# Patient Record
Sex: MALE | Race: WHITE | NOT HISPANIC OR LATINO
[De-identification: names, ages, dates, MRNs, and addresses within clinical notes are randomized per-mention and may not be internally consistent; named-entity substitution may affect disease eponyms.]

---

## 2017-01-03 ENCOUNTER — RECORD ABSTRACTING (OUTPATIENT)
Age: 52
End: 2017-01-03

## 2017-01-11 ENCOUNTER — RESULT REVIEW (OUTPATIENT)
Age: 52
End: 2017-01-11

## 2017-01-11 ENCOUNTER — OTHER (OUTPATIENT)
Age: 52
End: 2017-01-11

## 2017-01-11 ENCOUNTER — APPOINTMENT (OUTPATIENT)
Dept: INTERNAL MEDICINE | Facility: CLINIC | Age: 52
End: 2017-01-11

## 2017-01-11 VITALS
SYSTOLIC BLOOD PRESSURE: 116 MMHG | WEIGHT: 171 LBS | HEART RATE: 93 BPM | RESPIRATION RATE: 17 BRPM | DIASTOLIC BLOOD PRESSURE: 79 MMHG | HEIGHT: 69 IN | BODY MASS INDEX: 25.33 KG/M2 | TEMPERATURE: 96.91 F

## 2017-01-12 LAB
ALBUMIN SERPL-MCNC: 4.8 G/DL
ALBUMIN/GLOB SERPL: 2.29
ALP SERPL-CCNC: 56 IU/L
ALT SERPL-CCNC: 33 IU/L
ANION GAP SERPL CALC-SCNC: 8 MEQ/L
AST SERPL-CCNC: 33 IU/L
BASOPHILS # BLD: 0.01 TH/MM3
BASOPHILS NFR BLD: 0.2 %
BILIRUB SERPL-MCNC: 1 MG/DL
BUN SERPL-MCNC: 11 MG/DL
BUN/CREAT SERPL: 10.8 %
CALCIUM SERPL-MCNC: 9.6 MG/DL
CHLORIDE SERPL-SCNC: 103 MEQ/L
CHOLEST SERPL-MCNC: 262 MG/DL
CO2 SERPL-SCNC: 30 MEQ/L
CREAT SERPL-MCNC: 1.02 MG/DL
EOSINOPHIL # BLD: 0.04 TH/MM3
EOSINOPHIL NFR BLD: 0.9 %
ERYTHROCYTE [DISTWIDTH] IN BLOOD BY AUTOMATED COUNT: 13 %
ESTIMATED AVERGAGE GLUCOSE (NORTH): 100 MG/DL
GFR SERPL CREATININE-BSD FRML MDRD: 77
GLUCOSE SERPL-MCNC: 94 MG/DL
GRANULOCYTES # BLD: 2.08 TH/MM3
GRANULOCYTES NFR BLD: 45.9 %
HBA1C MFR BLD: 5.1 %
HCT VFR BLD AUTO: 45.7 %
HDLC SERPL-MCNC: 103 MG/DL
HDLC SERPL: 2.54
HGB BLD-MCNC: 15.8 G/DL
IMM GRANULOCYTES # BLD: 0.01 TH/MM3
IMM GRANULOCYTES NFR BLD: 0.2 %
LDLC SERPL DIRECT ASSAY-MCNC: 138 MG/DL
LYMPHOCYTES # BLD: 1.86 TH/MM3
LYMPHOCYTES NFR BLD: 41.1 %
MCH RBC QN AUTO: 32.9 PG
MCHC RBC AUTO-ENTMCNC: 34.6 G/DL
MCV RBC AUTO: 95.2 FL
MONOCYTES # BLD: 0.53 TH/MM3
MONOCYTES NFR BLD: 11.7 %
PLATELET # BLD: 164 TH/MM3
PMV BLD AUTO: 11 FL
POTASSIUM SERPL-SCNC: 4.2 MMOL/L
PROT SERPL-MCNC: 6.9 G/DL
RBC # BLD AUTO: 4.8 MIL/MM3
SODIUM SERPL-SCNC: 141 MEQ/L
TRIGL SERPL-MCNC: 141 MG/DL
TSH SERPL DL<=0.005 MIU/L-ACNC: 4.52 UIU/ML
URATE SERPL-MCNC: 4.9 MG/DL
VLDLC SERPL-MCNC: 28 MG/DL
WBC # BLD: 4.53 TH/MM3

## 2017-01-13 LAB — HIV1+2 AB SPEC QL IA.RAPID: NONREACTIVE

## 2017-01-17 LAB
GAMMA INTERFERON BACKGROUND BLD IA-ACNC: 0.02 IU/ML
M TB IFN-G BLD-IMP: NEGATIVE
M TB IFN-G CD4+ T-CELLS BLD-ACNC: 0 IU/ML
MITOGEN IGNF BLD-ACNC: 4.02 IU/ML

## 2017-01-23 ENCOUNTER — APPOINTMENT (OUTPATIENT)
Dept: HEMATOLOGY ONCOLOGY | Facility: CLINIC | Age: 52
End: 2017-01-23

## 2017-01-23 VITALS
BODY MASS INDEX: 25.48 KG/M2 | HEIGHT: 69 IN | DIASTOLIC BLOOD PRESSURE: 85 MMHG | WEIGHT: 172 LBS | TEMPERATURE: 205.52 F | HEART RATE: 94 BPM | SYSTOLIC BLOOD PRESSURE: 125 MMHG

## 2017-01-24 LAB
FERRITIN SERPL-MCNC: 116 NG/ML
IRON SERPL-MCNC: 205 UG/DL
TIBC SERPL-MCNC: 369 UG/DL

## 2017-01-27 ENCOUNTER — APPOINTMENT (OUTPATIENT)
Dept: INFUSION THERAPY | Facility: CLINIC | Age: 52
End: 2017-01-27

## 2017-01-30 LAB
BASOPHILS # BLD: 0.03 TH/MM3
BASOPHILS NFR BLD: 0.5 %
EOSINOPHIL # BLD: 0.06 TH/MM3
EOSINOPHIL NFR BLD: 1 %
ERYTHROCYTE [DISTWIDTH] IN BLOOD BY AUTOMATED COUNT: 12.5 %
GRANULOCYTES # BLD: 2.52 TH/MM3
GRANULOCYTES NFR BLD: 42.7 %
HCT VFR BLD AUTO: 46.6 %
HGB BLD-MCNC: 17 G/DL
IMM GRANULOCYTES # BLD: 0.08 TH/MM3
IMM GRANULOCYTES NFR BLD: 1.4 %
LYMPHOCYTES # BLD: 2.52 TH/MM3
LYMPHOCYTES NFR BLD: 42.7 %
MCH RBC QN AUTO: 32.9 PG
MCHC RBC AUTO-ENTMCNC: 36.5 G/DL
MCV RBC AUTO: 90.1 FL
MONOCYTES # BLD: 0.69 TH/MM3
MONOCYTES NFR BLD: 11.7 %
PLATELET # BLD: 116 TH/MM3
PMV BLD AUTO: 10.7 FL
RBC # BLD AUTO: 5.17 MIL/MM3
WBC # BLD: 5.9 TH/MM3

## 2017-02-24 ENCOUNTER — OUTPATIENT (OUTPATIENT)
Dept: OUTPATIENT SERVICES | Facility: HOSPITAL | Age: 52
LOS: 1 days | Discharge: HOME | End: 2017-02-24

## 2017-02-24 ENCOUNTER — APPOINTMENT (OUTPATIENT)
Dept: INFUSION THERAPY | Facility: CLINIC | Age: 52
End: 2017-02-24

## 2017-02-27 LAB
BASOPHILS # BLD: 0.03 TH/MM3
BASOPHILS NFR BLD: 0.5 %
EOSINOPHIL # BLD: 0.1 TH/MM3
EOSINOPHIL NFR BLD: 1.7 %
ERYTHROCYTE [DISTWIDTH] IN BLOOD BY AUTOMATED COUNT: 12.4 %
FERRITIN SERPL-MCNC: 74 NG/ML
GRANULOCYTES # BLD: 2.65 TH/MM3
GRANULOCYTES NFR BLD: 46 %
HCT VFR BLD AUTO: 43.4 %
HGB BLD-MCNC: 15.3 G/DL
IMM GRANULOCYTES # BLD: 0.07 TH/MM3
IMM GRANULOCYTES NFR BLD: 1.2 %
LYMPHOCYTES # BLD: 2.25 TH/MM3
LYMPHOCYTES NFR BLD: 39.1 %
MCH RBC QN AUTO: 33.2 PG
MCHC RBC AUTO-ENTMCNC: 35.3 G/DL
MCV RBC AUTO: 94.1 FL
MONOCYTES # BLD: 0.66 TH/MM3
MONOCYTES NFR BLD: 11.5 %
PLATELET # BLD: 160 TH/MM3
PMV BLD AUTO: 9.9 FL
RBC # BLD AUTO: 4.61 MIL/MM3
WBC # BLD: 5.76 TH/MM3

## 2017-04-19 ENCOUNTER — APPOINTMENT (OUTPATIENT)
Dept: HEMATOLOGY ONCOLOGY | Facility: CLINIC | Age: 52
End: 2017-04-19

## 2017-04-19 VITALS
HEIGHT: 69 IN | WEIGHT: 170 LBS | HEART RATE: 90 BPM | SYSTOLIC BLOOD PRESSURE: 124 MMHG | DIASTOLIC BLOOD PRESSURE: 85 MMHG | BODY MASS INDEX: 25.18 KG/M2 | TEMPERATURE: 205.52 F

## 2017-04-20 LAB
ALBUMIN SERPL-MCNC: 4.4 G/DL
ALBUMIN/GLOB SERPL: 1.91
ALP SERPL-CCNC: 56 IU/L
ALT SERPL-CCNC: 26 IU/L
ANION GAP SERPL CALC-SCNC: 10 MEQ/L
AST SERPL-CCNC: 29 IU/L
BASOPHILS # BLD: 0.03 TH/MM3
BASOPHILS NFR BLD: 0.6 %
BILIRUB SERPL-MCNC: 1 MG/DL
BUN SERPL-MCNC: 7 MG/DL
BUN/CREAT SERPL: 8 %
CALCIUM SERPL-MCNC: 10 MG/DL
CHLORIDE SERPL-SCNC: 103 MEQ/L
CO2 SERPL-SCNC: 30 MEQ/L
CREAT SERPL-MCNC: 0.87 MG/DL
EOSINOPHIL # BLD: 0.07 TH/MM3
EOSINOPHIL NFR BLD: 1.4 %
ERYTHROCYTE [DISTWIDTH] IN BLOOD BY AUTOMATED COUNT: 12.5 %
GFR SERPL CREATININE-BSD FRML MDRD: 93
GLUCOSE SERPL-MCNC: 66 MG/DL
GRANULOCYTES # BLD: 2.47 TH/MM3
GRANULOCYTES NFR BLD: 47.8 %
HCT VFR BLD AUTO: 43.8 %
HGB BLD-MCNC: 15 G/DL
IMM GRANULOCYTES # BLD: 0.01 TH/MM3
IMM GRANULOCYTES NFR BLD: 0.2 %
LYMPHOCYTES # BLD: 1.92 TH/MM3
LYMPHOCYTES NFR BLD: 37.2 %
MCH RBC QN AUTO: 32.5 PG
MCHC RBC AUTO-ENTMCNC: 34.2 G/DL
MCV RBC AUTO: 94.8 FL
MONOCYTES # BLD: 0.66 TH/MM3
MONOCYTES NFR BLD: 12.8 %
PERCENT SATURATION (NORTH): 28.7 %
PLATELET # BLD: 156 TH/MM3
PMV BLD AUTO: 10.2 FL
POTASSIUM SERPL-SCNC: 4.8 MMOL/L
PROT SERPL-MCNC: 6.7 G/DL
RBC # BLD AUTO: 4.62 MIL/MM3
SODIUM SERPL-SCNC: 143 MEQ/L
TIBC SERPL-MCNC: 425 UG/DL
WBC # BLD: 5.16 TH/MM3

## 2017-04-21 LAB
AFP-TM SERPL-MCNC: 6.2 NG/ML
FERRITIN SERPL-MCNC: 61 NG/ML

## 2017-05-03 ENCOUNTER — APPOINTMENT (OUTPATIENT)
Dept: INTERNAL MEDICINE | Facility: CLINIC | Age: 52
End: 2017-05-03

## 2017-05-03 VITALS
HEART RATE: 91 BPM | BODY MASS INDEX: 25.33 KG/M2 | DIASTOLIC BLOOD PRESSURE: 81 MMHG | WEIGHT: 171 LBS | SYSTOLIC BLOOD PRESSURE: 120 MMHG | HEIGHT: 69 IN

## 2017-05-04 ENCOUNTER — OUTPATIENT (OUTPATIENT)
Dept: OUTPATIENT SERVICES | Facility: HOSPITAL | Age: 52
LOS: 1 days | Discharge: HOME | End: 2017-05-04

## 2017-06-19 ENCOUNTER — OUTPATIENT (OUTPATIENT)
Dept: OUTPATIENT SERVICES | Facility: HOSPITAL | Age: 52
LOS: 1 days | Discharge: HOME | End: 2017-06-19

## 2017-06-28 DIAGNOSIS — Z79.899 OTHER LONG TERM (CURRENT) DRUG THERAPY: ICD-10-CM

## 2017-06-28 DIAGNOSIS — R10.9 UNSPECIFIED ABDOMINAL PAIN: ICD-10-CM

## 2017-07-17 ENCOUNTER — OUTPATIENT (OUTPATIENT)
Dept: OUTPATIENT SERVICES | Facility: HOSPITAL | Age: 52
LOS: 1 days | Discharge: HOME | End: 2017-07-17

## 2017-07-17 DIAGNOSIS — Z00.00 ENCOUNTER FOR GENERAL ADULT MEDICAL EXAMINATION WITHOUT ABNORMAL FINDINGS: ICD-10-CM

## 2017-07-18 ENCOUNTER — RESULT REVIEW (OUTPATIENT)
Age: 52
End: 2017-07-18

## 2017-07-18 ENCOUNTER — APPOINTMENT (OUTPATIENT)
Dept: HEMATOLOGY ONCOLOGY | Facility: CLINIC | Age: 52
End: 2017-07-18

## 2017-07-18 VITALS
HEIGHT: 69 IN | SYSTOLIC BLOOD PRESSURE: 121 MMHG | BODY MASS INDEX: 24.73 KG/M2 | DIASTOLIC BLOOD PRESSURE: 75 MMHG | TEMPERATURE: 209.48 F | WEIGHT: 167 LBS | HEART RATE: 79 BPM

## 2017-07-19 LAB
BASOPHILS # BLD: 0.02 TH/MM3
BASOPHILS NFR BLD: 0.4 %
EOSINOPHIL # BLD: 0.05 TH/MM3
EOSINOPHIL NFR BLD: 1 %
ERYTHROCYTE [DISTWIDTH] IN BLOOD BY AUTOMATED COUNT: 12.8 %
GRANULOCYTES # BLD: 2.18 TH/MM3
GRANULOCYTES NFR BLD: 44.9 %
HCT VFR BLD AUTO: 43.2 %
HGB BLD-MCNC: 14.8 G/DL
IMM GRANULOCYTES # BLD: 0.02 TH/MM3
IMM GRANULOCYTES NFR BLD: 0.4 %
IRON SERPL-MCNC: 245 UG/DL
LYMPHOCYTES # BLD: 2.02 TH/MM3
LYMPHOCYTES NFR BLD: 41.6 %
MCH RBC QN AUTO: 32.4 PG
MCHC RBC AUTO-ENTMCNC: 34.3 G/DL
MCV RBC AUTO: 94.5 FL
MONOCYTES # BLD: 0.57 TH/MM3
MONOCYTES NFR BLD: 11.7 %
PERCENT SATURATION (NORTH): 70.6 %
PLATELET # BLD: 148 TH/MM3
PMV BLD AUTO: 9.6 FL
RBC # BLD AUTO: 4.57 MIL/MM3
TIBC SERPL-MCNC: 347 UG/DL
WBC # BLD: 4.86 TH/MM3

## 2017-07-25 LAB — FERRITIN SERPL-MCNC: 105 NG/ML

## 2017-08-01 ENCOUNTER — EMERGENCY (EMERGENCY)
Facility: HOSPITAL | Age: 52
LOS: 0 days | Discharge: HOME | End: 2017-08-01
Admitting: INTERNAL MEDICINE
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
## 2017-08-01 DIAGNOSIS — Z79.899 OTHER LONG TERM (CURRENT) DRUG THERAPY: ICD-10-CM
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
## 2017-08-01 DIAGNOSIS — Z48.02 ENCOUNTER FOR REMOVAL OF SUTURES: ICD-10-CM

## 2017-08-01 DIAGNOSIS — S01.00XD UNSPECIFIED OPEN WOUND OF SCALP, SUBSEQUENT ENCOUNTER: ICD-10-CM

## 2017-08-02 ENCOUNTER — OUTPATIENT (OUTPATIENT)
Dept: OUTPATIENT SERVICES | Facility: HOSPITAL | Age: 52
LOS: 1 days | Discharge: HOME | End: 2017-08-02

## 2017-08-02 ENCOUNTER — APPOINTMENT (OUTPATIENT)
Dept: INTERNAL MEDICINE | Facility: CLINIC | Age: 52
End: 2017-08-02

## 2017-08-02 VITALS
DIASTOLIC BLOOD PRESSURE: 78 MMHG | WEIGHT: 168 LBS | HEART RATE: 84 BPM | SYSTOLIC BLOOD PRESSURE: 121 MMHG | BODY MASS INDEX: 24.88 KG/M2 | HEIGHT: 69 IN

## 2017-08-02 DIAGNOSIS — E78.00 PURE HYPERCHOLESTEROLEMIA, UNSPECIFIED: ICD-10-CM

## 2017-08-02 DIAGNOSIS — F20.9 SCHIZOPHRENIA, UNSPECIFIED: ICD-10-CM

## 2017-08-02 DIAGNOSIS — G40.909 EPILEPSY, UNSPECIFIED, NOT INTRACTABLE, WITHOUT STATUS EPILEPTICUS: ICD-10-CM

## 2017-08-02 DIAGNOSIS — I10 ESSENTIAL (PRIMARY) HYPERTENSION: ICD-10-CM

## 2017-08-10 ENCOUNTER — OUTPATIENT (OUTPATIENT)
Dept: OUTPATIENT SERVICES | Facility: HOSPITAL | Age: 52
LOS: 1 days | Discharge: HOME | End: 2017-08-10

## 2017-08-10 DIAGNOSIS — K92.0 HEMATEMESIS: ICD-10-CM

## 2017-08-10 DIAGNOSIS — K92.9 DISEASE OF DIGESTIVE SYSTEM, UNSPECIFIED: ICD-10-CM

## 2017-08-11 ENCOUNTER — OUTPATIENT (OUTPATIENT)
Dept: OUTPATIENT SERVICES | Facility: HOSPITAL | Age: 52
LOS: 1 days | Discharge: HOME | End: 2017-08-11

## 2017-08-11 ENCOUNTER — APPOINTMENT (OUTPATIENT)
Dept: INFUSION THERAPY | Facility: CLINIC | Age: 52
End: 2017-08-11

## 2017-08-11 VITALS
SYSTOLIC BLOOD PRESSURE: 120 MMHG | TEMPERATURE: 96.3 F | RESPIRATION RATE: 14 BRPM | HEART RATE: 80 BPM | DIASTOLIC BLOOD PRESSURE: 86 MMHG

## 2017-08-11 DIAGNOSIS — E83.119 HEMOCHROMATOSIS, UNSPECIFIED: ICD-10-CM

## 2017-08-13 LAB
BASOPHILS # BLD: 0.03 TH/MM3
BASOPHILS NFR BLD: 0.6 %
EOSINOPHIL # BLD: 0.08 TH/MM3
EOSINOPHIL NFR BLD: 1.5 %
ERYTHROCYTE [DISTWIDTH] IN BLOOD BY AUTOMATED COUNT: 12.6 %
GRANULOCYTES # BLD: 2.12 TH/MM3
GRANULOCYTES NFR BLD: 40 %
HCT VFR BLD AUTO: 45.7 %
HGB BLD-MCNC: 16.2 G/DL
IMM GRANULOCYTES # BLD: 0.14 TH/MM3
IMM GRANULOCYTES NFR BLD: 2.6 %
LYMPHOCYTES # BLD: 2.28 TH/MM3
LYMPHOCYTES NFR BLD: 43 %
MCH RBC QN AUTO: 32.9 PG
MCHC RBC AUTO-ENTMCNC: 35.4 G/DL
MCV RBC AUTO: 92.9 FL
MONOCYTES # BLD: 0.65 TH/MM3
MONOCYTES NFR BLD: 12.3 %
PLATELET # BLD: 152 TH/MM3
PMV BLD AUTO: 10.4 FL
RBC # BLD AUTO: 4.92 MIL/MM3
WBC # BLD: 5.3 TH/MM3

## 2017-08-14 ENCOUNTER — OUTPATIENT (OUTPATIENT)
Dept: OUTPATIENT SERVICES | Facility: HOSPITAL | Age: 52
LOS: 1 days | Discharge: HOME | End: 2017-08-14

## 2017-08-14 DIAGNOSIS — F20.9 SCHIZOPHRENIA, UNSPECIFIED: ICD-10-CM

## 2017-08-18 ENCOUNTER — OUTPATIENT (OUTPATIENT)
Dept: OUTPATIENT SERVICES | Facility: HOSPITAL | Age: 52
LOS: 1 days | Discharge: HOME | End: 2017-08-18

## 2017-08-18 DIAGNOSIS — Z79.899 OTHER LONG TERM (CURRENT) DRUG THERAPY: ICD-10-CM

## 2017-08-18 DIAGNOSIS — F20.9 SCHIZOPHRENIA, UNSPECIFIED: ICD-10-CM

## 2017-08-18 DIAGNOSIS — E83.119 HEMOCHROMATOSIS, UNSPECIFIED: ICD-10-CM

## 2017-08-25 ENCOUNTER — OUTPATIENT (OUTPATIENT)
Dept: OUTPATIENT SERVICES | Facility: HOSPITAL | Age: 52
LOS: 1 days | Discharge: HOME | End: 2017-08-25

## 2017-08-30 DIAGNOSIS — R15.0 INCOMPLETE DEFECATION: ICD-10-CM

## 2017-08-30 DIAGNOSIS — I10 ESSENTIAL (PRIMARY) HYPERTENSION: ICD-10-CM

## 2017-08-30 DIAGNOSIS — Z53.9 PROCEDURE AND TREATMENT NOT CARRIED OUT, UNSPECIFIED REASON: ICD-10-CM

## 2017-08-30 DIAGNOSIS — Z12.11 ENCOUNTER FOR SCREENING FOR MALIGNANT NEOPLASM OF COLON: ICD-10-CM

## 2017-09-08 ENCOUNTER — APPOINTMENT (OUTPATIENT)
Dept: HEMATOLOGY ONCOLOGY | Facility: CLINIC | Age: 52
End: 2017-09-08

## 2017-09-11 ENCOUNTER — OUTPATIENT (OUTPATIENT)
Dept: OUTPATIENT SERVICES | Facility: HOSPITAL | Age: 52
LOS: 1 days | Discharge: HOME | End: 2017-09-11

## 2017-09-11 DIAGNOSIS — F20.9 SCHIZOPHRENIA, UNSPECIFIED: ICD-10-CM

## 2017-09-11 DIAGNOSIS — Z79.899 OTHER LONG TERM (CURRENT) DRUG THERAPY: ICD-10-CM

## 2017-09-14 LAB — FERRITIN SERPL-MCNC: 110 NG/ML

## 2017-09-26 ENCOUNTER — APPOINTMENT (OUTPATIENT)
Dept: INFUSION THERAPY | Facility: CLINIC | Age: 52
End: 2017-09-26

## 2017-09-26 ENCOUNTER — RX RENEWAL (OUTPATIENT)
Age: 52
End: 2017-09-26

## 2017-09-28 LAB
BASOPHILS # BLD: 0.02 TH/MM3
BASOPHILS NFR BLD: 0.4 %
EOSINOPHIL # BLD: 0.06 TH/MM3
EOSINOPHIL NFR BLD: 1.2 %
ERYTHROCYTE [DISTWIDTH] IN BLOOD BY AUTOMATED COUNT: 12.4 %
GRANULOCYTES # BLD: 2.51 TH/MM3
GRANULOCYTES NFR BLD: 50 %
HCT VFR BLD AUTO: 44.8 %
HGB BLD-MCNC: 15.6 G/DL
IMM GRANULOCYTES # BLD: 0.03 TH/MM3
IMM GRANULOCYTES NFR BLD: 0.6 %
LYMPHOCYTES # BLD: 1.96 TH/MM3
LYMPHOCYTES NFR BLD: 39 %
MCH RBC QN AUTO: 32.8 PG
MCHC RBC AUTO-ENTMCNC: 34.8 G/DL
MCV RBC AUTO: 94.3 FL
MONOCYTES # BLD: 0.44 TH/MM3
MONOCYTES NFR BLD: 8.8 %
PLATELET # BLD: 159 TH/MM3
PMV BLD AUTO: 10.3 FL
RBC # BLD AUTO: 4.75 MIL/MM3
WBC # BLD: 5.02 TH/MM3

## 2017-10-03 ENCOUNTER — APPOINTMENT (OUTPATIENT)
Dept: INFUSION THERAPY | Facility: CLINIC | Age: 52
End: 2017-10-03

## 2017-10-05 LAB
BASOPHILS # BLD: 0.04 TH/MM3
BASOPHILS NFR BLD: 0.7 %
EOSINOPHIL # BLD: 0.04 TH/MM3
EOSINOPHIL NFR BLD: 0.7 %
ERYTHROCYTE [DISTWIDTH] IN BLOOD BY AUTOMATED COUNT: 12.9 %
GRANULOCYTES # BLD: 2.64 TH/MM3
GRANULOCYTES NFR BLD: 47.9 %
HCT VFR BLD AUTO: 44.2 %
HGB BLD-MCNC: 15.1 G/DL
IMM GRANULOCYTES # BLD: 0.11 TH/MM3
IMM GRANULOCYTES NFR BLD: 2 %
LYMPHOCYTES # BLD: 2.17 TH/MM3
LYMPHOCYTES NFR BLD: 39.4 %
MCH RBC QN AUTO: 32.3 PG
MCHC RBC AUTO-ENTMCNC: 34.2 G/DL
MCV RBC AUTO: 94.4 FL
MONOCYTES # BLD: 0.51 TH/MM3
MONOCYTES NFR BLD: 9.3 %
PLATELET # BLD: 153 TH/MM3
PMV BLD AUTO: 10.6 FL
RBC # BLD AUTO: 4.68 MIL/MM3
WBC # BLD: 5.51 TH/MM3

## 2017-10-09 ENCOUNTER — OUTPATIENT (OUTPATIENT)
Dept: OUTPATIENT SERVICES | Facility: HOSPITAL | Age: 52
LOS: 1 days | Discharge: HOME | End: 2017-10-09

## 2017-10-09 DIAGNOSIS — Z79.899 OTHER LONG TERM (CURRENT) DRUG THERAPY: ICD-10-CM

## 2017-10-09 DIAGNOSIS — F20.9 SCHIZOPHRENIA, UNSPECIFIED: ICD-10-CM

## 2017-10-19 ENCOUNTER — APPOINTMENT (OUTPATIENT)
Dept: HEMATOLOGY ONCOLOGY | Facility: CLINIC | Age: 52
End: 2017-10-19

## 2017-10-27 ENCOUNTER — OUTPATIENT (OUTPATIENT)
Dept: OUTPATIENT SERVICES | Facility: HOSPITAL | Age: 52
LOS: 1 days | Discharge: HOME | End: 2017-10-27

## 2017-10-27 DIAGNOSIS — K92.0 HEMATEMESIS: ICD-10-CM

## 2017-10-27 DIAGNOSIS — K92.9 DISEASE OF DIGESTIVE SYSTEM, UNSPECIFIED: ICD-10-CM

## 2017-10-31 ENCOUNTER — OUTPATIENT (OUTPATIENT)
Dept: OUTPATIENT SERVICES | Facility: HOSPITAL | Age: 52
LOS: 1 days | Discharge: HOME | End: 2017-10-31

## 2017-10-31 ENCOUNTER — APPOINTMENT (OUTPATIENT)
Dept: INFUSION THERAPY | Facility: CLINIC | Age: 52
End: 2017-10-31

## 2017-10-31 ENCOUNTER — APPOINTMENT (OUTPATIENT)
Dept: HEMATOLOGY ONCOLOGY | Facility: CLINIC | Age: 52
End: 2017-10-31

## 2017-10-31 VITALS
WEIGHT: 169 LBS | DIASTOLIC BLOOD PRESSURE: 88 MMHG | HEIGHT: 69 IN | TEMPERATURE: 97.2 F | HEART RATE: 93 BPM | RESPIRATION RATE: 14 BRPM | BODY MASS INDEX: 25.03 KG/M2 | SYSTOLIC BLOOD PRESSURE: 123 MMHG

## 2017-10-31 DIAGNOSIS — E83.119 HEMOCHROMATOSIS, UNSPECIFIED: ICD-10-CM

## 2017-11-01 ENCOUNTER — OUTPATIENT (OUTPATIENT)
Dept: OUTPATIENT SERVICES | Facility: HOSPITAL | Age: 52
LOS: 1 days | Discharge: HOME | End: 2017-11-01

## 2017-11-01 DIAGNOSIS — E83.119 HEMOCHROMATOSIS, UNSPECIFIED: ICD-10-CM

## 2017-11-01 DIAGNOSIS — R52 PAIN, UNSPECIFIED: ICD-10-CM

## 2017-11-02 LAB
BASOPHILS # BLD: 0.04 TH/MM3
BASOPHILS NFR BLD: 0.7 %
EOSINOPHIL # BLD: 0.06 TH/MM3
EOSINOPHIL NFR BLD: 1.1 %
ERYTHROCYTE [DISTWIDTH] IN BLOOD BY AUTOMATED COUNT: 12.5 %
FERRITIN SERPL-MCNC: 59 NG/ML
GRANULOCYTES # BLD: 2.42 TH/MM3
GRANULOCYTES NFR BLD: 44.6 %
HCT VFR BLD AUTO: 43.2 %
HGB BLD-MCNC: 14.8 G/DL
IMM GRANULOCYTES # BLD: 0.04 TH/MM3
IMM GRANULOCYTES NFR BLD: 0.7 %
LYMPHOCYTES # BLD: 2.22 TH/MM3
LYMPHOCYTES NFR BLD: 40.9 %
MCH RBC QN AUTO: 32.4 PG
MCHC RBC AUTO-ENTMCNC: 34.3 G/DL
MCV RBC AUTO: 94.5 FL
MONOCYTES # BLD: 0.65 TH/MM3
MONOCYTES NFR BLD: 12 %
PLATELET # BLD: 182 TH/MM3
PMV BLD AUTO: 10.3 FL
RBC # BLD AUTO: 4.57 MIL/MM3
WBC # BLD: 5.43 TH/MM3

## 2017-11-06 ENCOUNTER — OUTPATIENT (OUTPATIENT)
Dept: OUTPATIENT SERVICES | Facility: HOSPITAL | Age: 52
LOS: 1 days | Discharge: HOME | End: 2017-11-06

## 2017-11-08 ENCOUNTER — APPOINTMENT (OUTPATIENT)
Dept: INTERNAL MEDICINE | Facility: CLINIC | Age: 52
End: 2017-11-08

## 2017-11-08 ENCOUNTER — OUTPATIENT (OUTPATIENT)
Dept: OUTPATIENT SERVICES | Facility: HOSPITAL | Age: 52
LOS: 1 days | Discharge: HOME | End: 2017-11-08

## 2017-11-08 VITALS
WEIGHT: 169 LBS | SYSTOLIC BLOOD PRESSURE: 127 MMHG | HEIGHT: 69 IN | DIASTOLIC BLOOD PRESSURE: 83 MMHG | HEART RATE: 92 BPM | BODY MASS INDEX: 25.03 KG/M2

## 2017-11-08 DIAGNOSIS — E83.119 HEMOCHROMATOSIS, UNSPECIFIED: ICD-10-CM

## 2017-11-08 DIAGNOSIS — I10 ESSENTIAL (PRIMARY) HYPERTENSION: ICD-10-CM

## 2017-11-08 DIAGNOSIS — F20.9 SCHIZOPHRENIA, UNSPECIFIED: ICD-10-CM

## 2017-11-14 ENCOUNTER — OUTPATIENT (OUTPATIENT)
Dept: OUTPATIENT SERVICES | Facility: HOSPITAL | Age: 52
LOS: 1 days | Discharge: HOME | End: 2017-11-14

## 2017-11-20 ENCOUNTER — OUTPATIENT (OUTPATIENT)
Dept: OUTPATIENT SERVICES | Facility: HOSPITAL | Age: 52
LOS: 1 days | Discharge: HOME | End: 2017-11-20

## 2017-11-27 DIAGNOSIS — D12.2 BENIGN NEOPLASM OF ASCENDING COLON: ICD-10-CM

## 2017-11-27 DIAGNOSIS — K62.1 RECTAL POLYP: ICD-10-CM

## 2017-11-27 DIAGNOSIS — K64.1 SECOND DEGREE HEMORRHOIDS: ICD-10-CM

## 2017-11-27 DIAGNOSIS — R15.0 INCOMPLETE DEFECATION: ICD-10-CM

## 2017-11-27 DIAGNOSIS — I10 ESSENTIAL (PRIMARY) HYPERTENSION: ICD-10-CM

## 2017-11-27 DIAGNOSIS — D12.6 BENIGN NEOPLASM OF COLON, UNSPECIFIED: ICD-10-CM

## 2017-12-04 ENCOUNTER — OUTPATIENT (OUTPATIENT)
Dept: OUTPATIENT SERVICES | Facility: HOSPITAL | Age: 52
LOS: 1 days | Discharge: HOME | End: 2017-12-04

## 2017-12-04 DIAGNOSIS — F20.9 SCHIZOPHRENIA, UNSPECIFIED: ICD-10-CM

## 2017-12-12 ENCOUNTER — APPOINTMENT (OUTPATIENT)
Dept: HEMATOLOGY ONCOLOGY | Facility: CLINIC | Age: 52
End: 2017-12-12

## 2017-12-14 LAB
BASOPHILS # BLD: 0.01 TH/MM3
BASOPHILS NFR BLD: 0.2 %
EOSINOPHIL # BLD: 0.06 TH/MM3
EOSINOPHIL NFR BLD: 1.4 %
ERYTHROCYTE [DISTWIDTH] IN BLOOD BY AUTOMATED COUNT: 12.8 %
FERRITIN SERPL-MCNC: 116 NG/ML
GRANULOCYTES # BLD: 1.72 TH/MM3
GRANULOCYTES NFR BLD: 39.5 %
HCT VFR BLD AUTO: 40.5 %
HGB BLD-MCNC: 13.5 G/DL
IMM GRANULOCYTES # BLD: 0.01 TH/MM3
IMM GRANULOCYTES NFR BLD: 0.2 %
LYMPHOCYTES # BLD: 1.96 TH/MM3
LYMPHOCYTES NFR BLD: 45.1 %
MCH RBC QN AUTO: 31.8 PG
MCHC RBC AUTO-ENTMCNC: 33.3 G/DL
MCV RBC AUTO: 95.5 FL
MONOCYTES # BLD: 0.59 TH/MM3
MONOCYTES NFR BLD: 13.6 %
PLATELET # BLD: 193 TH/MM3
PMV BLD AUTO: 9.7 FL
RBC # BLD AUTO: 4.24 MIL/MM3
WBC # BLD: 4.35 TH/MM3

## 2017-12-30 ENCOUNTER — OUTPATIENT (OUTPATIENT)
Dept: OUTPATIENT SERVICES | Facility: HOSPITAL | Age: 52
LOS: 1 days | Discharge: HOME | End: 2017-12-30

## 2017-12-30 DIAGNOSIS — Z79.899 OTHER LONG TERM (CURRENT) DRUG THERAPY: ICD-10-CM

## 2018-01-08 ENCOUNTER — APPOINTMENT (OUTPATIENT)
Dept: INFUSION THERAPY | Facility: CLINIC | Age: 53
End: 2018-01-08

## 2018-01-10 LAB
BASOPHILS # BLD: 0.03 TH/MM3
BASOPHILS NFR BLD: 0.6 %
EOSINOPHIL # BLD: 0.05 TH/MM3
EOSINOPHIL NFR BLD: 1 %
ERYTHROCYTE [DISTWIDTH] IN BLOOD BY AUTOMATED COUNT: 13.4 %
FERRITIN SERPL-MCNC: 61 NG/ML
GRANULOCYTES # BLD: 2.23 TH/MM3
GRANULOCYTES NFR BLD: 46.4 %
HCT VFR BLD AUTO: 43.4 %
HGB BLD-MCNC: 14.4 G/DL
IMM GRANULOCYTES # BLD: 0.07 TH/MM3
IMM GRANULOCYTES NFR BLD: 1.5 %
LYMPHOCYTES # BLD: 1.87 TH/MM3
LYMPHOCYTES NFR BLD: 38.9 %
MCH RBC QN AUTO: 31.7 PG
MCHC RBC AUTO-ENTMCNC: 33.2 G/DL
MCV RBC AUTO: 95.6 FL
MONOCYTES # BLD: 0.56 TH/MM3
MONOCYTES NFR BLD: 11.6 %
PLATELET # BLD: 163 TH/MM3
PMV BLD AUTO: 10.4 FL
RBC # BLD AUTO: 4.54 MIL/MM3
WBC # BLD: 4.81 TH/MM3

## 2018-01-17 ENCOUNTER — OUTPATIENT (OUTPATIENT)
Dept: OUTPATIENT SERVICES | Facility: HOSPITAL | Age: 53
LOS: 1 days | Discharge: HOME | End: 2018-01-17

## 2018-01-17 ENCOUNTER — APPOINTMENT (OUTPATIENT)
Dept: INTERNAL MEDICINE | Facility: CLINIC | Age: 53
End: 2018-01-17

## 2018-01-17 ENCOUNTER — RESULT REVIEW (OUTPATIENT)
Age: 53
End: 2018-01-17

## 2018-01-17 VITALS
DIASTOLIC BLOOD PRESSURE: 84 MMHG | WEIGHT: 170 LBS | HEART RATE: 91 BPM | HEIGHT: 69 IN | BODY MASS INDEX: 25.18 KG/M2 | SYSTOLIC BLOOD PRESSURE: 119 MMHG

## 2018-01-17 RX ORDER — ALLOPURINOL 100 MG/1
100 TABLET ORAL
Qty: 30 | Refills: 0 | Status: DISCONTINUED | COMMUNITY
Start: 2017-08-07

## 2018-01-17 RX ORDER — FACIAL-BODY WIPES
5 EACH TOPICAL
Qty: 4 | Refills: 0 | Status: DISCONTINUED | COMMUNITY
Start: 2017-10-03

## 2018-01-17 RX ORDER — POLYETHYLENE GLYCOL-3350 AND ELECTROLYTES 236; 6.74; 5.86; 2.97; 22.74 G/274.31G; G/274.31G; G/274.31G; G/274.31G; G/274.31G
236 POWDER, FOR SOLUTION ORAL
Qty: 4000 | Refills: 0 | Status: DISCONTINUED | COMMUNITY
Start: 2017-10-03

## 2018-01-17 RX ORDER — DIVALPROEX SODIUM 500 MG/1
500 TABLET, DELAYED RELEASE ORAL
Qty: 90 | Refills: 0 | Status: DISCONTINUED | COMMUNITY
Start: 2017-08-04

## 2018-01-18 LAB
ALBUMIN SERPL-MCNC: 4.8 G/DL
ALBUMIN/GLOB SERPL: 2.29
ALP SERPL-CCNC: 58 IU/L
ALT SERPL-CCNC: 35 IU/L
ANION GAP SERPL CALC-SCNC: 14 MEQ/L
APPEARANCE UR: CLEAR
AST SERPL-CCNC: 43 IU/L
BASOPHILS # BLD: 0.02 TH/MM3
BASOPHILS NFR BLD: 0.4 %
BILIRUB SERPL-MCNC: 0.9 MG/DL
BILIRUB UR QL STRIP: NEGATIVE
BUN SERPL-MCNC: 12 MG/DL
BUN/CREAT SERPL: 11.9 %
CALCIUM SERPL-MCNC: 10 MG/DL
CHLORIDE SERPL-SCNC: 97 MEQ/L
CHOLEST SERPL-MCNC: 260 MG/DL
CO2 SERPL-SCNC: 28 MEQ/L
COLOR UR: YELLOW
CREAT SERPL-MCNC: 1.01 MG/DL
DIFFERENTIAL METHOD BLD: NORMAL
EOSINOPHIL # BLD: 0.08 TH/MM3
EOSINOPHIL NFR BLD: 1.5 %
ERYTHROCYTE [DISTWIDTH] IN BLOOD BY AUTOMATED COUNT: 13.6 %
ESTIMATED AVERGAGE GLUCOSE (NORTH): 103 MG/DL
GFR SERPL CREATININE-BSD FRML MDRD: 78
GLUCOSE SERPL-MCNC: 76 MG/DL
GLUCOSE UR STRIP-MCNC: NEGATIVE MG/DL
GRANULOCYTES # BLD: 2.08 TH/MM3
GRANULOCYTES NFR BLD: 38.4 %
HBA1C MFR BLD: 5.2 %
HCT VFR BLD AUTO: 46.3 %
HDLC SERPL-MCNC: 132 MG/DL
HDLC SERPL: 1.97
HGB BLD-MCNC: 15.1 G/DL
HGB UR QL STRIP: NEGATIVE
IMM GRANULOCYTES # BLD: 0.01 TH/MM3
IMM GRANULOCYTES NFR BLD: 0.2 %
KETONES UR STRIP-MCNC: ABNORMAL MG/DL
LDLC SERPL DIRECT ASSAY-MCNC: 117 MG/DL
LYMPHOCYTES # BLD: 2.48 TH/MM3
LYMPHOCYTES NFR BLD: 45.8 %
MCH RBC QN AUTO: 31.9 PG
MCHC RBC AUTO-ENTMCNC: 32.6 G/DL
MCV RBC AUTO: 97.7 FL
MONOCYTES # BLD: 0.74 TH/MM3
MONOCYTES NFR BLD: 13.7 %
NITRITE UR QL STRIP: NEGATIVE
PH UR STRIP: 6
PLATELET # BLD: 200 TH/MM3
PMV BLD AUTO: 11.3 FL
POTASSIUM SERPL-SCNC: 4.3 MMOL/L
PROT SERPL-MCNC: 6.9 G/DL
PROT UR STRIP-MCNC: NEGATIVE MG/DL
RBC # BLD AUTO: 4.74 MIL/MM3
SODIUM SERPL-SCNC: 139 MEQ/L
SP GR UR STRIP: 1.02
TRIGL SERPL-MCNC: 145 MG/DL
TSH SERPL DL<=0.005 MIU/L-ACNC: 3.26 UIU/ML
UROBILINOGEN UR STRIP-MCNC: 1 MG/DL
VLDLC SERPL-MCNC: 29 MG/DL
WBC # BLD: 5.41 TH/MM3
WBC URNS QL MICRO: NEGATIVE

## 2018-01-19 DIAGNOSIS — E78.00 PURE HYPERCHOLESTEROLEMIA, UNSPECIFIED: ICD-10-CM

## 2018-01-19 DIAGNOSIS — E83.119 HEMOCHROMATOSIS, UNSPECIFIED: ICD-10-CM

## 2018-01-19 DIAGNOSIS — F20.9 SCHIZOPHRENIA, UNSPECIFIED: ICD-10-CM

## 2018-01-19 DIAGNOSIS — I10 ESSENTIAL (PRIMARY) HYPERTENSION: ICD-10-CM

## 2018-01-19 LAB
GAMMA INTERFERON BACKGROUND BLD IA-ACNC: 0.01 IU/ML
HIV1+2 AB SPEC QL IA.RAPID: NONREACTIVE
M TB IFN-G BLD-IMP: NEGATIVE
M TB IFN-G CD4+ T-CELLS BLD-ACNC: 0 IU/ML
MITOGEN IGNF BLD-ACNC: 2.1 IU/ML

## 2018-01-22 ENCOUNTER — OUTPATIENT (OUTPATIENT)
Dept: OUTPATIENT SERVICES | Facility: HOSPITAL | Age: 53
LOS: 1 days | Discharge: HOME | End: 2018-01-22

## 2018-01-22 DIAGNOSIS — R10.30 LOWER ABDOMINAL PAIN, UNSPECIFIED: ICD-10-CM

## 2018-01-23 ENCOUNTER — OUTPATIENT (OUTPATIENT)
Dept: OUTPATIENT SERVICES | Facility: HOSPITAL | Age: 53
LOS: 1 days | Discharge: HOME | End: 2018-01-23

## 2018-01-23 ENCOUNTER — APPOINTMENT (OUTPATIENT)
Dept: HEMATOLOGY ONCOLOGY | Facility: CLINIC | Age: 53
End: 2018-01-23

## 2018-01-23 VITALS
TEMPERATURE: 97.8 F | HEART RATE: 89 BPM | DIASTOLIC BLOOD PRESSURE: 75 MMHG | RESPIRATION RATE: 14 BRPM | HEIGHT: 69 IN | BODY MASS INDEX: 24.44 KG/M2 | WEIGHT: 165 LBS | SYSTOLIC BLOOD PRESSURE: 108 MMHG

## 2018-01-23 DIAGNOSIS — E83.119 HEMOCHROMATOSIS, UNSPECIFIED: ICD-10-CM

## 2018-01-23 NOTE — RESULTS/DATA
[FreeTextEntry1] : Patient Name: NGOC AMIN : 1965\par Patient ID: [id number]\par Account: [id number]\par Patient Location: [facility]\par \par Accession: [id number]\par Procedure: CT ABDOMEN PELVIS W IV AND PO CONTRAST [id number]\par Date of Exam: 2018 06:38 PM\par Attending Physician: SHABANA VINCENT\par Requesting Physician: SHABANA VINCENT MD\par \par \par CLINICAL STATEMENT: Abnormal gallbladder. Renal cysts.\par   \par TECHNIQUE: Contiguous axial CT images were obtained from the lower chest to the\par pubic symphysis following administration of 100cc Optiray 320 intravenous\par contrast.  Oral contrast was administered.  Reformatted images in the coronal\par and sagittal planes were acquired.\par   \par Comparison made with abdominal ultrasound 2017, MRI abdomen\par 2015, CT chest 2015\par   \par FINDINGS:\par   \par LOWER CHEST: Right lung base linear atelectasis.\par   \par HEPATOBILIARY: Normal gallbladder. Liver unremarkable. Patent portal vein. No\par biliary dilation.\par   \par SPLEEN: Splenule with nonspecific fluid surrounding (series 4, image 128).\par   \par PANCREAS: Unremarkable.\par   \par ADRENAL GLANDS: Unremarkable.\par   \par KIDNEYS: Bilateral renal cysts measuring up to 3.5 cm on left. No Subcentimeter\par bilateral renal hypodensities, too small to fully characterize. No\par hydronephrosis.\par   \par ABDOMINOPELVIC NODES: Unremarkable.\par   \par PELVIC ORGANS: Unremarkable prostate gland. Nondistended urinary bladder.\par   \par PERITONEUM/MESENTERY/BOWEL: Moderate diffuse colonic stool burden without\par evidence of mechanical bowel obstruction; correlate for constipation.\par   \par BONES/SOFT TISSUES: Unremarkable.\par   \par   \par IMPRESSION:\par 1. Normal CT evaluation of gallbladder.\par 2. Moderate diffuse colonic stool burden without evidence of mechanical bowel\par obstruction; correlate for constipation.\par \par \par Original final report dictated and signed by Dr. Mike De La Garza on 2018\par 09:23 AM\par

## 2018-01-23 NOTE — PHYSICAL EXAM
[Fully active, able to carry on all pre-disease performance without restriction] : Status 0 - Fully active, able to carry on all pre-disease performance without restriction [Normal] : affect appropriate

## 2018-01-23 NOTE — HISTORY OF PRESENT ILLNESS
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  He also had a liver biopsy, consistent with iron overload.  He has been getting intermittent phlebotomies.  He has not had phlebotomy now in several months.  His most recent ferritin was from 08/16/2016; it demonstrated a ferritin of 58.  He now presents for followup evaluation.  He states he is doing quite well.  He has no complaints.\par  [de-identified] : 1/23/17\par He presents for follow up. His ferritin in Oct was 101 and he underwent a phlebotomy of 1 unit. HIs  AFP was 7 and ultrasound of the liver was normal in November. He comes in for follow up. Hs no complaints.\par \par 4/19/17\par He presents for follow up. Last ferritin  was under 100.  He has no complaints,\par \par 7/18/17\par No events. Denies weight loss, fevers. No new meds. Last AFP an ferritin were WNL\par \par 10/31/17\par He is doing well. He had a phlebotomyin September and October. No complaints\par \par 1/23/18\par He is here for follow up for hemochromatisis. He has no complaints. He had  CT abd/Pelvis this month that was normal. His Ferretin from this month is in the 60s.

## 2018-01-23 NOTE — ASSESSMENT
[FreeTextEntry1] :  This is a 52yearold male with history of compound heterozygous mutation, HFE gene for C282Y and H63D mutation, with liver biopsy consistent with iron overload.  The patient has been getting phlebotomies as needed.\par \par PLAN:  Will  check  ferritin and CBC in 2 motns .  If the ferritin is not a goal, ferritin of 50 to 100; we will arrange more  phlebotomy. CT abd/pelvis was normal. Will check AFP in 6 months \par \par The patient also knows not to take iron supplements as well as not to drink any alcoholic beverages.\par \par \par RTC in 3-4 months \par \par

## 2018-01-29 ENCOUNTER — OUTPATIENT (OUTPATIENT)
Dept: OUTPATIENT SERVICES | Facility: HOSPITAL | Age: 53
LOS: 1 days | Discharge: HOME | End: 2018-01-29

## 2018-01-29 DIAGNOSIS — Z79.899 OTHER LONG TERM (CURRENT) DRUG THERAPY: ICD-10-CM

## 2018-01-31 ENCOUNTER — APPOINTMENT (OUTPATIENT)
Dept: INTERNAL MEDICINE | Facility: CLINIC | Age: 53
End: 2018-01-31

## 2018-01-31 ENCOUNTER — OUTPATIENT (OUTPATIENT)
Dept: OUTPATIENT SERVICES | Facility: HOSPITAL | Age: 53
LOS: 1 days | Discharge: HOME | End: 2018-01-31

## 2018-01-31 VITALS
SYSTOLIC BLOOD PRESSURE: 124 MMHG | HEART RATE: 89 BPM | HEIGHT: 69 IN | DIASTOLIC BLOOD PRESSURE: 87 MMHG | WEIGHT: 166 LBS | BODY MASS INDEX: 24.59 KG/M2

## 2018-02-01 DIAGNOSIS — E78.5 HYPERLIPIDEMIA, UNSPECIFIED: ICD-10-CM

## 2018-02-01 DIAGNOSIS — I10 ESSENTIAL (PRIMARY) HYPERTENSION: ICD-10-CM

## 2018-02-01 DIAGNOSIS — F20.9 SCHIZOPHRENIA, UNSPECIFIED: ICD-10-CM

## 2018-02-26 ENCOUNTER — OUTPATIENT (OUTPATIENT)
Dept: OUTPATIENT SERVICES | Facility: HOSPITAL | Age: 53
LOS: 1 days | Discharge: HOME | End: 2018-02-26

## 2018-02-26 DIAGNOSIS — I10 ESSENTIAL (PRIMARY) HYPERTENSION: ICD-10-CM

## 2018-03-13 ENCOUNTER — APPOINTMENT (OUTPATIENT)
Dept: HEMATOLOGY ONCOLOGY | Facility: CLINIC | Age: 53
End: 2018-03-13

## 2018-03-13 ENCOUNTER — LABORATORY RESULT (OUTPATIENT)
Age: 53
End: 2018-03-13

## 2018-03-15 LAB
FERRITIN SERPL-MCNC: 81 NG/ML
HCT VFR BLD CALC: 42.2 %
HGB BLD-MCNC: 14.3 G/DL
MCHC RBC-ENTMCNC: 31.9 PG
MCHC RBC-ENTMCNC: 33.9 G/DL
MCV RBC AUTO: 94.2 FL
PLATELET # BLD AUTO: 152 K/UL
PMV BLD: 10 FL
RBC # BLD: 4.48 M/UL
RBC # FLD: 12.8 %
WBC # FLD AUTO: 4.25 K/UL

## 2018-04-23 ENCOUNTER — OUTPATIENT (OUTPATIENT)
Dept: OUTPATIENT SERVICES | Facility: HOSPITAL | Age: 53
LOS: 1 days | Discharge: HOME | End: 2018-04-23

## 2018-04-23 DIAGNOSIS — M10.9 GOUT, UNSPECIFIED: ICD-10-CM

## 2018-04-23 DIAGNOSIS — F20.9 SCHIZOPHRENIA, UNSPECIFIED: ICD-10-CM

## 2018-04-23 DIAGNOSIS — D50.8 OTHER IRON DEFICIENCY ANEMIAS: ICD-10-CM

## 2018-04-24 ENCOUNTER — APPOINTMENT (OUTPATIENT)
Dept: HEMATOLOGY ONCOLOGY | Facility: CLINIC | Age: 53
End: 2018-04-24

## 2018-04-24 ENCOUNTER — LABORATORY RESULT (OUTPATIENT)
Age: 53
End: 2018-04-24

## 2018-04-24 VITALS
BODY MASS INDEX: 33.18 KG/M2 | WEIGHT: 169 LBS | DIASTOLIC BLOOD PRESSURE: 80 MMHG | TEMPERATURE: 96.9 F | HEART RATE: 89 BPM | HEIGHT: 60 IN | SYSTOLIC BLOOD PRESSURE: 120 MMHG | RESPIRATION RATE: 16 BRPM

## 2018-04-24 LAB
HCT VFR BLD CALC: 43.6 %
HGB BLD-MCNC: 14.9 G/DL
MCHC RBC-ENTMCNC: 32.2 PG
MCHC RBC-ENTMCNC: 34.2 G/DL
MCV RBC AUTO: 94.2 FL
PLATELET # BLD AUTO: 158 K/UL
PMV BLD: 10.2 FL
RBC # BLD: 4.63 M/UL
RBC # FLD: 12.8 %
WBC # FLD AUTO: 5.38 K/UL

## 2018-04-25 LAB
ALBUMIN SERPL ELPH-MCNC: 4.6 G/DL
ALP BLD-CCNC: 59 U/L
ALT SERPL-CCNC: 24 U/L
ANION GAP SERPL CALC-SCNC: 11 MMOL/L
AST SERPL-CCNC: 24 U/L
BILIRUB SERPL-MCNC: 0.5 MG/DL
BUN SERPL-MCNC: 11 MG/DL
CALCIUM SERPL-MCNC: 9.5 MG/DL
CHLORIDE SERPL-SCNC: 102 MMOL/L
CO2 SERPL-SCNC: 29 MMOL/L
CREAT SERPL-MCNC: 0.9 MG/DL
FERRITIN SERPL-MCNC: 88 NG/ML
GLUCOSE SERPL-MCNC: 82 MG/DL
POTASSIUM SERPL-SCNC: 4.3 MMOL/L
PROT SERPL-MCNC: 6.5 G/DL
SODIUM SERPL-SCNC: 142 MMOL/L

## 2018-05-01 LAB
AFPL3 RESULTS RECEIVED: NORMAL
ALPHA-1-FETOPROTEIN-L3: NORMAL %
ALPHA-1-FETOPROTEIN: 2.5 NG/ML

## 2018-05-14 ENCOUNTER — OUTPATIENT (OUTPATIENT)
Dept: OUTPATIENT SERVICES | Facility: HOSPITAL | Age: 53
LOS: 1 days | Discharge: HOME | End: 2018-05-14

## 2018-05-14 DIAGNOSIS — Z79.899 OTHER LONG TERM (CURRENT) DRUG THERAPY: ICD-10-CM

## 2018-05-14 DIAGNOSIS — R53.81 OTHER MALAISE: ICD-10-CM

## 2018-05-21 ENCOUNTER — OUTPATIENT (OUTPATIENT)
Dept: OUTPATIENT SERVICES | Facility: HOSPITAL | Age: 53
LOS: 1 days | Discharge: HOME | End: 2018-05-21

## 2018-05-21 DIAGNOSIS — Z79.899 OTHER LONG TERM (CURRENT) DRUG THERAPY: ICD-10-CM

## 2018-05-21 DIAGNOSIS — F20.9 SCHIZOPHRENIA, UNSPECIFIED: ICD-10-CM

## 2018-06-04 ENCOUNTER — OUTPATIENT (OUTPATIENT)
Dept: OUTPATIENT SERVICES | Facility: HOSPITAL | Age: 53
LOS: 1 days | Discharge: HOME | End: 2018-06-04

## 2018-06-04 ENCOUNTER — APPOINTMENT (OUTPATIENT)
Dept: INTERNAL MEDICINE | Facility: CLINIC | Age: 53
End: 2018-06-04

## 2018-06-04 VITALS
BODY MASS INDEX: 24.14 KG/M2 | SYSTOLIC BLOOD PRESSURE: 135 MMHG | HEART RATE: 80 BPM | TEMPERATURE: 97.5 F | HEIGHT: 69 IN | WEIGHT: 163 LBS | DIASTOLIC BLOOD PRESSURE: 90 MMHG

## 2018-06-04 NOTE — PHYSICAL EXAM
[Clear to Auscultation] : lungs were clear to auscultation bilaterally [Normal Rate] : normal rate  [Regular Rhythm] : with a regular rhythm [Normal S1, S2] : normal S1 and S2 [No Edema] : there was no peripheral edema [Supple] : supple [No CVA Tenderness] : no CVA  tenderness [No Joint Swelling] : no joint swelling [de-identified] : Flat Affect

## 2018-06-04 NOTE — HISTORY OF PRESENT ILLNESS
[FreeTextEntry1] : Refill Rx [de-identified] : 51 y/o male patient PMHx of schizophrenia,HTN,hypercholesterolemia, Hemochromatosis presents to the office for a follow-up visit. Patient follows with [facility] and Dr. Cr. Patient doesn't have any complaints \par

## 2018-06-04 NOTE — ASSESSMENT
[FreeTextEntry1] : 53 y/o male patient PMHx of schizophrenia and Hemochromatosis presents to the office for a follow-up visit and RX refills\par \par # HTN\par - c/w Ramipril, ASA\par \par # Gout\par - c/w Allopurinol \par \par # Hemochromatosis\par - Patient is following up with hematology\par \par # Schizophrenia\par - Patient is following up with psychiatry

## 2018-06-05 ENCOUNTER — APPOINTMENT (OUTPATIENT)
Dept: HEMATOLOGY ONCOLOGY | Facility: CLINIC | Age: 53
End: 2018-06-05

## 2018-06-05 ENCOUNTER — LABORATORY RESULT (OUTPATIENT)
Age: 53
End: 2018-06-05

## 2018-06-06 ENCOUNTER — OTHER (OUTPATIENT)
Age: 53
End: 2018-06-06

## 2018-06-06 DIAGNOSIS — I10 ESSENTIAL (PRIMARY) HYPERTENSION: ICD-10-CM

## 2018-06-06 DIAGNOSIS — E78.5 HYPERLIPIDEMIA, UNSPECIFIED: ICD-10-CM

## 2018-06-06 DIAGNOSIS — E83.119 HEMOCHROMATOSIS, UNSPECIFIED: ICD-10-CM

## 2018-06-06 LAB
ALBUMIN SERPL ELPH-MCNC: 4.2 G/DL
ALP BLD-CCNC: 55 U/L
ALT SERPL-CCNC: 23 U/L
ANION GAP SERPL CALC-SCNC: 15 MMOL/L
AST SERPL-CCNC: 21 U/L
BILIRUB SERPL-MCNC: 0.6 MG/DL
BUN SERPL-MCNC: 11 MG/DL
CALCIUM SERPL-MCNC: 9.3 MG/DL
CHLORIDE SERPL-SCNC: 103 MMOL/L
CO2 SERPL-SCNC: 26 MMOL/L
CREAT SERPL-MCNC: 0.8 MG/DL
GLUCOSE SERPL-MCNC: 94 MG/DL
HCT VFR BLD CALC: 43.3 %
HGB BLD-MCNC: 15.2 G/DL
MCHC RBC-ENTMCNC: 33.3 PG
MCHC RBC-ENTMCNC: 35.1 G/DL
MCV RBC AUTO: 94.7 FL
PLATELET # BLD AUTO: 139 K/UL
PMV BLD: 10.1 FL
POTASSIUM SERPL-SCNC: 4 MMOL/L
PROT SERPL-MCNC: 6.2 G/DL
RBC # BLD: 4.57 M/UL
RBC # FLD: 12.5 %
SODIUM SERPL-SCNC: 144 MMOL/L
WBC # FLD AUTO: 4.74 K/UL

## 2018-06-07 LAB — FERRITIN SERPL-MCNC: 109 NG/ML

## 2018-06-13 ENCOUNTER — OUTPATIENT (OUTPATIENT)
Dept: OUTPATIENT SERVICES | Facility: HOSPITAL | Age: 53
LOS: 1 days | Discharge: HOME | End: 2018-06-13

## 2018-06-13 DIAGNOSIS — E83.119 HEMOCHROMATOSIS, UNSPECIFIED: ICD-10-CM

## 2018-06-18 ENCOUNTER — OUTPATIENT (OUTPATIENT)
Dept: OUTPATIENT SERVICES | Facility: HOSPITAL | Age: 53
LOS: 1 days | Discharge: HOME | End: 2018-06-18

## 2018-06-18 DIAGNOSIS — F20.9 SCHIZOPHRENIA, UNSPECIFIED: ICD-10-CM

## 2018-06-18 DIAGNOSIS — Z79.899 OTHER LONG TERM (CURRENT) DRUG THERAPY: ICD-10-CM

## 2018-07-16 ENCOUNTER — OUTPATIENT (OUTPATIENT)
Dept: OUTPATIENT SERVICES | Facility: HOSPITAL | Age: 53
LOS: 1 days | Discharge: HOME | End: 2018-07-16

## 2018-07-16 DIAGNOSIS — Z79.899 OTHER LONG TERM (CURRENT) DRUG THERAPY: ICD-10-CM

## 2018-08-10 ENCOUNTER — OUTPATIENT (OUTPATIENT)
Dept: OUTPATIENT SERVICES | Facility: HOSPITAL | Age: 53
LOS: 1 days | Discharge: HOME | End: 2018-08-10

## 2018-08-10 ENCOUNTER — APPOINTMENT (OUTPATIENT)
Dept: HEMATOLOGY ONCOLOGY | Facility: CLINIC | Age: 53
End: 2018-08-10

## 2018-08-10 ENCOUNTER — LABORATORY RESULT (OUTPATIENT)
Age: 53
End: 2018-08-10

## 2018-08-10 DIAGNOSIS — E83.119 HEMOCHROMATOSIS, UNSPECIFIED: ICD-10-CM

## 2018-08-14 ENCOUNTER — OUTPATIENT (OUTPATIENT)
Dept: OUTPATIENT SERVICES | Facility: HOSPITAL | Age: 53
LOS: 1 days | Discharge: HOME | End: 2018-08-14

## 2018-08-14 ENCOUNTER — APPOINTMENT (OUTPATIENT)
Dept: HEMATOLOGY ONCOLOGY | Facility: CLINIC | Age: 53
End: 2018-08-14

## 2018-08-14 VITALS
TEMPERATURE: 98.6 F | WEIGHT: 160 LBS | HEIGHT: 69 IN | BODY MASS INDEX: 23.7 KG/M2 | RESPIRATION RATE: 16 BRPM | DIASTOLIC BLOOD PRESSURE: 85 MMHG | SYSTOLIC BLOOD PRESSURE: 121 MMHG | HEART RATE: 83 BPM

## 2018-08-16 LAB
FERRITIN SERPL-MCNC: 98 NG/ML
HCT VFR BLD CALC: 43.7 %
HGB BLD-MCNC: 15 G/DL
IRON SATN MFR SERPL: 63 %
IRON SERPL-MCNC: 186 UG/DL
MCHC RBC-ENTMCNC: 32.8 PG
MCHC RBC-ENTMCNC: 34.3 G/DL
MCV RBC AUTO: 95.4 FL
PLATELET # BLD AUTO: 138 K/UL
PMV BLD: 10.3 FL
RBC # BLD: 4.58 M/UL
RBC # FLD: 11.9 %
TIBC SERPL-MCNC: 296 UG/DL
UIBC SERPL-MCNC: 110 UG/DL
WBC # FLD AUTO: 4.6 K/UL

## 2018-08-17 ENCOUNTER — OUTPATIENT (OUTPATIENT)
Dept: OUTPATIENT SERVICES | Facility: HOSPITAL | Age: 53
LOS: 1 days | Discharge: HOME | End: 2018-08-17

## 2018-08-17 DIAGNOSIS — R93.2 ABNORMAL FINDINGS ON DIAGNOSTIC IMAGING OF LIVER AND BILIARY TRACT: ICD-10-CM

## 2018-08-17 DIAGNOSIS — R10.9 UNSPECIFIED ABDOMINAL PAIN: ICD-10-CM

## 2018-08-17 DIAGNOSIS — D12.6 BENIGN NEOPLASM OF COLON, UNSPECIFIED: ICD-10-CM

## 2018-08-17 NOTE — ASSESSMENT
[FreeTextEntry1] :  This is a 52yearold male with history of compound heterozygous mutation, HFE gene for C282Y and H63D mutation, with liver biopsy consistent with iron overload.  The patient has been getting phlebotomies as needed.\par \par PLAN:  Will  check  ferritin and CBC today in 3 months.  If the ferritin is not a goal, ferritin of 50 to 100; we will arrange more  phlebotomy. CT abd/pelvis was normal in 1/2018 with normal AFP will check US abdomen in Jan 2018.\par \par The patient also knows not to take iron supplements as well as not to drink any alcoholic beverages.\par \par \par RTC in 4 months with  lab work CBC and ferritin in 2 months.\par \par

## 2018-08-17 NOTE — HISTORY OF PRESENT ILLNESS
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  He also had a liver biopsy, consistent with iron overload.  He has been getting intermittent phlebotomies.  He has not had phlebotomy now in several months.  His most recent ferritin was from 08/16/2016; it demonstrated a ferritin of 58.  He now presents for followup evaluation.  He states he is doing quite well.  He has no complaints.\par  [de-identified] : 1/23/17\par He presents for follow up. His ferritin in Oct was 101 and he underwent a phlebotomy of 1 unit. HIs  AFP was 7 and ultrasound of the liver was normal in November. He comes in for follow up. Hs no complaints.\par \par 4/19/17\par He presents for follow up. Last ferritin  was under 100.  He has no complaints,\par \par 7/18/17\par No events. Denies weight loss, fevers. No new meds. Last AFP an ferritin were WNL\par \par 10/31/17\par He is doing well. He had a phlebotomyin September and October. No complaints\par \par 1/23/18\par He is here for follow up for hemochromatisis. He has no complaints. He had  CT abd/Pelvis this month that was normal. His Ferretin from this month is in the 60s.\par \par 04/24/2018\par Patient is here for scheduled follow up visit. He denies any complaints. His last ferritin(03/2018) was 81.\par \par 8/14/18\par He is here for follow up.  HIs ferretin from 8/10 was 98. He has no complaints. Last AFP 2.5 in April\par

## 2018-08-27 DIAGNOSIS — E83.110 HEREDITARY HEMOCHROMATOSIS: ICD-10-CM

## 2018-09-10 ENCOUNTER — OUTPATIENT (OUTPATIENT)
Dept: OUTPATIENT SERVICES | Facility: HOSPITAL | Age: 53
LOS: 1 days | Discharge: HOME | End: 2018-09-10

## 2018-09-10 DIAGNOSIS — Z79.899 OTHER LONG TERM (CURRENT) DRUG THERAPY: ICD-10-CM

## 2018-09-10 DIAGNOSIS — F20.9 SCHIZOPHRENIA, UNSPECIFIED: ICD-10-CM

## 2018-10-08 ENCOUNTER — OUTPATIENT (OUTPATIENT)
Dept: OUTPATIENT SERVICES | Facility: HOSPITAL | Age: 53
LOS: 1 days | Discharge: HOME | End: 2018-10-08

## 2018-10-08 DIAGNOSIS — Z79.899 OTHER LONG TERM (CURRENT) DRUG THERAPY: ICD-10-CM

## 2018-10-08 DIAGNOSIS — F20.9 SCHIZOPHRENIA, UNSPECIFIED: ICD-10-CM

## 2018-11-05 ENCOUNTER — OUTPATIENT (OUTPATIENT)
Dept: OUTPATIENT SERVICES | Facility: HOSPITAL | Age: 53
LOS: 1 days | Discharge: HOME | End: 2018-11-05

## 2018-11-05 DIAGNOSIS — F20.9 SCHIZOPHRENIA, UNSPECIFIED: ICD-10-CM

## 2018-11-05 DIAGNOSIS — Z79.899 OTHER LONG TERM (CURRENT) DRUG THERAPY: ICD-10-CM

## 2018-11-13 ENCOUNTER — OUTPATIENT (OUTPATIENT)
Dept: OUTPATIENT SERVICES | Facility: HOSPITAL | Age: 53
LOS: 1 days | Discharge: HOME | End: 2018-11-13

## 2018-11-13 DIAGNOSIS — M10.9 GOUT, UNSPECIFIED: ICD-10-CM

## 2018-11-13 DIAGNOSIS — D50.8 OTHER IRON DEFICIENCY ANEMIAS: ICD-10-CM

## 2018-11-13 DIAGNOSIS — R79.89 OTHER SPECIFIED ABNORMAL FINDINGS OF BLOOD CHEMISTRY: ICD-10-CM

## 2018-11-15 ENCOUNTER — LABORATORY RESULT (OUTPATIENT)
Age: 53
End: 2018-11-15

## 2018-11-15 ENCOUNTER — APPOINTMENT (OUTPATIENT)
Dept: HEMATOLOGY ONCOLOGY | Facility: CLINIC | Age: 53
End: 2018-11-15

## 2018-11-16 LAB
HCT VFR BLD CALC: 43.1 %
HGB BLD-MCNC: 14.7 G/DL
MCHC RBC-ENTMCNC: 32.7 PG
MCHC RBC-ENTMCNC: 34.1 G/DL
MCV RBC AUTO: 95.8 FL
PLATELET # BLD AUTO: 153 K/UL
PMV BLD: 9.8 FL
RBC # BLD: 4.5 M/UL
RBC # FLD: 12.3 %
WBC # FLD AUTO: 5.03 K/UL

## 2018-11-20 LAB — FERRITIN SERPL-MCNC: 160 NG/ML

## 2018-11-27 ENCOUNTER — OUTPATIENT (OUTPATIENT)
Dept: OUTPATIENT SERVICES | Facility: HOSPITAL | Age: 53
LOS: 1 days | Discharge: HOME | End: 2018-11-27

## 2018-11-27 DIAGNOSIS — E83.119 HEMOCHROMATOSIS, UNSPECIFIED: ICD-10-CM

## 2018-12-03 ENCOUNTER — OUTPATIENT (OUTPATIENT)
Dept: OUTPATIENT SERVICES | Facility: HOSPITAL | Age: 53
LOS: 1 days | Discharge: HOME | End: 2018-12-03

## 2018-12-03 ENCOUNTER — APPOINTMENT (OUTPATIENT)
Dept: INTERNAL MEDICINE | Facility: CLINIC | Age: 53
End: 2018-12-03

## 2018-12-03 VITALS
TEMPERATURE: 98.3 F | WEIGHT: 162 LBS | DIASTOLIC BLOOD PRESSURE: 81 MMHG | HEART RATE: 80 BPM | SYSTOLIC BLOOD PRESSURE: 117 MMHG | BODY MASS INDEX: 23.99 KG/M2 | HEIGHT: 69 IN

## 2018-12-03 DIAGNOSIS — Z79.899 OTHER LONG TERM (CURRENT) DRUG THERAPY: ICD-10-CM

## 2018-12-03 DIAGNOSIS — Z78.9 OTHER SPECIFIED HEALTH STATUS: ICD-10-CM

## 2018-12-03 DIAGNOSIS — E55.9 VITAMIN D DEFICIENCY, UNSPECIFIED: ICD-10-CM

## 2018-12-03 DIAGNOSIS — Z87.09 PERSONAL HISTORY OF OTHER DISEASES OF THE RESPIRATORY SYSTEM: ICD-10-CM

## 2018-12-03 DIAGNOSIS — R53.81 OTHER MALAISE: ICD-10-CM

## 2018-12-03 DIAGNOSIS — R91.1 SOLITARY PULMONARY NODULE: ICD-10-CM

## 2018-12-03 NOTE — ASSESSMENT
[FreeTextEntry1] : 54 yo male with PMHx of schizophrenia, gout, HTN, dyslipidemia, and hemochromatosis presents to the clinic for follow up, has no complaints currently. \par \par \par \par \par \par \par

## 2018-12-03 NOTE — HISTORY OF PRESENT ILLNESS
[FreeTextEntry1] : follow up [de-identified] : 54 yo male with a PMHx of schizophrenia, HTN, dyslipidemia, hemochromatosis presents to the clinic for follow up.  Will follow up with psychiatry on 12/5 and oncology next week. Patient has no complaints currently but needs his medications, Ramipril 2.5 mg, allopurinol 300 mg,  and Aspirin 81 mg, refilled.

## 2018-12-03 NOTE — PLAN
[FreeTextEntry1] : #dyslipidemia\par - lipid panel\par - HGA1C\par - Aspirin 81 mg refilled\par \par # Gout \par - well controlled\par - refilled allopurinol \par - follow with rhematology\par \par #HTN\par - well controlled\par - refilled ramipril 2.5 mg\par \par # Hemochromatosis\par - Follow up with oncology \par \par # Schizophrenia\par - will follow up with psychiatry on 12/45/18\par \par #HCM\par - follow up in 6 months\par

## 2018-12-03 NOTE — PHYSICAL EXAM
[No Acute Distress] : no acute distress [Well-Appearing] : well-appearing [No Respiratory Distress] : no respiratory distress  [Clear to Auscultation] : lungs were clear to auscultation bilaterally [Normal Rate] : normal rate  [Regular Rhythm] : with a regular rhythm [Normal S1, S2] : normal S1 and S2 [No Murmur] : no murmur heard [Soft] : abdomen soft [Non Tender] : non-tender [Non-distended] : non-distended [No Masses] : no abdominal mass palpated [No HSM] : no HSM [Normal Bowel Sounds] : normal bowel sounds [Supple] : supple [No CVA Tenderness] : no CVA  tenderness [No Joint Swelling] : no joint swelling

## 2018-12-04 LAB
CHOLEST SERPL-MCNC: 226 MG/DL
CHOLEST/HDLC SERPL: 2.3 RATIO
ESTIMATED AVERAGE GLUCOSE: 94 MG/DL
HBA1C MFR BLD HPLC: 4.9 %
HDLC SERPL-MCNC: 100 MG/DL
LDLC SERPL CALC-MCNC: 117 MG/DL
TRIGL SERPL-MCNC: 128 MG/DL

## 2018-12-05 DIAGNOSIS — E78.5 HYPERLIPIDEMIA, UNSPECIFIED: ICD-10-CM

## 2018-12-05 DIAGNOSIS — I10 ESSENTIAL (PRIMARY) HYPERTENSION: ICD-10-CM

## 2018-12-05 DIAGNOSIS — Z02.9 ENCOUNTER FOR ADMINISTRATIVE EXAMINATIONS, UNSPECIFIED: ICD-10-CM

## 2018-12-05 DIAGNOSIS — E83.119 HEMOCHROMATOSIS, UNSPECIFIED: ICD-10-CM

## 2018-12-06 DIAGNOSIS — Z79.899 OTHER LONG TERM (CURRENT) DRUG THERAPY: ICD-10-CM

## 2018-12-06 DIAGNOSIS — R53.81 OTHER MALAISE: ICD-10-CM

## 2018-12-06 DIAGNOSIS — E55.9 VITAMIN D DEFICIENCY, UNSPECIFIED: ICD-10-CM

## 2018-12-12 ENCOUNTER — APPOINTMENT (OUTPATIENT)
Dept: INFUSION THERAPY | Facility: CLINIC | Age: 53
End: 2018-12-12

## 2018-12-12 ENCOUNTER — LABORATORY RESULT (OUTPATIENT)
Age: 53
End: 2018-12-12

## 2018-12-13 LAB
FERRITIN SERPL-MCNC: 90 NG/ML
HCT VFR BLD CALC: 41.5 %
HGB BLD-MCNC: 14.2 G/DL
IRON SATN MFR SERPL: 56 %
IRON SERPL-MCNC: 166 UG/DL
MCHC RBC-ENTMCNC: 32.8 PG
MCHC RBC-ENTMCNC: 34.2 G/DL
MCV RBC AUTO: 95.8 FL
PLATELET # BLD AUTO: 160 K/UL
PMV BLD: 10.1 FL
RBC # BLD: 4.33 M/UL
RBC # FLD: 12.9 %
TIBC SERPL-MCNC: 299 UG/DL
UIBC SERPL-MCNC: 133 UG/DL
WBC # FLD AUTO: 4.98 K/UL

## 2018-12-29 ENCOUNTER — OUTPATIENT (OUTPATIENT)
Dept: OUTPATIENT SERVICES | Facility: HOSPITAL | Age: 53
LOS: 1 days | Discharge: HOME | End: 2018-12-29

## 2018-12-29 DIAGNOSIS — Z79.899 OTHER LONG TERM (CURRENT) DRUG THERAPY: ICD-10-CM

## 2019-01-04 ENCOUNTER — APPOINTMENT (OUTPATIENT)
Dept: INFUSION THERAPY | Facility: CLINIC | Age: 54
End: 2019-01-04

## 2019-01-04 ENCOUNTER — LABORATORY RESULT (OUTPATIENT)
Age: 54
End: 2019-01-04

## 2019-01-07 LAB
HCT VFR BLD CALC: 39.4 %
HGB BLD-MCNC: 13.8 G/DL
MCHC RBC-ENTMCNC: 33.5 PG
MCHC RBC-ENTMCNC: 35 G/DL
MCV RBC AUTO: 95.6 FL
PLATELET # BLD AUTO: 177 K/UL
PMV BLD: 10.5 FL
RBC # BLD: 4.12 M/UL
RBC # FLD: 12.2 %
WBC # FLD AUTO: 5.46 K/UL

## 2019-01-14 ENCOUNTER — TRANSCRIPTION ENCOUNTER (OUTPATIENT)
Age: 54
End: 2019-01-14

## 2019-01-14 ENCOUNTER — OUTPATIENT (OUTPATIENT)
Dept: OUTPATIENT SERVICES | Facility: HOSPITAL | Age: 54
LOS: 1 days | Discharge: HOME | End: 2019-01-14

## 2019-01-14 VITALS
HEART RATE: 90 BPM | DIASTOLIC BLOOD PRESSURE: 70 MMHG | TEMPERATURE: 97 F | OXYGEN SATURATION: 99 % | WEIGHT: 160.06 LBS | SYSTOLIC BLOOD PRESSURE: 123 MMHG | HEIGHT: 70 IN | RESPIRATION RATE: 15 BRPM

## 2019-01-14 VITALS — HEART RATE: 83 BPM | DIASTOLIC BLOOD PRESSURE: 82 MMHG | RESPIRATION RATE: 15 BRPM | SYSTOLIC BLOOD PRESSURE: 127 MMHG

## 2019-01-14 DIAGNOSIS — Z98.890 OTHER SPECIFIED POSTPROCEDURAL STATES: Chronic | ICD-10-CM

## 2019-01-14 NOTE — ASU DISCHARGE PLAN (ADULT/PEDIATRIC). - NOTIFY
Unable to Urinate/Persistent Nausea and Vomiting/Numbness, color, or temperature change to extremity/Swelling that continues

## 2019-01-14 NOTE — PRE-ANESTHESIA EVALUATION ADULT - NSANTHPMHFT_GEN_ALL_CORE
Chart reviewed, pt interviewed and examined.  Sz disorder, Depakote level checked 2 months ago- WNL as per pt.

## 2019-01-16 DIAGNOSIS — F41.8 OTHER SPECIFIED ANXIETY DISORDERS: ICD-10-CM

## 2019-01-16 DIAGNOSIS — Z86.010 PERSONAL HISTORY OF COLONIC POLYPS: ICD-10-CM

## 2019-01-16 DIAGNOSIS — R93.2 ABNORMAL FINDINGS ON DIAGNOSTIC IMAGING OF LIVER AND BILIARY TRACT: ICD-10-CM

## 2019-01-16 DIAGNOSIS — M10.9 GOUT, UNSPECIFIED: ICD-10-CM

## 2019-01-16 DIAGNOSIS — I10 ESSENTIAL (PRIMARY) HYPERTENSION: ICD-10-CM

## 2019-01-17 PROBLEM — M10.9 GOUT, UNSPECIFIED: Chronic | Status: ACTIVE | Noted: 2019-01-14

## 2019-01-17 PROBLEM — G40.409 OTHER GENERALIZED EPILEPSY AND EPILEPTIC SYNDROMES, NOT INTRACTABLE, WITHOUT STATUS EPILEPTICUS: Chronic | Status: ACTIVE | Noted: 2019-01-14

## 2019-01-17 PROBLEM — F41.9 ANXIETY DISORDER, UNSPECIFIED: Chronic | Status: ACTIVE | Noted: 2019-01-14

## 2019-01-17 PROBLEM — I10 ESSENTIAL (PRIMARY) HYPERTENSION: Chronic | Status: ACTIVE | Noted: 2019-01-14

## 2019-01-17 PROBLEM — F32.9 MAJOR DEPRESSIVE DISORDER, SINGLE EPISODE, UNSPECIFIED: Chronic | Status: ACTIVE | Noted: 2019-01-14

## 2019-01-28 ENCOUNTER — OUTPATIENT (OUTPATIENT)
Dept: OUTPATIENT SERVICES | Facility: HOSPITAL | Age: 54
LOS: 1 days | Discharge: HOME | End: 2019-01-28

## 2019-01-28 DIAGNOSIS — Z79.899 OTHER LONG TERM (CURRENT) DRUG THERAPY: ICD-10-CM

## 2019-01-28 DIAGNOSIS — Z98.890 OTHER SPECIFIED POSTPROCEDURAL STATES: Chronic | ICD-10-CM

## 2019-02-12 ENCOUNTER — LABORATORY RESULT (OUTPATIENT)
Age: 54
End: 2019-02-12

## 2019-02-12 ENCOUNTER — APPOINTMENT (OUTPATIENT)
Dept: INFUSION THERAPY | Facility: CLINIC | Age: 54
End: 2019-02-12

## 2019-02-12 ENCOUNTER — APPOINTMENT (OUTPATIENT)
Dept: HEMATOLOGY ONCOLOGY | Facility: CLINIC | Age: 54
End: 2019-02-12

## 2019-02-12 ENCOUNTER — OUTPATIENT (OUTPATIENT)
Dept: OUTPATIENT SERVICES | Facility: HOSPITAL | Age: 54
LOS: 1 days | Discharge: HOME | End: 2019-02-12

## 2019-02-12 VITALS
RESPIRATION RATE: 16 BRPM | HEART RATE: 94 BPM | DIASTOLIC BLOOD PRESSURE: 80 MMHG | TEMPERATURE: 96.9 F | SYSTOLIC BLOOD PRESSURE: 140 MMHG | BODY MASS INDEX: 23.99 KG/M2 | HEIGHT: 69 IN | WEIGHT: 162 LBS

## 2019-02-12 DIAGNOSIS — Z98.890 OTHER SPECIFIED POSTPROCEDURAL STATES: Chronic | ICD-10-CM

## 2019-02-12 DIAGNOSIS — E83.110 HEREDITARY HEMOCHROMATOSIS: ICD-10-CM

## 2019-02-12 LAB
HCT VFR BLD CALC: 41.4 %
HGB BLD-MCNC: 14.1 G/DL
MCHC RBC-ENTMCNC: 33 PG
MCHC RBC-ENTMCNC: 34.1 G/DL
MCV RBC AUTO: 97 FL
PLATELET # BLD AUTO: 181 K/UL
PMV BLD: 10.3 FL
RBC # BLD: 4.27 M/UL
RBC # FLD: 12 %
WBC # FLD AUTO: 4.55 K/UL

## 2019-02-12 NOTE — ASSESSMENT
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  The patient has been getting phlebotomies as needed.\par \par PLAN:  Will  check  ferritin and CBC today and every  3 months.  If the ferritin is not a goal, ferritin of 50 to 100; we will arrange more  phlebotomy. \par -will check US abdomen now and again in e very 6 months, will stop AFP testing since some guidance are against it and will only use US liver from now\par \par #Questionable SCC on his forehead\par -he will see a dermatologist he told me he will ararnge it\par \par The patient also knows not to take iron supplements as well as not to drink any alcoholic beverages.\par \par #BP was 140 systolic will monitor for now sees Dr. Ramos \par \par RTC in 6  months with  lab work CBC and ferritin in 3 months. but will adjust as needed \par \par

## 2019-02-12 NOTE — HISTORY OF PRESENT ILLNESS
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  He also had a liver biopsy, consistent with iron overload.  He has been getting intermittent phlebotomies.  He has not had phlebotomy now in several months.  His most recent ferritin was from 08/16/2016; it demonstrated a ferritin of 58.  He now presents for followup evaluation.  He states he is doing quite well.  He has no complaints.\par  [de-identified] : 1/23/17\par He presents for follow up. His ferritin in Oct was 101 and he underwent a phlebotomy of 1 unit. HIs  AFP was 7 and ultrasound of the liver was normal in November. He comes in for follow up. Hs no complaints.\par \par 4/19/17\par He presents for follow up. Last ferritin  was under 100.  He has no complaints,\par \par 7/18/17\par No events. Denies weight loss, fevers. No new meds. Last AFP an ferritin were WNL\par \par 10/31/17\par He is doing well. He had a phlebotomyin September and October. No complaints\par \par 1/23/18\par He is here for follow up for hemochromatisis. He has no complaints. He had  CT abd/Pelvis this month that was normal. His Ferretin from this month is in the 60s.\par \par 04/24/2018\par Patient is here for scheduled follow up visit. He denies any complaints. His last ferritin(03/2018) was 81.\par \par 8/14/18\par He is here for follow up.  HIs ferritin from 8/10 was 98. He has no complaints. Last AFP 2.5 in April\par \par 2/12/19\par He is here for follow up.  He had US in 8/2018 by Dr. Aviles IMPRESSION: 1.  Bilateral renal cysts.  2.  Pancreas is mostly obscured by overlying bowel gas and cannot be  assessed.  3.  Otherwise, unremarkable abdominal ultrasound.\par Last Ferritin was 90 in 12/2018.\par \par He is doing well has no complaints.

## 2019-02-12 NOTE — PHYSICAL EXAM
[Fully active, able to carry on all pre-disease performance without restriction] : Status 0 - Fully active, able to carry on all pre-disease performance without restriction [Normal] : affect appropriate [de-identified] : He has dry skin on forehead he also has  a small whitish lesion maybe an SCC?

## 2019-02-14 LAB
FERRITIN SERPL-MCNC: 50 NG/ML
IRON SATN MFR SERPL: 42 %
IRON SERPL-MCNC: 150 UG/DL
TIBC SERPL-MCNC: 359 UG/DL
UIBC SERPL-MCNC: 209 UG/DL

## 2019-02-15 ENCOUNTER — OUTPATIENT (OUTPATIENT)
Dept: OUTPATIENT SERVICES | Facility: HOSPITAL | Age: 54
LOS: 1 days | Discharge: HOME | End: 2019-02-15

## 2019-02-15 ENCOUNTER — LABORATORY RESULT (OUTPATIENT)
Age: 54
End: 2019-02-15

## 2019-02-15 ENCOUNTER — APPOINTMENT (OUTPATIENT)
Dept: INTERNAL MEDICINE | Facility: CLINIC | Age: 54
End: 2019-02-15

## 2019-02-15 DIAGNOSIS — Z00.00 ENCOUNTER FOR GENERAL ADULT MEDICAL EXAMINATION WITHOUT ABNORMAL FINDINGS: ICD-10-CM

## 2019-02-15 DIAGNOSIS — Z98.890 OTHER SPECIFIED POSTPROCEDURAL STATES: Chronic | ICD-10-CM

## 2019-02-15 DIAGNOSIS — E83.119 HEMOCHROMATOSIS, UNSPECIFIED: ICD-10-CM

## 2019-02-15 NOTE — HISTORY OF PRESENT ILLNESS
[FreeTextEntry1] : Follow up visit  [de-identified] : This patient is 52 y/o male with past medical history of DLD, Gout, Hemochromatosis, HTN, Schizophrenia and seizure disorder. He is here for a follow up visit. He has no acute complaints and feels healthy otherwise. He is compliant with his medications.

## 2019-02-15 NOTE — PLAN
[FreeTextEntry1] : This patient is 54 y/o male with past medical history of DLD, Gout, Hemochromatosis, HTN, Schizophrenia and seizure disorder. He is here for a follow up visit. He has no acute complaints and feels healthy otherwise. He is compliant with his medications. \par \par \par Health care maintenance \par \par 1- Gout \par Stable on allpourinol \par \par 2-Hemochromatosis \par - Stbale- Hct is 41 \par - Continue to follow heme/onc \par \par 3- Schizophrenia \par - Patient is on clozapine \par - Continue following Psychiatry on out patient \par \par 4- Seizure disorder\par - Stable on Depakote \par \par 5- HTN\par - Stable- well controlled on Ramipril \par - We will continue \par \par 6- Health care maintenance \par - Up to date with flu vaccine \par - Patient following GI for repeat colonoscopy. Previously colonoscopy was not done last month because of poor preparation \par - We will send blood work for next visit

## 2019-02-15 NOTE — PHYSICAL EXAM
[No Acute Distress] : no acute distress [Well Nourished] : well nourished [Normal Sclera/Conjunctiva] : normal sclera/conjunctiva [Normal Outer Ear/Nose] : the outer ears and nose were normal in appearance [No JVD] : no jugular venous distention [Supple] : supple [No Respiratory Distress] : no respiratory distress  [Clear to Auscultation] : lungs were clear to auscultation bilaterally [No Accessory Muscle Use] : no accessory muscle use [Normal Rate] : normal rate  [Regular Rhythm] : with a regular rhythm [Normal S1, S2] : normal S1 and S2 [No Murmur] : no murmur heard [Soft] : abdomen soft [Non Tender] : non-tender [No Rash] : no rash

## 2019-02-19 DIAGNOSIS — Z02.9 ENCOUNTER FOR ADMINISTRATIVE EXAMINATIONS, UNSPECIFIED: ICD-10-CM

## 2019-02-19 LAB
ALBUMIN SERPL ELPH-MCNC: 4.9 G/DL
ALP BLD-CCNC: 60 U/L
ALT SERPL-CCNC: 17 U/L
ANION GAP SERPL CALC-SCNC: 12 MMOL/L
AST SERPL-CCNC: 20 U/L
BASOPHILS # BLD AUTO: 0.03 K/UL
BASOPHILS NFR BLD AUTO: 0.6 %
BILIRUB SERPL-MCNC: 0.5 MG/DL
BUN SERPL-MCNC: 16 MG/DL
CALCIUM SERPL-MCNC: 9.5 MG/DL
CHLORIDE SERPL-SCNC: 103 MMOL/L
CHOLEST SERPL-MCNC: 236 MG/DL
CHOLEST/HDLC SERPL: 2.4 RATIO
CO2 SERPL-SCNC: 30 MMOL/L
CREAT SERPL-MCNC: 1 MG/DL
EOSINOPHIL # BLD AUTO: 0.12 K/UL
EOSINOPHIL NFR BLD AUTO: 2.2 %
GLUCOSE SERPL-MCNC: 87 MG/DL
HCT VFR BLD CALC: 43.7 %
HDLC SERPL-MCNC: 99 MG/DL
HGB BLD-MCNC: 14.4 G/DL
IMM GRANULOCYTES NFR BLD AUTO: 0.2 %
LDLC SERPL CALC-MCNC: 141 MG/DL
LYMPHOCYTES # BLD AUTO: 2.35 K/UL
LYMPHOCYTES NFR BLD AUTO: 43.5 %
MAN DIFF?: NORMAL
MCHC RBC-ENTMCNC: 32.3 PG
MCHC RBC-ENTMCNC: 33 G/DL
MCV RBC AUTO: 98 FL
MONOCYTES # BLD AUTO: 0.5 K/UL
MONOCYTES NFR BLD AUTO: 9.3 %
NEUTROPHILS # BLD AUTO: 2.39 K/UL
NEUTROPHILS NFR BLD AUTO: 44.2 %
PLATELET # BLD AUTO: 179 K/UL
POTASSIUM SERPL-SCNC: 4.5 MMOL/L
PROT SERPL-MCNC: 7.2 G/DL
RBC # BLD: 4.46 M/UL
RBC # FLD: 12 %
SODIUM SERPL-SCNC: 145 MMOL/L
TRIGL SERPL-MCNC: 95 MG/DL
TSH SERPL-ACNC: 2.54 UIU/ML
WBC # FLD AUTO: 5.4 K/UL

## 2019-02-21 ENCOUNTER — LABORATORY RESULT (OUTPATIENT)
Age: 54
End: 2019-02-21

## 2019-02-21 ENCOUNTER — OUTPATIENT (OUTPATIENT)
Dept: OUTPATIENT SERVICES | Facility: HOSPITAL | Age: 54
LOS: 1 days | Discharge: HOME | End: 2019-02-21

## 2019-02-21 DIAGNOSIS — Z00.00 ENCOUNTER FOR GENERAL ADULT MEDICAL EXAMINATION WITHOUT ABNORMAL FINDINGS: ICD-10-CM

## 2019-02-21 DIAGNOSIS — Z98.890 OTHER SPECIFIED POSTPROCEDURAL STATES: Chronic | ICD-10-CM

## 2019-02-22 LAB
APPEARANCE: CLEAR
BILIRUBIN URINE: NEGATIVE
BLOOD URINE: NEGATIVE
COLOR: YELLOW
GLUCOSE QUALITATIVE U: NEGATIVE MG/DL
KETONES URINE: ABNORMAL
LEUKOCYTE ESTERASE URINE: NEGATIVE
NITRITE URINE: NEGATIVE
PH URINE: 7.5
PROTEIN URINE: NEGATIVE MG/DL
SPECIFIC GRAVITY URINE: 1.02
UROBILINOGEN URINE: 1 MG/DL (ref 0.2–?)

## 2019-02-24 LAB
HBV SURFACE AB SER QL: NONREACTIVE
HBV SURFACE AG SER QL: NONREACTIVE
MEV IGG FLD QL IA: 10.1 AU/ML
MEV IGG+IGM SER-IMP: NEGATIVE
MUV AB SER-ACNC: POSITIVE
MUV IGG SER QL IA: 150 AU/ML
RUBV IGG FLD-ACNC: 4.5 INDEX
RUBV IGG SER-IMP: POSITIVE

## 2019-02-25 ENCOUNTER — OUTPATIENT (OUTPATIENT)
Dept: OUTPATIENT SERVICES | Facility: HOSPITAL | Age: 54
LOS: 1 days | Discharge: HOME | End: 2019-02-25

## 2019-02-25 DIAGNOSIS — Z79.899 OTHER LONG TERM (CURRENT) DRUG THERAPY: ICD-10-CM

## 2019-02-25 DIAGNOSIS — Z98.890 OTHER SPECIFIED POSTPROCEDURAL STATES: Chronic | ICD-10-CM

## 2019-02-26 LAB
M TB IFN-G BLD-IMP: NEGATIVE
QUANTIFERON TB PLUS MITOGEN MINUS NIL: 2.31 IU/ML
QUANTIFERON TB PLUS NIL: 0.02 IU/ML
QUANTIFERON TB PLUS TB1 MINUS NIL: 0.01 IU/ML
QUANTIFERON TB PLUS TB2 MINUS NIL: 0.01 IU/ML

## 2019-02-28 DIAGNOSIS — E83.110 HEREDITARY HEMOCHROMATOSIS: ICD-10-CM

## 2019-02-28 DIAGNOSIS — Z00.00 ENCOUNTER FOR GENERAL ADULT MEDICAL EXAMINATION WITHOUT ABNORMAL FINDINGS: ICD-10-CM

## 2019-02-28 DIAGNOSIS — M10.9 GOUT, UNSPECIFIED: ICD-10-CM

## 2019-03-25 ENCOUNTER — OUTPATIENT (OUTPATIENT)
Dept: OUTPATIENT SERVICES | Facility: HOSPITAL | Age: 54
LOS: 1 days | Discharge: HOME | End: 2019-03-25

## 2019-03-25 DIAGNOSIS — Z79.899 OTHER LONG TERM (CURRENT) DRUG THERAPY: ICD-10-CM

## 2019-03-25 DIAGNOSIS — F20.9 SCHIZOPHRENIA, UNSPECIFIED: ICD-10-CM

## 2019-03-25 DIAGNOSIS — Z98.890 OTHER SPECIFIED POSTPROCEDURAL STATES: Chronic | ICD-10-CM

## 2019-04-22 ENCOUNTER — OUTPATIENT (OUTPATIENT)
Dept: OUTPATIENT SERVICES | Facility: HOSPITAL | Age: 54
LOS: 1 days | Discharge: HOME | End: 2019-04-22

## 2019-04-22 DIAGNOSIS — Z98.890 OTHER SPECIFIED POSTPROCEDURAL STATES: Chronic | ICD-10-CM

## 2019-04-22 DIAGNOSIS — Z79.899 OTHER LONG TERM (CURRENT) DRUG THERAPY: ICD-10-CM

## 2019-04-22 DIAGNOSIS — F20.9 SCHIZOPHRENIA, UNSPECIFIED: ICD-10-CM

## 2019-05-11 ENCOUNTER — OUTPATIENT (OUTPATIENT)
Dept: OUTPATIENT SERVICES | Facility: HOSPITAL | Age: 54
LOS: 1 days | Discharge: HOME | End: 2019-05-11

## 2019-05-11 DIAGNOSIS — M10.9 GOUT, UNSPECIFIED: ICD-10-CM

## 2019-05-11 DIAGNOSIS — R79.89 OTHER SPECIFIED ABNORMAL FINDINGS OF BLOOD CHEMISTRY: ICD-10-CM

## 2019-05-11 DIAGNOSIS — Z98.890 OTHER SPECIFIED POSTPROCEDURAL STATES: Chronic | ICD-10-CM

## 2019-05-11 DIAGNOSIS — D50.8 OTHER IRON DEFICIENCY ANEMIAS: ICD-10-CM

## 2019-05-14 ENCOUNTER — LABORATORY RESULT (OUTPATIENT)
Age: 54
End: 2019-05-14

## 2019-05-14 ENCOUNTER — APPOINTMENT (OUTPATIENT)
Dept: HEMATOLOGY ONCOLOGY | Facility: CLINIC | Age: 54
End: 2019-05-14

## 2019-05-14 LAB
HCT VFR BLD CALC: 44.1 %
HGB BLD-MCNC: 15.1 G/DL
IRON SATN MFR SERPL: 56 %
IRON SERPL-MCNC: 179 UG/DL
MCHC RBC-ENTMCNC: 32.8 PG
MCHC RBC-ENTMCNC: 34.2 G/DL
MCV RBC AUTO: 95.7 FL
PLATELET # BLD AUTO: 142 K/UL
PMV BLD: 10.1 FL
RBC # BLD: 4.61 M/UL
RBC # FLD: 13.2 %
TIBC SERPL-MCNC: 317 UG/DL
UIBC SERPL-MCNC: 138 UG/DL
WBC # FLD AUTO: 5.59 K/UL

## 2019-05-16 LAB — FERRITIN SERPL-MCNC: 94 NG/ML

## 2019-05-20 ENCOUNTER — OUTPATIENT (OUTPATIENT)
Dept: OUTPATIENT SERVICES | Facility: HOSPITAL | Age: 54
LOS: 1 days | Discharge: HOME | End: 2019-05-20

## 2019-05-20 DIAGNOSIS — Z98.890 OTHER SPECIFIED POSTPROCEDURAL STATES: Chronic | ICD-10-CM

## 2019-05-20 DIAGNOSIS — Z79.899 OTHER LONG TERM (CURRENT) DRUG THERAPY: ICD-10-CM

## 2019-05-20 DIAGNOSIS — F20.9 SCHIZOPHRENIA, UNSPECIFIED: ICD-10-CM

## 2019-05-28 ENCOUNTER — APPOINTMENT (OUTPATIENT)
Dept: INTERNAL MEDICINE | Facility: CLINIC | Age: 54
End: 2019-05-28

## 2019-05-28 ENCOUNTER — OUTPATIENT (OUTPATIENT)
Dept: OUTPATIENT SERVICES | Facility: HOSPITAL | Age: 54
LOS: 1 days | Discharge: HOME | End: 2019-05-28

## 2019-05-28 VITALS
DIASTOLIC BLOOD PRESSURE: 71 MMHG | HEIGHT: 69 IN | HEART RATE: 92 BPM | TEMPERATURE: 98.6 F | WEIGHT: 160 LBS | BODY MASS INDEX: 23.7 KG/M2 | SYSTOLIC BLOOD PRESSURE: 103 MMHG

## 2019-05-28 DIAGNOSIS — Z98.890 OTHER SPECIFIED POSTPROCEDURAL STATES: Chronic | ICD-10-CM

## 2019-05-28 NOTE — PHYSICAL EXAM
[No Acute Distress] : no acute distress [No JVD] : no jugular venous distention [No Respiratory Distress] : no respiratory distress  [Clear to Auscultation] : lungs were clear to auscultation bilaterally [Normal Rate] : normal rate  [No Accessory Muscle Use] : no accessory muscle use [Regular Rhythm] : with a regular rhythm [Normal S1, S2] : normal S1 and S2 [No Edema] : there was no peripheral edema [No CVA Tenderness] : no CVA  tenderness [No Rash] : no rash [No Focal Deficits] : no focal deficits

## 2019-05-31 NOTE — ASSESSMENT
[FreeTextEntry1] : 54 y/o male with past medical history of DLD, Gout, Hemochromatosis, HTN, Schizophrenia and seizure disorder. He is here for a follow up visit. \par \par # Gout \par Stable on allopurinol \par - check uric acid level\par \par # Hemochromatosis \par - Stablel; ferritin- 94; goal ; repeat every 3 months\par - Continue to follow heme/onc \par \par # Schizophrenia \par - Patient is on clozapine \par - Continue following Psychiatry on out patient \par \par # Seizure disorder\par - Stable on Depakote \par \par # HTN\par - Stable- well controlled on Ramipril \par \par # Health care maintenance \par - Up to date with flu vaccine \par - GI for colonoscopy.

## 2019-05-31 NOTE — REVIEW OF SYSTEMS
[Fever] : no fever [Discharge] : no discharge [Earache] : no earache [Chest Pain] : no chest pain [Palpitations] : no palpitations [Lower Ext Edema] : no lower extremity edema [Shortness Of Breath] : no shortness of breath [Wheezing] : no wheezing [Cough] : no cough [Abdominal Pain] : no abdominal pain [Nausea] : no nausea [Constipation] : no constipation [Heartburn] : no heartburn [Dysuria] : no dysuria [Joint Pain] : no joint pain [Headache] : no headache [Suicidal] : not suicidal

## 2019-05-31 NOTE — HISTORY OF PRESENT ILLNESS
[de-identified] : 52 y/o male with past medical history of DLD, Gout, Hemochromatosis, HTN, Schizophrenia and seizure disorder. He is here for a follow up visit.

## 2019-06-03 DIAGNOSIS — I10 ESSENTIAL (PRIMARY) HYPERTENSION: ICD-10-CM

## 2019-06-03 DIAGNOSIS — E78.5 HYPERLIPIDEMIA, UNSPECIFIED: ICD-10-CM

## 2019-06-03 DIAGNOSIS — E83.119 HEMOCHROMATOSIS, UNSPECIFIED: ICD-10-CM

## 2019-06-17 ENCOUNTER — OUTPATIENT (OUTPATIENT)
Dept: OUTPATIENT SERVICES | Facility: HOSPITAL | Age: 54
LOS: 1 days | Discharge: HOME | End: 2019-06-17

## 2019-06-17 DIAGNOSIS — F20.9 SCHIZOPHRENIA, UNSPECIFIED: ICD-10-CM

## 2019-06-17 DIAGNOSIS — Z98.890 OTHER SPECIFIED POSTPROCEDURAL STATES: Chronic | ICD-10-CM

## 2019-07-19 ENCOUNTER — APPOINTMENT (OUTPATIENT)
Dept: GASTROENTEROLOGY | Facility: CLINIC | Age: 54
End: 2019-07-19
Payer: MEDICARE

## 2019-07-19 ENCOUNTER — OUTPATIENT (OUTPATIENT)
Dept: OUTPATIENT SERVICES | Facility: HOSPITAL | Age: 54
LOS: 1 days | Discharge: HOME | End: 2019-07-19

## 2019-07-19 VITALS
WEIGHT: 158 LBS | HEART RATE: 70 BPM | BODY MASS INDEX: 23.4 KG/M2 | DIASTOLIC BLOOD PRESSURE: 88 MMHG | SYSTOLIC BLOOD PRESSURE: 126 MMHG | HEIGHT: 69 IN

## 2019-07-19 DIAGNOSIS — Z98.890 OTHER SPECIFIED POSTPROCEDURAL STATES: Chronic | ICD-10-CM

## 2019-07-19 PROCEDURE — 99213 OFFICE O/P EST LOW 20 MIN: CPT | Mod: GC

## 2019-07-19 NOTE — HISTORY OF PRESENT ILLNESS
[de-identified] : 52 yo M with PMHx of DLD, gout, hemochromatosis, essential hypertension, schizophrenia, seizure disorder presents for screening colonoscopy. He has gotten a colonoscopy in 2016, fair prep, showing a single small polyp in the ascending colon removed by cold biopsy polypectomy; prominent fold vs sarah-appendiceal polyp was biopsied as well. Pathology showed tubular adenoma with small focal villous features without high-grade dysplasia identified for appendiceal orifice polyp. He had another colonoscopy on 08/2017 which was poor prep and terminated. Colonoscopy attempted again in 11/2017 which was poor prep with findings of internal grade II hemorrhoids, two hyperplastic polyps in the rectum removed by cold biopsy polypectomy, single tubular adenoma in ascending removed by snare cautery, and large amount of retained semiformed stool. Colonoscopy was recommended in 1 year.  Colonoscopy was done in 01/2019 which was poor prep. Plan is to repeat colonoscopy. He noted weight loss of 170 lb as of January 2017, dropped to 158 lbs today. Denies melena, hematochezia, abdominal pain. \par

## 2019-07-19 NOTE — ASSESSMENT
[FreeTextEntry1] : 52 yo M with PMHx of DLD, gout, hemochromatosis, essential hypertension, schizophrenia, seizure disorder presents for screening colonoscopy. \par \par #) Incomplete screening colonoscopy\par - Last Colonoscopy in 01/2019 failed due to poor prep\par - Prior Colonoscopy in 11/2017 showing internal grade II hemorrhoids, two hyperplastic polyps in the rectum, single tubular adenoma in ascending. \par - Prior Colonoscopy in 2016 showing tubular adenoma with small focal villous features without high-grade dysplasia at appendiceal orifice \par - Average risk for colorectal cancer \par - Will give Dulcolax, Golytely, Miralax for 1 weeks. \par - Will schedule for Colonoscopy with Dr. Steen\par \par #) Unintentional weight loss\par - Will schedule for EGD with Dr. Steen\par \par #) Hereditary hemochromatosis\par - US Abdomen (08/2018): Unremarkable for liver/gallbladder pathology

## 2019-07-23 DIAGNOSIS — Z12.11 ENCOUNTER FOR SCREENING FOR MALIGNANT NEOPLASM OF COLON: ICD-10-CM

## 2019-07-23 DIAGNOSIS — R63.4 ABNORMAL WEIGHT LOSS: ICD-10-CM

## 2019-08-02 ENCOUNTER — TRANSCRIPTION ENCOUNTER (OUTPATIENT)
Age: 54
End: 2019-08-02

## 2019-08-02 ENCOUNTER — RESULT REVIEW (OUTPATIENT)
Age: 54
End: 2019-08-02

## 2019-08-02 ENCOUNTER — OUTPATIENT (OUTPATIENT)
Dept: OUTPATIENT SERVICES | Facility: HOSPITAL | Age: 54
LOS: 1 days | Discharge: HOME | End: 2019-08-02
Payer: MEDICARE

## 2019-08-02 VITALS
HEART RATE: 101 BPM | DIASTOLIC BLOOD PRESSURE: 73 MMHG | TEMPERATURE: 97 F | HEIGHT: 69 IN | SYSTOLIC BLOOD PRESSURE: 117 MMHG | RESPIRATION RATE: 19 BRPM | WEIGHT: 160.06 LBS | OXYGEN SATURATION: 98 %

## 2019-08-02 VITALS — HEART RATE: 77 BPM | RESPIRATION RATE: 18 BRPM | SYSTOLIC BLOOD PRESSURE: 123 MMHG | DIASTOLIC BLOOD PRESSURE: 84 MMHG

## 2019-08-02 DIAGNOSIS — Z98.890 OTHER SPECIFIED POSTPROCEDURAL STATES: Chronic | ICD-10-CM

## 2019-08-02 DIAGNOSIS — Z86.010 PERSONAL HISTORY OF COLONIC POLYPS: ICD-10-CM

## 2019-08-02 PROCEDURE — 45380 COLONOSCOPY AND BIOPSY: CPT | Mod: XU

## 2019-08-02 PROCEDURE — 45385 COLONOSCOPY W/LESION REMOVAL: CPT

## 2019-08-02 PROCEDURE — 88305 TISSUE EXAM BY PATHOLOGIST: CPT | Mod: 26

## 2019-08-02 RX ORDER — ASPIRIN/CALCIUM CARB/MAGNESIUM 324 MG
1 TABLET ORAL
Qty: 0 | Refills: 0 | DISCHARGE

## 2019-08-02 RX ORDER — DIVALPROEX SODIUM 500 MG/1
1 TABLET, DELAYED RELEASE ORAL
Qty: 0 | Refills: 0 | DISCHARGE

## 2019-08-02 RX ORDER — ALLOPURINOL 300 MG
0 TABLET ORAL
Qty: 0 | Refills: 0 | DISCHARGE

## 2019-08-02 RX ORDER — CLOZAPINE 150 MG/1
1 TABLET, ORALLY DISINTEGRATING ORAL
Qty: 0 | Refills: 0 | DISCHARGE

## 2019-08-02 RX ORDER — RAMIPRIL 5 MG
0 CAPSULE ORAL
Qty: 0 | Refills: 0 | DISCHARGE

## 2019-08-02 NOTE — ASU DISCHARGE PLAN (ADULT/PEDIATRIC) - CALL YOUR DOCTOR IF YOU HAVE ANY OF THE FOLLOWING:
Pain not relieved by Medications/Swelling that gets worse/Fever greater than (need to indicate Fahrenheit or Celsius)/Bleeding that does not stop/Unable to urinate

## 2019-08-02 NOTE — ASU DISCHARGE PLAN (ADULT/PEDIATRIC) - CARE PROVIDER_API CALL
Luis Enrique Steen)  Gastroenterology; Internal Medicine  [address] [place] [address]  Phone: [phone]  Fax: [phone]  Follow Up Time: 1 month

## 2019-08-02 NOTE — ASU PATIENT PROFILE, ADULT - PMH
Acute anxiety    Acute depression    Acute gout    Epilepsy with grand mal seizures on awakening  HX OF SEIZURE  DUE TO MEDICATION  LOW LEVELS  Hypertension

## 2019-08-02 NOTE — H&P PST ADULT - NSICDXPASTMEDICALHX_GEN_ALL_CORE_FT
PAST MEDICAL HISTORY:  Acute anxiety     Acute depression     Acute gout     Epilepsy with grand mal seizures on awakening HX OF SEIZURE  DUE TO MEDICATION  LOW LEVELS    Hypertension

## 2019-08-06 LAB — SURGICAL PATHOLOGY STUDY: SIGNIFICANT CHANGE UP

## 2019-08-08 DIAGNOSIS — D12.3 BENIGN NEOPLASM OF TRANSVERSE COLON: ICD-10-CM

## 2019-08-08 DIAGNOSIS — F41.3 OTHER MIXED ANXIETY DISORDERS: ICD-10-CM

## 2019-08-08 DIAGNOSIS — G40.409 OTHER GENERALIZED EPILEPSY AND EPILEPTIC SYNDROMES, NOT INTRACTABLE, WITHOUT STATUS EPILEPTICUS: ICD-10-CM

## 2019-08-08 DIAGNOSIS — Z79.82 LONG TERM (CURRENT) USE OF ASPIRIN: ICD-10-CM

## 2019-08-08 DIAGNOSIS — M10.9 GOUT, UNSPECIFIED: ICD-10-CM

## 2019-08-08 DIAGNOSIS — I10 ESSENTIAL (PRIMARY) HYPERTENSION: ICD-10-CM

## 2019-08-12 ENCOUNTER — OUTPATIENT (OUTPATIENT)
Dept: OUTPATIENT SERVICES | Facility: HOSPITAL | Age: 54
LOS: 1 days | Discharge: HOME | End: 2019-08-12

## 2019-08-12 DIAGNOSIS — F20.9 SCHIZOPHRENIA, UNSPECIFIED: ICD-10-CM

## 2019-08-12 DIAGNOSIS — Z79.899 OTHER LONG TERM (CURRENT) DRUG THERAPY: ICD-10-CM

## 2019-08-12 DIAGNOSIS — Z98.890 OTHER SPECIFIED POSTPROCEDURAL STATES: Chronic | ICD-10-CM

## 2019-08-13 ENCOUNTER — LABORATORY RESULT (OUTPATIENT)
Age: 54
End: 2019-08-13

## 2019-08-13 ENCOUNTER — APPOINTMENT (OUTPATIENT)
Dept: HEMATOLOGY ONCOLOGY | Facility: CLINIC | Age: 54
End: 2019-08-13
Payer: MEDICARE

## 2019-08-13 VITALS
WEIGHT: 158 LBS | SYSTOLIC BLOOD PRESSURE: 115 MMHG | TEMPERATURE: 96.3 F | BODY MASS INDEX: 23.4 KG/M2 | RESPIRATION RATE: 16 BRPM | HEART RATE: 84 BPM | DIASTOLIC BLOOD PRESSURE: 82 MMHG | HEIGHT: 69 IN

## 2019-08-13 PROCEDURE — 99213 OFFICE O/P EST LOW 20 MIN: CPT

## 2019-08-14 LAB
FERRITIN SERPL-MCNC: 132 NG/ML
HCT VFR BLD CALC: 42 %
HGB BLD-MCNC: 14.6 G/DL
IRON SATN MFR SERPL: 63 %
IRON SERPL-MCNC: 186 UG/DL
MCHC RBC-ENTMCNC: 33.3 PG
MCHC RBC-ENTMCNC: 34.8 G/DL
MCV RBC AUTO: 95.9 FL
PLATELET # BLD AUTO: 154 K/UL
PMV BLD: 9.9 FL
RBC # BLD: 4.38 M/UL
RBC # FLD: 11.9 %
TIBC SERPL-MCNC: 293 UG/DL
UIBC SERPL-MCNC: 107 UG/DL
WBC # FLD AUTO: 4.66 K/UL

## 2019-08-14 NOTE — ASSESSMENT
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  The patient has been getting phlebotomies as needed.\par \par PLAN:  Will  check  ferritin and CBC today and every  3 months.  If the ferritin is not a goal, ferritin of 50 to 100; we will arrange more  phlebotomy. \par -will check US abdomen now and again in e very 6 months\par \par #Questionable SCC on his forehead\par -follows with derm\par \par The patient also knows not to take iron supplements as well as not to drink any alcoholic beverages.\par \par #BP is better now\par \par RTC in 6  months with  lab work CBC and ferritin in 3 months. but will adjust as needed especailly if needs phlebotomy\par \par \par \par

## 2019-08-14 NOTE — HISTORY OF PRESENT ILLNESS
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  He also had a liver biopsy, consistent with iron overload.  He has been getting intermittent phlebotomies.  He has not had phlebotomy now in several months.  His most recent ferritin was from 08/16/2016; it demonstrated a ferritin of 58.  He now presents for followup evaluation.  He states he is doing quite well.  He has no complaints.\par  [de-identified] : 1/23/17\par He presents for follow up. His ferritin in Oct was 101 and he underwent a phlebotomy of 1 unit. HIs  AFP was 7 and ultrasound of the liver was normal in November. He comes in for follow up. Hs no complaints.\par \par 4/19/17\par He presents for follow up. Last ferritin  was under 100.  He has no complaints,\par \par 7/18/17\par No events. Denies weight loss, fevers. No new meds. Last AFP an ferritin were WNL\par \par 10/31/17\par He is doing well. He had a phlebotomyin September and October. No complaints\par \par 1/23/18\par He is here for follow up for hemochromatisis. He has no complaints. He had  CT abd/Pelvis this month that was normal. His Ferretin from this month is in the 60s.\par \par 04/24/2018\par Patient is here for scheduled follow up visit. He denies any complaints. His last ferritin(03/2018) was 81.\par \par 8/14/18\par He is here for follow up.  HIs ferritin from 8/10 was 98. He has no complaints. Last AFP 2.5 in April\par \par 2/12/19\par He is here for follow up.  He had US in 8/2018 by Dr. Aviles IMPRESSION: 1.  Bilateral renal cysts.  2.  Pancreas is mostly obscured by overlying bowel gas and cannot be  assessed.  3.  Otherwise, unremarkable abdominal ultrasound.\par Last Ferritin was 90 in 12/2018.\par \par He is doing well has no complaints. \par \par 8/13/19\par He is here for follow up he had a colonoscopy by Dr. Aviles: 8/3/19  Polyp (5 mm) in the transverse colon. (Polypectomy). \par  Polyp (1 cm) in the transverse colon. (Polypectomy). \par  Polyp (5 mm) in the hepatic flexure. (Polypectomy). \par  Otherwise normal colon. \par Plan: Colonoscopy in 3 years. \par Path was negative. Last ferritin in may was 94.\par \par He has no complaints\par

## 2019-08-14 NOTE — PHYSICAL EXAM
[Fully active, able to carry on all pre-disease performance without restriction] : Status 0 - Fully active, able to carry on all pre-disease performance without restriction [Normal] : grossly intact [de-identified] : He has dry skin on forehead he also has  a small whitish lesion maybe an SCC?

## 2019-08-19 ENCOUNTER — OUTPATIENT (OUTPATIENT)
Dept: OUTPATIENT SERVICES | Facility: HOSPITAL | Age: 54
LOS: 1 days | Discharge: HOME | End: 2019-08-19

## 2019-08-19 DIAGNOSIS — F20.9 SCHIZOPHRENIA, UNSPECIFIED: ICD-10-CM

## 2019-08-19 DIAGNOSIS — Z98.890 OTHER SPECIFIED POSTPROCEDURAL STATES: Chronic | ICD-10-CM

## 2019-08-23 ENCOUNTER — APPOINTMENT (OUTPATIENT)
Dept: INFUSION THERAPY | Facility: CLINIC | Age: 54
End: 2019-08-23

## 2019-08-23 ENCOUNTER — LABORATORY RESULT (OUTPATIENT)
Age: 54
End: 2019-08-23

## 2019-08-26 LAB
HCT VFR BLD CALC: 43.2 %
HGB BLD-MCNC: 15.1 G/DL
MCHC RBC-ENTMCNC: 33.6 PG
MCHC RBC-ENTMCNC: 35 G/DL
MCV RBC AUTO: 96 FL
PLATELET # BLD AUTO: 173 K/UL
PMV BLD: 10.4 FL
RBC # BLD: 4.5 M/UL
RBC # FLD: 11.9 %
WBC # FLD AUTO: 5.49 K/UL

## 2019-09-06 ENCOUNTER — APPOINTMENT (OUTPATIENT)
Dept: INFUSION THERAPY | Facility: CLINIC | Age: 54
End: 2019-09-06

## 2019-09-06 ENCOUNTER — LABORATORY RESULT (OUTPATIENT)
Age: 54
End: 2019-09-06

## 2019-09-06 LAB
HCT VFR BLD CALC: 44 %
HGB BLD-MCNC: 15.3 G/DL
MCHC RBC-ENTMCNC: 33.6 PG
MCHC RBC-ENTMCNC: 34.8 G/DL
MCV RBC AUTO: 96.7 FL
PLATELET # BLD AUTO: 157 K/UL
PMV BLD: 10.7 FL
RBC # BLD: 4.55 M/UL
RBC # FLD: 12.4 %
WBC # FLD AUTO: 4.82 K/UL

## 2019-09-09 ENCOUNTER — OUTPATIENT (OUTPATIENT)
Dept: OUTPATIENT SERVICES | Facility: HOSPITAL | Age: 54
LOS: 1 days | Discharge: HOME | End: 2019-09-09

## 2019-09-09 DIAGNOSIS — Z98.890 OTHER SPECIFIED POSTPROCEDURAL STATES: Chronic | ICD-10-CM

## 2019-09-09 DIAGNOSIS — Z79.899 OTHER LONG TERM (CURRENT) DRUG THERAPY: ICD-10-CM

## 2019-09-20 ENCOUNTER — LABORATORY RESULT (OUTPATIENT)
Age: 54
End: 2019-09-20

## 2019-09-20 ENCOUNTER — APPOINTMENT (OUTPATIENT)
Dept: INFUSION THERAPY | Facility: CLINIC | Age: 54
End: 2019-09-20

## 2019-09-20 ENCOUNTER — OUTPATIENT (OUTPATIENT)
Dept: OUTPATIENT SERVICES | Facility: HOSPITAL | Age: 54
LOS: 1 days | Discharge: HOME | End: 2019-09-20

## 2019-09-20 DIAGNOSIS — E83.110 HEREDITARY HEMOCHROMATOSIS: ICD-10-CM

## 2019-09-20 DIAGNOSIS — Z98.890 OTHER SPECIFIED POSTPROCEDURAL STATES: Chronic | ICD-10-CM

## 2019-09-20 LAB
HCT VFR BLD CALC: 40 %
HGB BLD-MCNC: 14.1 G/DL
MCHC RBC-ENTMCNC: 34.1 PG
MCHC RBC-ENTMCNC: 35.3 G/DL
MCV RBC AUTO: 96.6 FL
PLATELET # BLD AUTO: 182 K/UL
PMV BLD: 10.3 FL
RBC # BLD: 4.14 M/UL
RBC # FLD: 12.5 %
WBC # FLD AUTO: 5.69 K/UL

## 2019-09-23 LAB
FERRITIN SERPL-MCNC: 75 NG/ML
IRON SATN MFR SERPL: 38 %
IRON SERPL-MCNC: 127 UG/DL
TIBC SERPL-MCNC: 332 UG/DL
UIBC SERPL-MCNC: 205 UG/DL

## 2019-09-25 ENCOUNTER — EMERGENCY (EMERGENCY)
Facility: HOSPITAL | Age: 54
LOS: 0 days | Discharge: HOME | End: 2019-09-25
Admitting: EMERGENCY MEDICINE
Payer: MEDICARE

## 2019-09-25 VITALS
DIASTOLIC BLOOD PRESSURE: 74 MMHG | RESPIRATION RATE: 16 BRPM | TEMPERATURE: 96 F | SYSTOLIC BLOOD PRESSURE: 112 MMHG | HEART RATE: 94 BPM
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
## 2019-09-25 DIAGNOSIS — M25.579 PAIN IN UNSPECIFIED ANKLE AND JOINTS OF UNSPECIFIED FOOT: ICD-10-CM
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
## 2019-09-25 DIAGNOSIS — Z98.890 OTHER SPECIFIED POSTPROCEDURAL STATES: Chronic | ICD-10-CM
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
## 2019-09-25 DIAGNOSIS — S82.62XA DISPLACED FRACTURE OF LATERAL MALLEOLUS OF LEFT FIBULA, INITIAL ENCOUNTER FOR CLOSED FRACTURE: ICD-10-CM

## 2019-09-25 DIAGNOSIS — S82.52XA DISPLACED FRACTURE OF MEDIAL MALLEOLUS OF LEFT TIBIA, INITIAL ENCOUNTER FOR CLOSED FRACTURE: ICD-10-CM

## 2019-09-25 PROCEDURE — 99284 EMERGENCY DEPT VISIT MOD MDM: CPT | Mod: 25

## 2019-09-25 PROCEDURE — 73630 X-RAY EXAM OF FOOT: CPT | Mod: 26,LT

## 2019-09-25 PROCEDURE — 73610 X-RAY EXAM OF ANKLE: CPT | Mod: 26,LT

## 2019-09-25 PROCEDURE — 29515 APPLICATION SHORT LEG SPLINT: CPT

## 2019-09-25 NOTE — ED PROVIDER NOTE - CARE PROVIDER_API CALL
Donald Funez (MD)  Orthopaedic Surgery  [address] [place] [address]  Phone: [phone]  Fax: [phone]  Follow Up Time:

## 2019-09-25 NOTE — ED PROVIDER NOTE - OBJECTIVE STATEMENT
54 year old male with pmhx of seizures, and HTN, presents with left ankle injury x 1 day ago. pt was walking in house, tripped and fell, with inversion to left ankle. pt complaining of left ankle pain and swelling, worse with ambulation. no head injury.

## 2019-09-25 NOTE — ED ADULT NURSE NOTE - NSIMPLEMENTINTERV_GEN_ALL_ED
Implemented All Universal Safety Interventions:  [place] to call system. Call bell, personal items and telephone within reach. Instruct patient to call for assistance. Room bathroom lighting operational. Non-slip footwear when patient is off stretcher. Physically safe environment: no spills, clutter or unnecessary equipment. Stretcher in lowest position, wheels locked, appropriate side rails in place.

## 2019-09-25 NOTE — ED PROVIDER NOTE - PATIENT PORTAL LINK FT
You can access the FollowMyHealth Patient Portal offered by [facility] by registering at the following website: [email or web address]. By joining [employer]s FollowMyHealth portal, you will also be able to view your health information using other applications (apps) compatible with our system.

## 2019-09-25 NOTE — ED PROVIDER NOTE - PHYSICAL EXAMINATION
CONST: Well appearing in NAD  NECK: Non-tender, no meningeal signs. normal ROM. supple   CARD: Normal S1 S2; Normal rate and rhythm  RESP: Equal BS B/L, No wheezes, rhonchi or rales. No distress  MS: + tenderness and swelling to left lateral and medial malleoli of ankle, Normal ROM in all extremities. No midline spinal tenderness. pulses 2 +. no calf tenderness or swelling  SKIN: + ecchymosis to left ankle, lateral and medial malleoli, extending to base of left 5th metatarsal  NEURO: A&Ox3, No focal deficits. Strength 5/5 with no sensory deficits. Steady gait.

## 2019-09-25 NOTE — ED PROVIDER NOTE - NSFOLLOWUPCLINICS_GEN_ALL_ED_FT
[facility]  Orthpedic  [address], [place]   Phone: [phone]  Fax:   Follow Up Time:

## 2019-09-25 NOTE — ED PROVIDER NOTE - NS ED ROS FT
Review of Systems:  	•	CONSTITUTIONAL - no fever, no diaphoresis, no chills  	•	SKIN - no rash  	•	HEMATOLOGIC - no bleeding, no bruising  	•	MUSCULOSKELETAL - + left ankle pain  	•	NEUROLOGIC - no weakness, no paresthesias

## 2019-10-02 ENCOUNTER — OUTPATIENT (OUTPATIENT)
Dept: OUTPATIENT SERVICES | Facility: HOSPITAL | Age: 54
LOS: 1 days | Discharge: HOME | End: 2019-10-02
Payer: MEDICARE

## 2019-10-02 ENCOUNTER — INBOUND DOCUMENT (OUTPATIENT)
Age: 54
End: 2019-10-02

## 2019-10-02 ENCOUNTER — APPOINTMENT (OUTPATIENT)
Dept: ORTHOPEDIC SURGERY | Facility: CLINIC | Age: 54
End: 2019-10-02

## 2019-10-02 DIAGNOSIS — Z98.890 OTHER SPECIFIED POSTPROCEDURAL STATES: Chronic | ICD-10-CM

## 2019-10-02 PROCEDURE — 73610 X-RAY EXAM OF ANKLE: CPT | Mod: 26,LT

## 2019-10-08 ENCOUNTER — OUTPATIENT (OUTPATIENT)
Dept: OUTPATIENT SERVICES | Facility: HOSPITAL | Age: 54
LOS: 1 days | Discharge: HOME | End: 2019-10-08

## 2019-10-08 DIAGNOSIS — Z98.890 OTHER SPECIFIED POSTPROCEDURAL STATES: Chronic | ICD-10-CM

## 2019-10-08 DIAGNOSIS — F20.9 SCHIZOPHRENIA, UNSPECIFIED: ICD-10-CM

## 2019-11-04 ENCOUNTER — OUTPATIENT (OUTPATIENT)
Dept: OUTPATIENT SERVICES | Facility: HOSPITAL | Age: 54
LOS: 1 days | Discharge: HOME | End: 2019-11-04

## 2019-11-04 DIAGNOSIS — F20.9 SCHIZOPHRENIA, UNSPECIFIED: ICD-10-CM

## 2019-11-04 DIAGNOSIS — Z98.890 OTHER SPECIFIED POSTPROCEDURAL STATES: Chronic | ICD-10-CM

## 2019-11-11 ENCOUNTER — APPOINTMENT (OUTPATIENT)
Dept: HEMATOLOGY ONCOLOGY | Facility: CLINIC | Age: 54
End: 2019-11-11

## 2019-11-13 ENCOUNTER — OUTPATIENT (OUTPATIENT)
Dept: OUTPATIENT SERVICES | Facility: HOSPITAL | Age: 54
LOS: 1 days | Discharge: HOME | End: 2019-11-13
Payer: COMMERCIAL

## 2019-11-13 ENCOUNTER — APPOINTMENT (OUTPATIENT)
Dept: ORTHOPEDIC SURGERY | Facility: CLINIC | Age: 54
End: 2019-11-13

## 2019-11-13 DIAGNOSIS — Z98.890 OTHER SPECIFIED POSTPROCEDURAL STATES: Chronic | ICD-10-CM

## 2019-11-13 DIAGNOSIS — S82.401A UNSPECIFIED FRACTURE OF SHAFT OF RIGHT FIBULA, INITIAL ENCOUNTER FOR CLOSED FRACTURE: ICD-10-CM

## 2019-11-13 PROCEDURE — 73610 X-RAY EXAM OF ANKLE: CPT | Mod: 26,LT

## 2019-11-18 ENCOUNTER — RX CHANGE (OUTPATIENT)
Age: 54
End: 2019-11-18

## 2019-11-26 ENCOUNTER — APPOINTMENT (OUTPATIENT)
Dept: GASTROENTEROLOGY | Facility: CLINIC | Age: 54
End: 2019-11-26
Payer: COMMERCIAL

## 2019-11-26 VITALS
HEIGHT: 69 IN | DIASTOLIC BLOOD PRESSURE: 80 MMHG | WEIGHT: 160 LBS | BODY MASS INDEX: 23.7 KG/M2 | SYSTOLIC BLOOD PRESSURE: 118 MMHG | HEART RATE: 90 BPM

## 2019-11-26 DIAGNOSIS — Z86.010 OTHER SPECIFIED POSTPROCEDURAL STATES: ICD-10-CM

## 2019-11-26 DIAGNOSIS — Z86.39 PERSONAL HISTORY OF OTHER ENDOCRINE, NUTRITIONAL AND METABOLIC DISEASE: ICD-10-CM

## 2019-11-26 DIAGNOSIS — Z86.79 PERSONAL HISTORY OF OTHER DISEASES OF THE CIRCULATORY SYSTEM: ICD-10-CM

## 2019-11-26 DIAGNOSIS — Z98.890 OTHER SPECIFIED POSTPROCEDURAL STATES: ICD-10-CM

## 2019-11-26 PROCEDURE — 99213 OFFICE O/P EST LOW 20 MIN: CPT

## 2019-11-26 NOTE — HISTORY OF PRESENT ILLNESS
[Nausea] : denies nausea [Heartburn] : denies heartburn [Vomiting] : denies vomiting [Constipation] : denies constipation [Diarrhea] : denies diarrhea [Yellow Skin Or Eyes (Jaundice)] : denies jaundice [Abdominal Pain] : denies abdominal pain [Abdominal Swelling] : denies abdominal swelling [Rectal Pain] : denies rectal pain [de-identified] : This is a 54 year old male who presents for followup after colonoscopy.

## 2019-11-26 NOTE — PHYSICAL EXAM
[General Appearance - In No Acute Distress] : in no acute distress [General Appearance - Alert] : alert [Sclera] : the sclera and conjunctiva were normal [General Appearance - Well Nourished] : well nourished [General Appearance - Well Developed] : well developed [Outer Ear] : the ears and nose were normal in appearance [Extraocular Movements] : extraocular movements were intact [Oropharynx] : the oropharynx was normal [Neck Appearance] : the appearance of the neck was normal [Jugular Venous Distention Increased] : there was no jugular-venous distention [Neck Cervical Mass (___cm)] : no neck mass was observed [Thyroid Diffuse Enlargement] : the thyroid was not enlarged [Thyroid Nodule] : there were no palpable thyroid nodules [Auscultation Breath Sounds / Voice Sounds] : lungs were clear to auscultation bilaterally [Respiration, Rhythm And Depth] : normal respiratory rhythm and effort [Heart Rate And Rhythm] : heart rate was normal and rhythm regular [Apical Impulse] : the apical impulse was normal [Murmurs] : no murmurs [Heart Sounds Gallop] : no gallops [Heart Sounds] : normal S1 and S2 [Bowel Sounds] : normal bowel sounds [Abdomen Soft] : soft [Heart Sounds Pericardial Friction Rub] : no pericardial rub [Abdomen Tenderness] : non-tender [] : no hepato-splenomegaly [Abdomen Mass (___ Cm)] : no abdominal mass palpated [Oriented To Time, Place, And Person] : oriented to person, place, and time [Affect] : the affect was normal [Impaired Insight] : insight and judgment were intact

## 2019-12-02 ENCOUNTER — OUTPATIENT (OUTPATIENT)
Dept: OUTPATIENT SERVICES | Facility: HOSPITAL | Age: 54
LOS: 1 days | Discharge: HOME | End: 2019-12-02

## 2019-12-02 DIAGNOSIS — F20.9 SCHIZOPHRENIA, UNSPECIFIED: ICD-10-CM

## 2019-12-02 DIAGNOSIS — Z98.890 OTHER SPECIFIED POSTPROCEDURAL STATES: Chronic | ICD-10-CM

## 2019-12-05 ENCOUNTER — FORM ENCOUNTER (OUTPATIENT)
Age: 54
End: 2019-12-05

## 2019-12-06 ENCOUNTER — OUTPATIENT (OUTPATIENT)
Dept: OUTPATIENT SERVICES | Facility: HOSPITAL | Age: 54
LOS: 1 days | Discharge: HOME | End: 2019-12-06
Payer: COMMERCIAL

## 2019-12-06 DIAGNOSIS — E83.110 HEREDITARY HEMOCHROMATOSIS: ICD-10-CM

## 2019-12-06 DIAGNOSIS — Z98.890 OTHER SPECIFIED POSTPROCEDURAL STATES: Chronic | ICD-10-CM

## 2019-12-06 DIAGNOSIS — R10.11 RIGHT UPPER QUADRANT PAIN: ICD-10-CM

## 2019-12-06 PROCEDURE — 76705 ECHO EXAM OF ABDOMEN: CPT | Mod: 26

## 2019-12-11 ENCOUNTER — APPOINTMENT (OUTPATIENT)
Dept: HEMATOLOGY ONCOLOGY | Facility: CLINIC | Age: 54
End: 2019-12-11

## 2019-12-16 LAB — FERRITIN SERPL-MCNC: 160 NG/ML

## 2019-12-27 ENCOUNTER — LABORATORY RESULT (OUTPATIENT)
Age: 54
End: 2019-12-27

## 2019-12-27 ENCOUNTER — APPOINTMENT (OUTPATIENT)
Dept: INFUSION THERAPY | Facility: CLINIC | Age: 54
End: 2019-12-27

## 2019-12-30 ENCOUNTER — OUTPATIENT (OUTPATIENT)
Dept: OUTPATIENT SERVICES | Facility: HOSPITAL | Age: 54
LOS: 1 days | Discharge: HOME | End: 2019-12-30

## 2019-12-30 DIAGNOSIS — F20.9 SCHIZOPHRENIA, UNSPECIFIED: ICD-10-CM

## 2019-12-30 DIAGNOSIS — Z98.890 OTHER SPECIFIED POSTPROCEDURAL STATES: Chronic | ICD-10-CM

## 2019-12-30 LAB
HCT VFR BLD CALC: 43.7 %
HGB BLD-MCNC: 15.2 G/DL
MCHC RBC-ENTMCNC: 32.6 PG
MCHC RBC-ENTMCNC: 34.8 G/DL
MCV RBC AUTO: 93.8 FL
PLATELET # BLD AUTO: 164 K/UL
PMV BLD: 10.2 FL
RBC # BLD: 4.66 M/UL
RBC # FLD: 12.5 %
WBC # FLD AUTO: 5.1 K/UL

## 2020-01-17 ENCOUNTER — LABORATORY RESULT (OUTPATIENT)
Age: 55
End: 2020-01-17

## 2020-01-17 ENCOUNTER — APPOINTMENT (OUTPATIENT)
Dept: INFUSION THERAPY | Facility: CLINIC | Age: 55
End: 2020-01-17

## 2020-01-17 LAB
HCT VFR BLD CALC: 44.7 %
HGB BLD-MCNC: 15.3 G/DL
MCHC RBC-ENTMCNC: 32.6 PG
MCHC RBC-ENTMCNC: 34.2 G/DL
MCV RBC AUTO: 95.1 FL
PLATELET # BLD AUTO: 173 K/UL
PMV BLD: 10.2 FL
RBC # BLD: 4.7 M/UL
RBC # FLD: 12.9 %
WBC # FLD AUTO: 4.77 K/UL

## 2020-01-20 LAB — FERRITIN SERPL-MCNC: 109 NG/ML

## 2020-02-04 ENCOUNTER — OUTPATIENT (OUTPATIENT)
Dept: OUTPATIENT SERVICES | Facility: HOSPITAL | Age: 55
LOS: 1 days | Discharge: HOME | End: 2020-02-04

## 2020-02-04 ENCOUNTER — APPOINTMENT (OUTPATIENT)
Dept: INTERNAL MEDICINE | Facility: CLINIC | Age: 55
End: 2020-02-04
Payer: COMMERCIAL

## 2020-02-04 VITALS
BODY MASS INDEX: 24.59 KG/M2 | SYSTOLIC BLOOD PRESSURE: 119 MMHG | HEART RATE: 93 BPM | DIASTOLIC BLOOD PRESSURE: 77 MMHG | WEIGHT: 166 LBS | HEIGHT: 69 IN

## 2020-02-04 DIAGNOSIS — Z98.890 OTHER SPECIFIED POSTPROCEDURAL STATES: Chronic | ICD-10-CM

## 2020-02-04 PROCEDURE — 99213 OFFICE O/P EST LOW 20 MIN: CPT | Mod: GC

## 2020-02-04 RX ORDER — LORATADINE 5 MG
17 TABLET,CHEWABLE ORAL DAILY
Qty: 1 | Refills: 0 | Status: DISCONTINUED | COMMUNITY
Start: 2019-07-19 | End: 2020-02-04

## 2020-02-04 RX ORDER — POLYETHYLENE GLYCOL 3350, SODIUM SULFATE ANHYDROUS, SODIUM BICARBONATE, SODIUM CHLORIDE, POTASSIUM CHLORIDE 227.1; 21.5; 6.36; 5.53; .754 G/L; G/L; G/L; G/L; G/L
227.1 POWDER, FOR SOLUTION ORAL
Qty: 1 | Refills: 0 | Status: DISCONTINUED | COMMUNITY
Start: 2019-07-19 | End: 2020-02-04

## 2020-02-05 ENCOUNTER — FORM ENCOUNTER (OUTPATIENT)
Age: 55
End: 2020-02-05

## 2020-02-06 ENCOUNTER — OUTPATIENT (OUTPATIENT)
Dept: OUTPATIENT SERVICES | Facility: HOSPITAL | Age: 55
LOS: 1 days | Discharge: HOME | End: 2020-02-06

## 2020-02-06 ENCOUNTER — OUTPATIENT (OUTPATIENT)
Dept: OUTPATIENT SERVICES | Facility: HOSPITAL | Age: 55
LOS: 1 days | Discharge: HOME | End: 2020-02-06
Payer: COMMERCIAL

## 2020-02-06 DIAGNOSIS — Z98.890 OTHER SPECIFIED POSTPROCEDURAL STATES: Chronic | ICD-10-CM

## 2020-02-06 DIAGNOSIS — Z00.00 ENCOUNTER FOR GENERAL ADULT MEDICAL EXAMINATION WITHOUT ABNORMAL FINDINGS: ICD-10-CM

## 2020-02-06 DIAGNOSIS — R05 COUGH: ICD-10-CM

## 2020-02-06 LAB
ALBUMIN SERPL ELPH-MCNC: 4.5 G/DL
ALP BLD-CCNC: 62 U/L
ALT SERPL-CCNC: 25 U/L
ANION GAP SERPL CALC-SCNC: 13 MMOL/L
AST SERPL-CCNC: 23 U/L
BILIRUB SERPL-MCNC: 0.5 MG/DL
BUN SERPL-MCNC: 18 MG/DL
CALCIUM SERPL-MCNC: 9.4 MG/DL
CHLORIDE SERPL-SCNC: 102 MMOL/L
CHOLEST SERPL-MCNC: 211 MG/DL
CHOLEST/HDLC SERPL: 2.5 RATIO
CO2 SERPL-SCNC: 28 MMOL/L
CREAT SERPL-MCNC: 1 MG/DL
ESTIMATED AVERAGE GLUCOSE: 103 MG/DL
GLUCOSE SERPL-MCNC: 112 MG/DL
HBA1C MFR BLD HPLC: 5.2 %
HDLC SERPL-MCNC: 86 MG/DL
LDLC SERPL CALC-MCNC: 123 MG/DL
POTASSIUM SERPL-SCNC: 4.2 MMOL/L
PROT SERPL-MCNC: 6.5 G/DL
SODIUM SERPL-SCNC: 143 MMOL/L
TRIGL SERPL-MCNC: 116 MG/DL
TSH SERPL-ACNC: 1.98 UIU/ML

## 2020-02-06 PROCEDURE — 71046 X-RAY EXAM CHEST 2 VIEWS: CPT | Mod: 26

## 2020-02-06 PROCEDURE — 93010 ELECTROCARDIOGRAM REPORT: CPT

## 2020-02-07 ENCOUNTER — RESULT CHARGE (OUTPATIENT)
Age: 55
End: 2020-02-07

## 2020-02-07 LAB
APPEARANCE: CLEAR
BILIRUBIN URINE: NEGATIVE
BLOOD URINE: NEGATIVE
COLOR: NORMAL
GLUCOSE QUALITATIVE U: NEGATIVE
HBV SURFACE AB SERPL IA-ACNC: <3 MIU/ML
HIV1+2 AB SPEC QL IA.RAPID: NONREACTIVE
KETONES URINE: NEGATIVE
LEUKOCYTE ESTERASE URINE: NEGATIVE
NITRITE URINE: NEGATIVE
PH URINE: 6.5
PROTEIN URINE: NEGATIVE
SPECIFIC GRAVITY URINE: 1.02
UROBILINOGEN URINE: NORMAL

## 2020-02-10 ENCOUNTER — APPOINTMENT (OUTPATIENT)
Dept: INFUSION THERAPY | Facility: CLINIC | Age: 55
End: 2020-02-10
Payer: COMMERCIAL

## 2020-02-10 ENCOUNTER — APPOINTMENT (OUTPATIENT)
Dept: HEMATOLOGY ONCOLOGY | Facility: CLINIC | Age: 55
End: 2020-02-10
Payer: COMMERCIAL

## 2020-02-10 ENCOUNTER — LABORATORY RESULT (OUTPATIENT)
Age: 55
End: 2020-02-10

## 2020-02-10 VITALS
HEART RATE: 87 BPM | HEIGHT: 69 IN | BODY MASS INDEX: 25.03 KG/M2 | SYSTOLIC BLOOD PRESSURE: 129 MMHG | RESPIRATION RATE: 16 BRPM | DIASTOLIC BLOOD PRESSURE: 82 MMHG | TEMPERATURE: 95.8 F | WEIGHT: 169 LBS

## 2020-02-10 DIAGNOSIS — Z00.00 ENCOUNTER FOR GENERAL ADULT MEDICAL EXAMINATION WITHOUT ABNORMAL FINDINGS: ICD-10-CM

## 2020-02-10 DIAGNOSIS — F20.9 SCHIZOPHRENIA, UNSPECIFIED: ICD-10-CM

## 2020-02-10 DIAGNOSIS — Z12.11 ENCOUNTER FOR SCREENING FOR MALIGNANT NEOPLASM OF COLON: ICD-10-CM

## 2020-02-10 DIAGNOSIS — E83.110 HEREDITARY HEMOCHROMATOSIS: ICD-10-CM

## 2020-02-10 DIAGNOSIS — I10 ESSENTIAL (PRIMARY) HYPERTENSION: ICD-10-CM

## 2020-02-10 DIAGNOSIS — M10.9 GOUT, UNSPECIFIED: ICD-10-CM

## 2020-02-10 DIAGNOSIS — E78.5 HYPERLIPIDEMIA, UNSPECIFIED: ICD-10-CM

## 2020-02-10 DIAGNOSIS — R05 COUGH: ICD-10-CM

## 2020-02-10 DIAGNOSIS — K63.5 POLYP OF COLON: ICD-10-CM

## 2020-02-10 DIAGNOSIS — Z02.9 ENCOUNTER FOR ADMINISTRATIVE EXAMINATIONS, UNSPECIFIED: ICD-10-CM

## 2020-02-10 LAB
HCT VFR BLD CALC: 41.9 %
HGB BLD-MCNC: 14.5 G/DL
IRON SATN MFR SERPL: 47 %
IRON SERPL-MCNC: 149 UG/DL
M TB IFN-G BLD-IMP: NEGATIVE
MCHC RBC-ENTMCNC: 33.1 PG
MCHC RBC-ENTMCNC: 34.6 G/DL
MCV RBC AUTO: 95.7 FL
MEV IGG FLD QL IA: 9.6 AU/ML
MEV IGG+IGM SER-IMP: NEGATIVE
MUV AB SER-ACNC: POSITIVE
MUV IGG SER QL IA: 141 AU/ML
PLATELET # BLD AUTO: 158 K/UL
PMV BLD: 10.7 FL
QUANTIFERON TB PLUS MITOGEN MINUS NIL: 5.3 IU/ML
QUANTIFERON TB PLUS NIL: 0.01 IU/ML
QUANTIFERON TB PLUS TB1 MINUS NIL: 0 IU/ML
QUANTIFERON TB PLUS TB2 MINUS NIL: 0 IU/ML
RBC # BLD: 4.38 M/UL
RBC # FLD: 12.8 %
RUBV IGG FLD-ACNC: 6.3 INDEX
RUBV IGG SER-IMP: POSITIVE
TIBC SERPL-MCNC: 317 UG/DL
UIBC SERPL-MCNC: 168 UG/DL
VZV AB TITR SER: POSITIVE
VZV IGG SER IF-ACNC: >4000 INDEX
WBC # FLD AUTO: 4.86 K/UL

## 2020-02-10 PROCEDURE — 99213 OFFICE O/P EST LOW 20 MIN: CPT

## 2020-02-10 NOTE — HISTORY OF PRESENT ILLNESS
[FreeTextEntry1] : f/u visit  [de-identified] : 53 M with past medical history of DLD, Gout, Hemochromatosis, HTN, Schizophrenia and seizure disorder. He is here for a follow up visit. Pt has no active complaints.

## 2020-02-10 NOTE — ASSESSMENT
[FreeTextEntry1] : 54 M with past medical history of colonic polyps,  DLD, Gout, Hemochromatosis, HTN, Schizophrenia and seizure disorder. He is here for a follow up visit and a physical exam\par \par # Gout \par - Stable on allopurinol \par \par # Hemochromatosis \par - Stable; ferritin- 109; goal ; repeat every 3 months, last phlebotomy in december 2019\par - Continue to follow heme/onc \par \par # Schizophrenia \par - Patient is on clozapine \par - Continue following Psychiatry on out patient \par \par # Seizure disorder\par - Stable on Depakote \par \par # HTN\par - Stable- well controlled on Ramipril \par \par # Colonic polyps\par - tubular adenoma transverse colon\par - f/u colonscopy in 3 years\par - following with GI \par \par # Health care maintenance \par - flu vaccine obtain nov 2019 per pt \par - colonoscopy done aug 2019, rpt in 3 years \par - rpt labs \par - rtc 6 mos

## 2020-02-11 LAB — FERRITIN SERPL-MCNC: 98 NG/ML

## 2020-02-20 NOTE — ASSESSMENT
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  The patient has been getting phlebotomies as needed.\par - If the ferritin is not a goal, ferritin of 50 to 100; we will arrange for more phlebotomy\par - will  consider stopping US abdomen  every 6 months since he has no evidence of cirrhosis ; last done 12/2019 [facility]\par - CBC, iron studies, ferritin today\par \par #Questionable SCC on his forehead\par -follows with derm\par \par The patient also knows not to take iron supplements as well as not to drink any alcoholic beverages.\par \par RTC in 3 months with CBC and ferritin

## 2020-02-20 NOTE — HISTORY OF PRESENT ILLNESS
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  He also had a liver biopsy, consistent with iron overload.  He has been getting intermittent phlebotomies.  He has not had phlebotomy now in several months.  His most recent ferritin was from 08/16/2016; it demonstrated a ferritin of 58.  He now presents for followup evaluation.  He states he is doing quite well.  He has no complaints.\par \par HCM\par Next Colonoscopy due in Nov 2022 [de-identified] : 1/23/17\par He presents for follow up. His ferritin in Oct was 101 and he underwent a phlebotomy of 1 unit. HIs  AFP was 7 and ultrasound of the liver was normal in November. He comes in for follow up. Hs no complaints.\par \par 4/19/17\par He presents for follow up. Last ferritin  was under 100.  He has no complaints,\par \par 7/18/17\par No events. Denies weight loss, fevers. No new meds. Last AFP an ferritin were WNL\par \par 10/31/17\par He is doing well. He had a phlebotomyin September and October. No complaints\par \par 1/23/18\par He is here for follow up for hemochromatisis. He has no complaints. He had  CT abd/Pelvis this month that was normal. His Ferretin from this month is in the 60s.\par \par 04/24/2018\par Patient is here for scheduled follow up visit. He denies any complaints. His last ferritin(03/2018) was 81.\par \par 8/14/18\par He is here for follow up.  HIs ferritin from 8/10 was 98. He has no complaints. Last AFP 2.5 in April\par \par 2/12/19\par He is here for follow up.  He had US in 8/2018 by Dr. Aviles IMPRESSION: 1.  Bilateral renal cysts.  2.  Pancreas is mostly obscured by overlying bowel gas and cannot be  assessed.  3.  Otherwise, unremarkable abdominal ultrasound.\par Last Ferritin was 90 in 12/2018.\par \par He is doing well has no complaints. \par \par 8/13/19\par He is here for follow up he had a colonoscopy by Dr. Aviles: 8/3/19  Polyp (5 mm) in the transverse colon. (Polypectomy). \par  Polyp (1 cm) in the transverse colon. (Polypectomy). \par  Polyp (5 mm) in the hepatic flexure. (Polypectomy). \par  Otherwise normal colon. \par Plan: Colonoscopy in 3 years. \par Path was negative. Last ferritin in may was 94.\par \par He has no complaints\par \par 2/10/2020\par Patient is here for a follow-up visit for hereditary hemochromatosis.  He is feeling well with no new complaints.  Most recent CBC is stable.  Ferritin from 1.17.2020 down to 109.  He was phlebotomized that same day.    \par US Abd (12.6.2019) IMPRESSION:Unremarkable right upper quadrant ultrasound.

## 2020-02-20 NOTE — PHYSICAL EXAM
[Fully active, able to carry on all pre-disease performance without restriction] : Status 0 - Fully active, able to carry on all pre-disease performance without restriction [Normal] : affect appropriate [de-identified] : He has dry skin on forehead he also has  a small whitish lesion maybe an SCC?

## 2020-02-20 NOTE — REVIEW OF SYSTEMS
[Negative] : Heme/Lymph [Fever] : no fever [Chills] : no chills [Chest Pain] : no chest pain [Shortness Of Breath] : no shortness of breath

## 2020-05-11 ENCOUNTER — APPOINTMENT (OUTPATIENT)
Dept: HEMATOLOGY ONCOLOGY | Facility: CLINIC | Age: 55
End: 2020-05-11
Payer: COMMERCIAL

## 2020-05-11 ENCOUNTER — LABORATORY RESULT (OUTPATIENT)
Age: 55
End: 2020-05-11

## 2020-05-11 VITALS
BODY MASS INDEX: 24.59 KG/M2 | WEIGHT: 166 LBS | TEMPERATURE: 97.6 F | HEIGHT: 69 IN | DIASTOLIC BLOOD PRESSURE: 85 MMHG | HEART RATE: 81 BPM | RESPIRATION RATE: 16 BRPM | SYSTOLIC BLOOD PRESSURE: 126 MMHG

## 2020-05-11 LAB
HCT VFR BLD CALC: 42.8 %
HGB BLD-MCNC: 14.6 G/DL
MCHC RBC-ENTMCNC: 33.2 PG
MCHC RBC-ENTMCNC: 34.1 G/DL
MCV RBC AUTO: 97.3 FL
PLATELET # BLD AUTO: 145 K/UL
PMV BLD: 10 FL
RBC # BLD: 4.4 M/UL
RBC # FLD: 12.1 %
WBC # FLD AUTO: 5.11 K/UL

## 2020-05-11 PROCEDURE — 99213 OFFICE O/P EST LOW 20 MIN: CPT

## 2020-05-11 NOTE — HISTORY OF PRESENT ILLNESS
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  He also had a liver biopsy, consistent with iron overload.  He has been getting intermittent phlebotomies.  He has not had phlebotomy now in several months.  His most recent ferritin was from 08/16/2016; it demonstrated a ferritin of 58.  He now presents for followup evaluation.  He states he is doing quite well.  He has no complaints.\par \par HCM\par Next Colonoscopy due in Nov 2022 [de-identified] : 1/23/17\par He presents for follow up. His ferritin in Oct was 101 and he underwent a phlebotomy of 1 unit. HIs  AFP was 7 and ultrasound of the liver was normal in November. He comes in for follow up. Hs no complaints.\par \par 4/19/17\par He presents for follow up. Last ferritin  was under 100.  He has no complaints,\par \par 7/18/17\par No events. Denies weight loss, fevers. No new meds. Last AFP an ferritin were WNL\par \par 10/31/17\par He is doing well. He had a phlebotomyin September and October. No complaints\par \par 1/23/18\par He is here for follow up for hemochromatisis. He has no complaints. He had  CT abd/Pelvis this month that was normal. His Ferretin from this month is in the 60s.\par \par 04/24/2018\par Patient is here for scheduled follow up visit. He denies any complaints. His last ferritin(03/2018) was 81.\par \par 8/14/18\par He is here for follow up.  HIs ferritin from 8/10 was 98. He has no complaints. Last AFP 2.5 in April\par \par 2/12/19\par He is here for follow up.  He had US in 8/2018 by Dr. Aviles IMPRESSION: 1.  Bilateral renal cysts.  2.  Pancreas is mostly obscured by overlying bowel gas and cannot be  assessed.  3.  Otherwise, unremarkable abdominal ultrasound.\par Last Ferritin was 90 in 12/2018.\par \par He is doing well has no complaints. \par \par 8/13/19\par He is here for follow up he had a colonoscopy by Dr. Aviles: 8/3/19  Polyp (5 mm) in the transverse colon. (Polypectomy). \par  Polyp (1 cm) in the transverse colon. (Polypectomy). \par  Polyp (5 mm) in the hepatic flexure. (Polypectomy). \par  Otherwise normal colon. \par Plan: Colonoscopy in 3 years. \par Path was negative. Last ferritin in may was 94.\par \par He has no complaints\par \par 2/10/2020\par Patient is here for a follow-up visit for hereditary hemochromatosis.  He is feeling well with no new complaints.  Most recent CBC is stable.  Ferritin from 1.17.2020 down to 109.  He was phlebotomized that same day.    \par US Abd (12.6.2019) IMPRESSION:Unremarkable right upper quadrant ultrasound.\par \par 5/11/2020\par He is doing well he has no compalitns. \par

## 2020-05-11 NOTE — ASSESSMENT
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  The patient has been getting phlebotomies as needed.\par - If the ferritin is not a goal, ferritin of 50 to 100; we will arrange for more phlebotomy\par - will  stop  US abdomen  every 6 months since he has no evidence of cirrhosis ; last done 12/2019 [facility]\par - CBC, iron studies, ferritin today\par \par #Questionable SCC on his forehead\par -follows with derm\par \par The patient also knows not to take iron supplements as well as not to drink any alcoholic beverages.\par \par RTC in 4-6  months will call with iron results and arrange for phlebotomy as needed.

## 2020-05-11 NOTE — REVIEW OF SYSTEMS
[Negative] : Heme/Lymph [Fever] : no fever [Chest Pain] : no chest pain [Chills] : no chills [Shortness Of Breath] : no shortness of breath

## 2020-05-12 LAB
FERRITIN SERPL-MCNC: 144 NG/ML
IRON SATN MFR SERPL: 58 %
IRON SERPL-MCNC: 174 UG/DL
TIBC SERPL-MCNC: 299 UG/DL
UIBC SERPL-MCNC: 125 UG/DL

## 2020-05-14 ENCOUNTER — OUTPATIENT (OUTPATIENT)
Dept: OUTPATIENT SERVICES | Facility: HOSPITAL | Age: 55
LOS: 1 days | Discharge: HOME | End: 2020-05-14

## 2020-05-14 ENCOUNTER — APPOINTMENT (OUTPATIENT)
Dept: INFUSION THERAPY | Facility: CLINIC | Age: 55
End: 2020-05-14

## 2020-05-14 DIAGNOSIS — Z98.890 OTHER SPECIFIED POSTPROCEDURAL STATES: Chronic | ICD-10-CM

## 2020-05-14 DIAGNOSIS — E83.110 HEREDITARY HEMOCHROMATOSIS: ICD-10-CM

## 2020-06-01 ENCOUNTER — OUTPATIENT (OUTPATIENT)
Dept: OUTPATIENT SERVICES | Facility: HOSPITAL | Age: 55
LOS: 1 days | Discharge: HOME | End: 2020-06-01
Payer: COMMERCIAL

## 2020-06-01 DIAGNOSIS — Z98.890 OTHER SPECIFIED POSTPROCEDURAL STATES: Chronic | ICD-10-CM

## 2020-06-01 PROCEDURE — 92250 FUNDUS PHOTOGRAPHY W/I&R: CPT | Mod: 26

## 2020-06-01 PROCEDURE — 92004 COMPRE OPH EXAM NEW PT 1/>: CPT

## 2020-06-03 DIAGNOSIS — H04.123 DRY EYE SYNDROME OF BILATERAL LACRIMAL GLANDS: ICD-10-CM

## 2020-06-03 DIAGNOSIS — H52.4 PRESBYOPIA: ICD-10-CM

## 2020-06-03 DIAGNOSIS — I10 ESSENTIAL (PRIMARY) HYPERTENSION: ICD-10-CM

## 2020-06-03 DIAGNOSIS — H40.003 PREGLAUCOMA, UNSPECIFIED, BILATERAL: ICD-10-CM

## 2020-06-03 DIAGNOSIS — H02.88A MEIBOMIAN GLAND DYSFUNCTION RIGHT EYE, UPPER AND LOWER EYELIDS: ICD-10-CM

## 2020-06-03 DIAGNOSIS — H35.343 MACULAR CYST, HOLE, OR PSEUDOHOLE, BILATERAL: ICD-10-CM

## 2020-06-23 ENCOUNTER — OUTPATIENT (OUTPATIENT)
Dept: OUTPATIENT SERVICES | Facility: HOSPITAL | Age: 55
LOS: 1 days | Discharge: HOME | End: 2020-06-23
Payer: COMMERCIAL

## 2020-06-23 DIAGNOSIS — Z98.890 OTHER SPECIFIED POSTPROCEDURAL STATES: Chronic | ICD-10-CM

## 2020-06-23 PROCEDURE — 92012 INTRM OPH EXAM EST PATIENT: CPT

## 2020-06-23 PROCEDURE — 92202 OPSCPY EXTND ON/MAC DRAW: CPT

## 2020-06-23 PROCEDURE — 92134 CPTRZ OPH DX IMG PST SGM RTA: CPT | Mod: 26

## 2020-06-24 DIAGNOSIS — H40.003 PREGLAUCOMA, UNSPECIFIED, BILATERAL: ICD-10-CM

## 2020-06-24 DIAGNOSIS — H04.123 DRY EYE SYNDROME OF BILATERAL LACRIMAL GLANDS: ICD-10-CM

## 2020-06-24 DIAGNOSIS — H02.88A MEIBOMIAN GLAND DYSFUNCTION RIGHT EYE, UPPER AND LOWER EYELIDS: ICD-10-CM

## 2020-06-24 DIAGNOSIS — H35.343 MACULAR CYST, HOLE, OR PSEUDOHOLE, BILATERAL: ICD-10-CM

## 2020-07-06 ENCOUNTER — OUTPATIENT (OUTPATIENT)
Dept: OUTPATIENT SERVICES | Facility: HOSPITAL | Age: 55
LOS: 1 days | Discharge: HOME | End: 2020-07-06

## 2020-07-06 ENCOUNTER — LABORATORY RESULT (OUTPATIENT)
Age: 55
End: 2020-07-06

## 2020-07-06 ENCOUNTER — APPOINTMENT (OUTPATIENT)
Dept: HEMATOLOGY ONCOLOGY | Facility: CLINIC | Age: 55
End: 2020-07-06

## 2020-07-06 DIAGNOSIS — Z98.890 OTHER SPECIFIED POSTPROCEDURAL STATES: Chronic | ICD-10-CM

## 2020-07-06 DIAGNOSIS — Z11.59 ENCOUNTER FOR SCREENING FOR OTHER VIRAL DISEASES: ICD-10-CM

## 2020-07-09 ENCOUNTER — EMERGENCY (EMERGENCY)
Facility: HOSPITAL | Age: 55
LOS: 0 days | Discharge: HOME | End: 2020-07-09
Attending: EMERGENCY MEDICINE | Admitting: EMERGENCY MEDICINE
Payer: COMMERCIAL

## 2020-07-09 ENCOUNTER — APPOINTMENT (OUTPATIENT)
Dept: INFUSION THERAPY | Facility: CLINIC | Age: 55
End: 2020-07-09

## 2020-07-09 ENCOUNTER — LABORATORY RESULT (OUTPATIENT)
Age: 55
End: 2020-07-09

## 2020-07-09 VITALS
OXYGEN SATURATION: 98 % | SYSTOLIC BLOOD PRESSURE: 123 MMHG | RESPIRATION RATE: 20 BRPM | TEMPERATURE: 99 F | HEART RATE: 94 BPM | DIASTOLIC BLOOD PRESSURE: 67 MMHG
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
## 2020-07-09 DIAGNOSIS — M25.579 PAIN IN UNSPECIFIED ANKLE AND JOINTS OF UNSPECIFIED FOOT: ICD-10-CM
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
## 2020-07-09 DIAGNOSIS — Z87.891 PERSONAL HISTORY OF NICOTINE DEPENDENCE: ICD-10-CM

## 2020-07-09 DIAGNOSIS — Z98.890 OTHER SPECIFIED POSTPROCEDURAL STATES: Chronic | ICD-10-CM
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
## 2020-07-09 DIAGNOSIS — Z98.890 OTHER SPECIFIED POSTPROCEDURAL STATES: ICD-10-CM

## 2020-07-09 DIAGNOSIS — S93.401A SPRAIN OF UNSPECIFIED LIGAMENT OF RIGHT ANKLE, INITIAL ENCOUNTER: ICD-10-CM

## 2020-07-09 DIAGNOSIS — I10 ESSENTIAL (PRIMARY) HYPERTENSION: ICD-10-CM

## 2020-07-09 DIAGNOSIS — F41.9 ANXIETY DISORDER, UNSPECIFIED: ICD-10-CM

## 2020-07-09 LAB
ALBUMIN SERPL ELPH-MCNC: 4.7 G/DL
ALP BLD-CCNC: 55 U/L
ALT SERPL-CCNC: 18 U/L
ANION GAP SERPL CALC-SCNC: 14 MMOL/L
AST SERPL-CCNC: 23 U/L
BILIRUB SERPL-MCNC: 0.4 MG/DL
BUN SERPL-MCNC: 16 MG/DL
CALCIUM SERPL-MCNC: 9.3 MG/DL
CHLORIDE SERPL-SCNC: 103 MMOL/L
CO2 SERPL-SCNC: 25 MMOL/L
CREAT SERPL-MCNC: 0.9 MG/DL
GLUCOSE SERPL-MCNC: 155 MG/DL
HCT VFR BLD CALC: 42.8 %
HGB BLD-MCNC: 14.8 G/DL
MCHC RBC-ENTMCNC: 33 PG
MCHC RBC-ENTMCNC: 34.6 G/DL
MCV RBC AUTO: 95.5 FL
PLATELET # BLD AUTO: 143 K/UL
PMV BLD: 10.3 FL
POTASSIUM SERPL-SCNC: 4.2 MMOL/L
PROT SERPL-MCNC: 6.5 G/DL
RBC # BLD: 4.48 M/UL
RBC # FLD: 11.9 %
SODIUM SERPL-SCNC: 142 MMOL/L
WBC # FLD AUTO: 4.13 K/UL

## 2020-07-09 PROCEDURE — 73630 X-RAY EXAM OF FOOT: CPT | Mod: 26,RT

## 2020-07-09 PROCEDURE — 99283 EMERGENCY DEPT VISIT LOW MDM: CPT

## 2020-07-09 PROCEDURE — 73610 X-RAY EXAM OF ANKLE: CPT | Mod: 26,RT

## 2020-07-09 RX ORDER — IBUPROFEN 200 MG
600 TABLET ORAL ONCE
Refills: 0 | Status: COMPLETED | OUTPATIENT
Start: 2020-07-09 | End: 2020-07-09

## 2020-07-09 RX ADMIN — Medication 600 MILLIGRAM(S): at 20:29

## 2020-07-09 NOTE — ED PROVIDER NOTE - OBJECTIVE STATEMENT
55 y/o male with a PMH of HTN, epilespy, anxiety, and depression presents to the ED with right ankle pain radiating to right foot, s/p slipping in mud and rolling his right ankle. pt admits pain is worse with weight bearing. pt denies numbness, tingling, inability to mobilize ankle or foot.

## 2020-07-09 NOTE — ED PROVIDER NOTE - PROGRESS NOTE DETAILS
pt denies pain during ed visit. refusing pain medication. I have personally performed a history and physical exam on this patient and personally directed the management of the patient. ATTENDING NOTE: 55 y/o M c/o R foot pain after jumping into a puddle. Pt has no difficulty ambulating. On exam: Pt has no tenderness to the R foot, no ecchymosis. Pedal pulses 2+ and equal. FROM of R foot and ankle, no numbness or tingling. Plan XR.

## 2020-07-09 NOTE — ED PROVIDER NOTE - NS ED ROS FT
CONST: No fever, chills or bodyaches  EYES: No pain, redness, drainage or visual changes.  ENT: No ear pain or discharge, nasal discharge or congestion. No sore throat  CARD: No chest pain, palpitations  RESP: No SOB, cough, hemoptysis. No hx of asthma or COPD  GI: No abdominal pain, N/V/D  : No urinary symptoms  MS: (+) right ankle and foot pain.   SKIN: No rashes  NEURO: No headache, dizziness, paresthesias or LOC

## 2020-07-09 NOTE — ED ADULT NURSE NOTE - NSIMPLEMENTINTERV_GEN_ALL_ED
Implemented All Universal Safety Interventions:  [place] to call system. Call bell, personal items and telephone within reach. Instruct patient to call for assistance. Room bathroom lighting operational. Non-slip footwear when patient is off stretcher. Physically safe environment: no spills, clutter or unnecessary equipment. Stretcher in lowest position, wheels locked, appropriate side rails in place.

## 2020-07-09 NOTE — ED PROVIDER NOTE - NSFOLLOWUPCLINICS_GEN_ALL_ED_FT
[facility]  Orthpedic  [address] [place], [place]   Phone: [phone]  Fax:   Follow Up Time: 1-3 Days

## 2020-07-09 NOTE — ED ADULT NURSE REASSESSMENT NOTE - NS ED NURSE REASSESS COMMENT FT1
pt is aaox3, nad ,respirations easy and regular, skin is warm dry and normal in color, pt is resting and comfortable

## 2020-07-09 NOTE — ED PROVIDER NOTE - NSFOLLOWUPINSTRUCTIONS_ED_ALL_ED_FT
Musculoskeletal Pain  Musculoskeletal pain refers to aches and pains in your bones, joints, muscles, and the tissues that surround them. This pain can occur in any part of the body. It can last for a short time (acute) or a long time (chronic).  A physical exam, lab tests, and imaging studies may be done to find the cause of your musculoskeletal pain.  Follow these instructions at home:     Lifestyle     Try to control or lower your stress levels. Stress increases muscle tension and can worsen musculoskeletal pain. It is important to recognize when you are anxious or stressed and learn ways to manage it. This may include:  Meditation or yoga.Cognitive or behavioral therapy.Acupuncture or massage therapy.You may continue all activities unless the activities cause more pain. When the pain gets better, slowly resume your normal activities. Gradually increase the intensity and duration of your activities or exercise.Managing pain, stiffness, and swelling     Take over-the-counter and prescription medicines only as told by your health care provider.When your pain is severe, bed rest may be helpful. Lie or sit in any position that is comfortable, but get out of bed and walk around at least every couple of hours.If directed, apply heat to the affected area as often as told by your health care provider. Use the heat source that your health care provider recommends, such as a moist heat pack or a heating pad.  Place a towel between your skin and the heat source.Leave the heat on for 20–30 minutes.Remove the heat if your skin turns bright red. This is especially important if you are unable to feel pain, heat, or cold. You may have a greater risk of getting burned.If directed, put ice on the painful area.  Put ice in a plastic bag.Place a towel between your skin and the bag.Leave the ice on for 20 minutes, 2–3 times a day.General instructions     Your health care provider may recommend that you see a physical therapist. This person can help you come up with a safe exercise program. Do any exercises as told by your physical therapist.Keep all follow-up visits, including any physical therapy visits, as told by your health care providers. This is important.Contact a health care provider if:  Your pain gets worse.Medicines do not help ease your pain.You cannot use the part of your body that hurts, such as your arm, leg, or neck.You have trouble sleeping.You have trouble doing your normal activities.Get help right away if:  You have a new injury and your pain is worse or different.You feel numb or you have tingling in the painful area.Summary  Musculoskeletal pain refers to aches and pains in your bones, joints, muscles, and the tissues that surround them.This pain can occur in any part of the body.Your health care provider may recommend that you see a physical therapist. This person can help you come up with a safe exercise program. Do any exercises as told by your physical therapist.Lower your stress level. Stress can worsen musculoskeletal pain. Ways to lower stress may include meditation, yoga, cognitive or behavioral therapy, acupuncture, and massage therapy.This information is not intended to replace advice given to you by your health care provider. Make sure you discuss any questions you have with your health care provider.    Document Released: 12/18/2006 Document Revised: 01/17/2018 Document Reviewed: 01/17/2018  [employer] Patient Education © 2019 [employer]

## 2020-07-09 NOTE — ED ADULT NURSE NOTE - CAS ELECT INFOMATION PROVIDED
DC instructions/pt instructed to follow up with orthopedic 1-3 days or return to ed for new or worsening symptoms

## 2020-07-12 LAB — FERRITIN SERPL-MCNC: 129 NG/ML

## 2020-07-13 ENCOUNTER — OUTPATIENT (OUTPATIENT)
Dept: OUTPATIENT SERVICES | Facility: HOSPITAL | Age: 55
LOS: 1 days | Discharge: HOME | End: 2020-07-13
Payer: COMMERCIAL

## 2020-07-13 DIAGNOSIS — Z98.890 OTHER SPECIFIED POSTPROCEDURAL STATES: Chronic | ICD-10-CM

## 2020-07-13 PROCEDURE — 92083 EXTENDED VISUAL FIELD XM: CPT | Mod: 26

## 2020-07-14 ENCOUNTER — OUTPATIENT (OUTPATIENT)
Dept: OUTPATIENT SERVICES | Facility: HOSPITAL | Age: 55
LOS: 1 days | Discharge: HOME | End: 2020-07-14

## 2020-07-14 ENCOUNTER — APPOINTMENT (OUTPATIENT)
Dept: PODIATRY | Facility: CLINIC | Age: 55
End: 2020-07-14
Payer: COMMERCIAL

## 2020-07-14 DIAGNOSIS — S93.609A UNSPECIFIED SPRAIN OF UNSPECIFIED FOOT, INITIAL ENCOUNTER: ICD-10-CM

## 2020-07-14 DIAGNOSIS — Z98.890 OTHER SPECIFIED POSTPROCEDURAL STATES: Chronic | ICD-10-CM

## 2020-07-14 DIAGNOSIS — S93.601A UNSPECIFIED SPRAIN OF RIGHT FOOT, INITIAL ENCOUNTER: ICD-10-CM

## 2020-07-14 PROCEDURE — 99202 OFFICE O/P NEW SF 15 MIN: CPT

## 2020-07-15 NOTE — PHYSICAL EXAM
[General Appearance - In No Acute Distress] : in no acute distress [General Appearance - Well Developed] : well developed [Full Pulse] : the pedal pulses are present [Abnormal Walk] : normal gait [Musculoskeletal - Swelling] : no joint swelling seen [Motor Tone] : muscle strength and tone were normal [Skin Turgor] : normal skin turgor [] : no rash [Skin Lesions] : no skin lesions [Cranial Nerves] : cranial nerves 2-12 were intact [Sensation] : the sensory exam was normal to light touch and pinprick [Deep Tendon Reflexes (DTR)] : deep tendon reflexes were 2+ and symmetric [Oriented To Time, Place, And Person] : oriented to person, place, and time [FreeTextEntry1] : RLE: No apain on palpation at the anterior aspect of the ankle joint. no pain at the ATFL, CFL, no pain at the styloid

## 2020-07-15 NOTE — END OF VISIT
[] : Resident [FreeTextEntry3] : RLE foot sprain-->resolved\par can return to activity w/ out restrictions.

## 2020-07-15 NOTE — HISTORY OF PRESENT ILLNESS
[FreeTextEntry1] : Pt. is a 54 year old male who presents to the clinic for a follow-up visit from the ER. Pt states that he had twisted his ankle last week but it has improved since then and he has no pain. Pt. denies any other pedal complaints at this time.

## 2020-07-15 NOTE — PROCEDURE
[FreeTextEntry1] : Pt. was seen and evaluated\par Discussed treatment and condition w/ patient\par Xrays reviewed: no indication of bony abnormalities;no fractures or dislocations\par Pt. to RTC PRN

## 2020-07-24 ENCOUNTER — LABORATORY RESULT (OUTPATIENT)
Age: 55
End: 2020-07-24

## 2020-07-24 ENCOUNTER — OUTPATIENT (OUTPATIENT)
Dept: OUTPATIENT SERVICES | Facility: HOSPITAL | Age: 55
LOS: 1 days | Discharge: HOME | End: 2020-07-24

## 2020-07-24 DIAGNOSIS — Z98.890 OTHER SPECIFIED POSTPROCEDURAL STATES: Chronic | ICD-10-CM

## 2020-07-24 DIAGNOSIS — Z11.59 ENCOUNTER FOR SCREENING FOR OTHER VIRAL DISEASES: ICD-10-CM

## 2020-07-27 ENCOUNTER — APPOINTMENT (OUTPATIENT)
Dept: INFUSION THERAPY | Facility: CLINIC | Age: 55
End: 2020-07-27

## 2020-07-27 ENCOUNTER — OUTPATIENT (OUTPATIENT)
Dept: OUTPATIENT SERVICES | Facility: HOSPITAL | Age: 55
LOS: 1 days | Discharge: HOME | End: 2020-07-27

## 2020-07-27 ENCOUNTER — APPOINTMENT (OUTPATIENT)
Dept: INTERNAL MEDICINE | Facility: CLINIC | Age: 55
End: 2020-07-27
Payer: COMMERCIAL

## 2020-07-27 ENCOUNTER — LABORATORY RESULT (OUTPATIENT)
Age: 55
End: 2020-07-27

## 2020-07-27 VITALS
HEART RATE: 93 BPM | SYSTOLIC BLOOD PRESSURE: 107 MMHG | DIASTOLIC BLOOD PRESSURE: 72 MMHG | BODY MASS INDEX: 24.44 KG/M2 | HEIGHT: 69 IN | TEMPERATURE: 97.6 F | WEIGHT: 165 LBS

## 2020-07-27 DIAGNOSIS — Z98.890 OTHER SPECIFIED POSTPROCEDURAL STATES: Chronic | ICD-10-CM

## 2020-07-27 LAB
HCT VFR BLD CALC: 40.2 %
HGB BLD-MCNC: 13.8 G/DL
MCHC RBC-ENTMCNC: 33.6 PG
MCHC RBC-ENTMCNC: 34.3 G/DL
MCV RBC AUTO: 97.8 FL
PLATELET # BLD AUTO: 154 K/UL
PMV BLD: 10.3 FL
RBC # BLD: 4.11 M/UL
RBC # FLD: 12 %
WBC # FLD AUTO: 4.56 K/UL

## 2020-07-27 PROCEDURE — 99213 OFFICE O/P EST LOW 20 MIN: CPT | Mod: GC

## 2020-07-28 DIAGNOSIS — E78.5 HYPERLIPIDEMIA, UNSPECIFIED: ICD-10-CM

## 2020-07-28 DIAGNOSIS — F20.9 SCHIZOPHRENIA, UNSPECIFIED: ICD-10-CM

## 2020-07-28 DIAGNOSIS — E83.110 HEREDITARY HEMOCHROMATOSIS: ICD-10-CM

## 2020-07-28 DIAGNOSIS — I10 ESSENTIAL (PRIMARY) HYPERTENSION: ICD-10-CM

## 2020-07-28 DIAGNOSIS — G40.909 EPILEPSY, UNSPECIFIED, NOT INTRACTABLE, WITHOUT STATUS EPILEPTICUS: ICD-10-CM

## 2020-07-28 LAB — FERRITIN SERPL-MCNC: 86 NG/ML

## 2020-07-29 NOTE — ASSESSMENT
[FreeTextEntry1] : 54 M with past medical history of colonic tubular adenoma, DLD, Gout, Hemochromatosis, HTN, Schizophrenia and seizure disorder. He is here for a follow up visit\par \par # Gout \par - Stable on allopurinol \par - No recent joint pain \par \par # Hemochromatosis \par - Stable; ferritin- 98; goal ; repeat every 3 months\par - Scheduled for phlebotomy today \par - Continue to follow heme/onc \par - A1C 5.2%, TSH 1.98 in february\par - Hemoglobin 14.8 this month \par \par # Schizophrenia \par - Patient is on clozapine \par - WBC 4.13\par - No hallucinations or delusion\par - Appropriate appearance, mood, affect, and communication/interactions \par - Continue to follow Psychiatry \par \par # Seizure disorder\par - Stable on Depakote \par - No seizures in many years\par \par # HTN\par - Stable- well controlled on Ramipril \par - Today 107/72\par \par # Colonic polyps\par - tubular adenoma transverse colon\par - f/u colonscopy in 3 years\par - following with GI \par \par # Health care maintenance \par - repeat colonoscopy due 2022\par - rtc 6 mos.

## 2020-07-29 NOTE — HISTORY OF PRESENT ILLNESS
[FreeTextEntry1] : follow up visit [de-identified] : 54 M with past medical history of DLD, Gout, Hemochromatosis, HTN, Schizophrenia and seizure disorder. He is here for a follow up visit. Pt has no active complaints. \par

## 2020-07-29 NOTE — HISTORY OF PRESENT ILLNESS
[FreeTextEntry1] : follow up visit [de-identified] : 54 M with past medical history of DLD, Gout, Hemochromatosis, HTN, Schizophrenia and seizure disorder. He is here for a follow up visit. Pt has no active complaints. \par

## 2020-08-17 ENCOUNTER — APPOINTMENT (OUTPATIENT)
Dept: INFUSION THERAPY | Facility: CLINIC | Age: 55
End: 2020-08-17

## 2020-09-09 ENCOUNTER — APPOINTMENT (OUTPATIENT)
Dept: HEMATOLOGY ONCOLOGY | Facility: CLINIC | Age: 55
End: 2020-09-09

## 2020-09-09 ENCOUNTER — LABORATORY RESULT (OUTPATIENT)
Age: 55
End: 2020-09-09

## 2020-09-14 ENCOUNTER — OUTPATIENT (OUTPATIENT)
Dept: OUTPATIENT SERVICES | Facility: HOSPITAL | Age: 55
LOS: 1 days | Discharge: HOME | End: 2020-09-14
Payer: COMMERCIAL

## 2020-09-14 ENCOUNTER — APPOINTMENT (OUTPATIENT)
Dept: HEMATOLOGY ONCOLOGY | Facility: CLINIC | Age: 55
End: 2020-09-14

## 2020-09-14 DIAGNOSIS — H02.88A MEIBOMIAN GLAND DYSFUNCTION RIGHT EYE, UPPER AND LOWER EYELIDS: ICD-10-CM

## 2020-09-14 DIAGNOSIS — Z98.890 OTHER SPECIFIED POSTPROCEDURAL STATES: Chronic | ICD-10-CM

## 2020-09-14 DIAGNOSIS — H40.023 OPEN ANGLE WITH BORDERLINE FINDINGS, HIGH RISK, BILATERAL: ICD-10-CM

## 2020-09-14 LAB
ALBUMIN SERPL ELPH-MCNC: 4.4 G/DL
ALP BLD-CCNC: 58 U/L
ALT SERPL-CCNC: 20 U/L
ANION GAP SERPL CALC-SCNC: 9 MMOL/L
AST SERPL-CCNC: 24 U/L
BILIRUB SERPL-MCNC: 0.5 MG/DL
BUN SERPL-MCNC: 11 MG/DL
CALCIUM SERPL-MCNC: 9.5 MG/DL
CHLORIDE SERPL-SCNC: 105 MMOL/L
CO2 SERPL-SCNC: 28 MMOL/L
CREAT SERPL-MCNC: 1 MG/DL
FERRITIN SERPL-MCNC: 69 NG/ML
GLUCOSE SERPL-MCNC: 88 MG/DL
HCT VFR BLD CALC: 42.4 %
HGB BLD-MCNC: 14.1 G/DL
MCHC RBC-ENTMCNC: 32.6 PG
MCHC RBC-ENTMCNC: 33.3 G/DL
MCV RBC AUTO: 97.9 FL
PLATELET # BLD AUTO: 146 K/UL
PMV BLD: 10.3 FL
POTASSIUM SERPL-SCNC: 4.4 MMOL/L
PROT SERPL-MCNC: 6.3 G/DL
RBC # BLD: 4.33 M/UL
RBC # FLD: 12.2 %
SODIUM SERPL-SCNC: 142 MMOL/L
WBC # FLD AUTO: 4.66 K/UL

## 2020-09-14 PROCEDURE — 92020 GONIOSCOPY: CPT

## 2020-09-14 PROCEDURE — 92012 INTRM OPH EXAM EST PATIENT: CPT

## 2020-09-14 PROCEDURE — 92133 CPTRZD OPH DX IMG PST SGM ON: CPT | Mod: 26

## 2020-09-21 ENCOUNTER — OUTPATIENT (OUTPATIENT)
Dept: OUTPATIENT SERVICES | Facility: HOSPITAL | Age: 55
LOS: 1 days | Discharge: HOME | End: 2020-09-21

## 2020-09-21 ENCOUNTER — APPOINTMENT (OUTPATIENT)
Dept: HEMATOLOGY ONCOLOGY | Facility: CLINIC | Age: 55
End: 2020-09-21
Payer: COMMERCIAL

## 2020-09-21 VITALS
RESPIRATION RATE: 16 BRPM | DIASTOLIC BLOOD PRESSURE: 61 MMHG | HEIGHT: 69 IN | TEMPERATURE: 97.1 F | WEIGHT: 171 LBS | BODY MASS INDEX: 25.33 KG/M2 | HEART RATE: 86 BPM | SYSTOLIC BLOOD PRESSURE: 123 MMHG

## 2020-09-21 DIAGNOSIS — E83.110 HEREDITARY HEMOCHROMATOSIS: ICD-10-CM

## 2020-09-21 DIAGNOSIS — Z98.890 OTHER SPECIFIED POSTPROCEDURAL STATES: Chronic | ICD-10-CM

## 2020-09-21 PROCEDURE — 99213 OFFICE O/P EST LOW 20 MIN: CPT

## 2020-09-24 NOTE — ASSESSMENT
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  The patient has been getting phlebotomies as needed.\par - If the ferritin is not a goal, ferritin of 50 to 100; we will arrange for more phlebotomy\par - will  stop  US abdomen  every 6 months since he has no evidence of cirrhosis ; last done 12/2019 [facility]\par - blood work in December and he we see me in March next year\par \par \par The patient also knows not to take iron supplements as well as not to drink any alcoholic beverages.\par \par RTC  as above and will call with iron results and arrange for phlebotomy as needed.

## 2020-09-24 NOTE — HISTORY OF PRESENT ILLNESS
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  He also had a liver biopsy, consistent with iron overload.  He has been getting intermittent phlebotomies.  He has not had phlebotomy now in several months.  His most recent ferritin was from 08/16/2016; it demonstrated a ferritin of 58.  He now presents for followup evaluation.  He states he is doing quite well.  He has no complaints.\par \par HCM\par Next Colonoscopy due in Nov 2022 [de-identified] : 1/23/17\par He presents for follow up. His ferritin in Oct was 101 and he underwent a phlebotomy of 1 unit. HIs  AFP was 7 and ultrasound of the liver was normal in November. He comes in for follow up. Hs no complaints.\par \par 4/19/17\par He presents for follow up. Last ferritin  was under 100.  He has no complaints,\par \par 7/18/17\par No events. Denies weight loss, fevers. No new meds. Last AFP an ferritin were WNL\par \par 10/31/17\par He is doing well. He had a phlebotomyin September and October. No complaints\par \par 1/23/18\par He is here for follow up for hemochromatisis. He has no complaints. He had  CT abd/Pelvis this month that was normal. His Ferretin from this month is in the 60s.\par \par 04/24/2018\par Patient is here for scheduled follow up visit. He denies any complaints. His last ferritin(03/2018) was 81.\par \par 8/14/18\par He is here for follow up.  HIs ferritin from 8/10 was 98. He has no complaints. Last AFP 2.5 in April\par \par 2/12/19\par He is here for follow up.  He had US in 8/2018 by Dr. Aviles IMPRESSION: 1.  Bilateral renal cysts.  2.  Pancreas is mostly obscured by overlying bowel gas and cannot be  assessed.  3.  Otherwise, unremarkable abdominal ultrasound.\par Last Ferritin was 90 in 12/2018.\par \par He is doing well has no complaints. \par \par 8/13/19\par He is here for follow up he had a colonoscopy by Dr. Aviles: 8/3/19  Polyp (5 mm) in the transverse colon. (Polypectomy). \par  Polyp (1 cm) in the transverse colon. (Polypectomy). \par  Polyp (5 mm) in the hepatic flexure. (Polypectomy). \par  Otherwise normal colon. \par Plan: Colonoscopy in 3 years. \par Path was negative. Last ferritin in may was 94.\par \par He has no complaints\par \par 2/10/2020\par Patient is here for a follow-up visit for hereditary hemochromatosis.  He is feeling well with no new complaints.  Most recent CBC is stable.  Ferritin from 1.17.2020 down to 109.  He was phlebotomized that same day.    \par US Abd (12.6.2019) IMPRESSION:Unremarkable right upper quadrant ultrasound.\par \par 5/11/2020\par He is doing well he has no compalitns. \par \par \par 9/21/20\par He is here for follow up. His ferritin was at goal earlier this month. He has no complaits.

## 2020-09-24 NOTE — REVIEW OF SYSTEMS
[Fever] : no fever [Chills] : no chills [Chest Pain] : no chest pain [Shortness Of Breath] : no shortness of breath [Negative] : Heme/Lymph

## 2020-10-26 ENCOUNTER — APPOINTMENT (OUTPATIENT)
Dept: OPHTHALMOLOGY | Facility: CLINIC | Age: 55
End: 2020-10-26

## 2020-10-26 ENCOUNTER — OUTPATIENT (OUTPATIENT)
Dept: OUTPATIENT SERVICES | Facility: HOSPITAL | Age: 55
LOS: 1 days | Discharge: HOME | End: 2020-10-26
Payer: COMMERCIAL

## 2020-10-26 DIAGNOSIS — Z98.890 OTHER SPECIFIED POSTPROCEDURAL STATES: Chronic | ICD-10-CM

## 2020-10-26 DIAGNOSIS — H40.023 OPEN ANGLE WITH BORDERLINE FINDINGS, HIGH RISK, BILATERAL: ICD-10-CM

## 2020-10-26 PROCEDURE — 92083 EXTENDED VISUAL FIELD XM: CPT | Mod: 26

## 2020-12-14 ENCOUNTER — APPOINTMENT (OUTPATIENT)
Dept: HEMATOLOGY ONCOLOGY | Facility: CLINIC | Age: 55
End: 2020-12-14

## 2020-12-14 ENCOUNTER — LABORATORY RESULT (OUTPATIENT)
Age: 55
End: 2020-12-14

## 2020-12-14 LAB
HCT VFR BLD CALC: 43.6 %
HGB BLD-MCNC: 15.1 G/DL
MCHC RBC-ENTMCNC: 33.3 PG
MCHC RBC-ENTMCNC: 34.6 G/DL
MCV RBC AUTO: 96 FL
PLATELET # BLD AUTO: 141 K/UL
PMV BLD: 10.2 FL
RBC # BLD: 4.54 M/UL
RBC # FLD: 12.1 %
WBC # FLD AUTO: 4.03 K/UL

## 2020-12-15 LAB
ALBUMIN SERPL ELPH-MCNC: 4.7 G/DL
ALP BLD-CCNC: 58 U/L
ALT SERPL-CCNC: 32 U/L
ANION GAP SERPL CALC-SCNC: 10 MMOL/L
AST SERPL-CCNC: 33 U/L
BILIRUB SERPL-MCNC: 0.5 MG/DL
BUN SERPL-MCNC: 15 MG/DL
CALCIUM SERPL-MCNC: 9.8 MG/DL
CHLORIDE SERPL-SCNC: 102 MMOL/L
CO2 SERPL-SCNC: 29 MMOL/L
CREAT SERPL-MCNC: 1 MG/DL
FERRITIN SERPL-MCNC: 166 NG/ML
GLUCOSE SERPL-MCNC: 92 MG/DL
IRON SATN MFR SERPL: 56 %
IRON SERPL-MCNC: 187 UG/DL
POTASSIUM SERPL-SCNC: 4.3 MMOL/L
PROT SERPL-MCNC: 6.5 G/DL
SODIUM SERPL-SCNC: 141 MMOL/L
TIBC SERPL-MCNC: 336 UG/DL
UIBC SERPL-MCNC: 149 UG/DL

## 2020-12-18 ENCOUNTER — OUTPATIENT (OUTPATIENT)
Dept: OUTPATIENT SERVICES | Facility: HOSPITAL | Age: 55
LOS: 1 days | Discharge: HOME | End: 2020-12-18
Payer: COMMERCIAL

## 2020-12-18 ENCOUNTER — APPOINTMENT (OUTPATIENT)
Dept: OPHTHALMOLOGY | Facility: CLINIC | Age: 55
End: 2020-12-18

## 2020-12-18 DIAGNOSIS — Z98.890 OTHER SPECIFIED POSTPROCEDURAL STATES: Chronic | ICD-10-CM

## 2020-12-18 PROCEDURE — 92202 OPSCPY EXTND ON/MAC DRAW: CPT

## 2020-12-18 PROCEDURE — 92012 INTRM OPH EXAM EST PATIENT: CPT

## 2020-12-18 PROCEDURE — 92134 CPTRZ OPH DX IMG PST SGM RTA: CPT | Mod: 26

## 2020-12-23 DIAGNOSIS — H02.88A MEIBOMIAN GLAND DYSFUNCTION RIGHT EYE, UPPER AND LOWER EYELIDS: ICD-10-CM

## 2020-12-23 DIAGNOSIS — H35.343 MACULAR CYST, HOLE, OR PSEUDOHOLE, BILATERAL: ICD-10-CM

## 2020-12-23 DIAGNOSIS — H40.1131 PRIMARY OPEN-ANGLE GLAUCOMA, BILATERAL, MILD STAGE: ICD-10-CM

## 2021-01-12 ENCOUNTER — OUTPATIENT (OUTPATIENT)
Dept: OUTPATIENT SERVICES | Facility: HOSPITAL | Age: 56
LOS: 1 days | Discharge: HOME | End: 2021-01-12

## 2021-01-12 ENCOUNTER — LABORATORY RESULT (OUTPATIENT)
Age: 56
End: 2021-01-12

## 2021-01-12 DIAGNOSIS — Z98.890 OTHER SPECIFIED POSTPROCEDURAL STATES: Chronic | ICD-10-CM

## 2021-01-13 DIAGNOSIS — Z11.59 ENCOUNTER FOR SCREENING FOR OTHER VIRAL DISEASES: ICD-10-CM

## 2021-01-14 ENCOUNTER — LABORATORY RESULT (OUTPATIENT)
Age: 56
End: 2021-01-14

## 2021-01-14 ENCOUNTER — OUTPATIENT (OUTPATIENT)
Dept: OUTPATIENT SERVICES | Facility: HOSPITAL | Age: 56
LOS: 1 days | Discharge: HOME | End: 2021-01-14

## 2021-01-14 ENCOUNTER — APPOINTMENT (OUTPATIENT)
Dept: INFUSION THERAPY | Facility: CLINIC | Age: 56
End: 2021-01-14

## 2021-01-14 DIAGNOSIS — E83.110 HEREDITARY HEMOCHROMATOSIS: ICD-10-CM

## 2021-01-14 DIAGNOSIS — Z98.890 OTHER SPECIFIED POSTPROCEDURAL STATES: Chronic | ICD-10-CM

## 2021-01-14 LAB
HCT VFR BLD CALC: 42.7 %
HGB BLD-MCNC: 14.8 G/DL
MCHC RBC-ENTMCNC: 33.5 PG
MCHC RBC-ENTMCNC: 34.7 G/DL
MCV RBC AUTO: 96.6 FL
PLATELET # BLD AUTO: 150 K/UL
PMV BLD: 10.3 FL
RBC # BLD: 4.42 M/UL
RBC # FLD: 12.2 %
WBC # FLD AUTO: 5.36 K/UL

## 2021-01-19 ENCOUNTER — OUTPATIENT (OUTPATIENT)
Dept: OUTPATIENT SERVICES | Facility: HOSPITAL | Age: 56
LOS: 1 days | Discharge: HOME | End: 2021-01-19

## 2021-01-19 ENCOUNTER — APPOINTMENT (OUTPATIENT)
Dept: INTERNAL MEDICINE | Facility: CLINIC | Age: 56
End: 2021-01-19
Payer: COMMERCIAL

## 2021-01-19 VITALS
WEIGHT: 173 LBS | TEMPERATURE: 98.4 F | HEART RATE: 96 BPM | HEIGHT: 69 IN | SYSTOLIC BLOOD PRESSURE: 119 MMHG | DIASTOLIC BLOOD PRESSURE: 79 MMHG | OXYGEN SATURATION: 86 % | BODY MASS INDEX: 25.62 KG/M2

## 2021-01-19 DIAGNOSIS — Z98.890 OTHER SPECIFIED POSTPROCEDURAL STATES: Chronic | ICD-10-CM

## 2021-01-19 PROCEDURE — 99213 OFFICE O/P EST LOW 20 MIN: CPT | Mod: GC

## 2021-01-19 RX ORDER — LATANOPROST/PF 0.005 %
0.01 DROPS OPHTHALMIC (EYE)
Qty: 2 | Refills: 0 | Status: COMPLETED | COMMUNITY
Start: 2020-12-18

## 2021-01-19 RX ORDER — ALLOPURINOL 300 MG/1
300 TABLET ORAL DAILY
Qty: 30 | Refills: 5 | Status: DISCONTINUED | COMMUNITY
Start: 2017-01-11 | End: 2021-01-19

## 2021-01-23 LAB
CHOLEST SERPL-MCNC: 205 MG/DL
ESTIMATED AVERAGE GLUCOSE: 100 MG/DL
HBA1C MFR BLD HPLC: 5.1 %
HDLC SERPL-MCNC: 86 MG/DL
LDLC SERPL CALC-MCNC: 82 MG/DL
NONHDLC SERPL-MCNC: 119 MG/DL
TRIGL SERPL-MCNC: 188 MG/DL

## 2021-01-27 ENCOUNTER — OUTPATIENT (OUTPATIENT)
Dept: OUTPATIENT SERVICES | Facility: HOSPITAL | Age: 56
LOS: 1 days | Discharge: HOME | End: 2021-01-27
Payer: COMMERCIAL

## 2021-01-27 ENCOUNTER — APPOINTMENT (OUTPATIENT)
Dept: OPHTHALMOLOGY | Facility: CLINIC | Age: 56
End: 2021-01-27

## 2021-01-27 DIAGNOSIS — Z98.890 OTHER SPECIFIED POSTPROCEDURAL STATES: Chronic | ICD-10-CM

## 2021-01-27 DIAGNOSIS — H40.1131 PRIMARY OPEN-ANGLE GLAUCOMA, BILATERAL, MILD STAGE: ICD-10-CM

## 2021-01-27 PROCEDURE — 92012 INTRM OPH EXAM EST PATIENT: CPT

## 2021-01-28 DIAGNOSIS — E78.5 HYPERLIPIDEMIA, UNSPECIFIED: ICD-10-CM

## 2021-01-28 DIAGNOSIS — L98.9 DISORDER OF THE SKIN AND SUBCUTANEOUS TISSUE, UNSPECIFIED: ICD-10-CM

## 2021-01-28 DIAGNOSIS — I10 ESSENTIAL (PRIMARY) HYPERTENSION: ICD-10-CM

## 2021-01-28 DIAGNOSIS — Z12.11 ENCOUNTER FOR SCREENING FOR MALIGNANT NEOPLASM OF COLON: ICD-10-CM

## 2021-01-28 DIAGNOSIS — E83.10 DISORDER OF IRON METABOLISM, UNSPECIFIED: ICD-10-CM

## 2021-02-05 NOTE — HISTORY OF PRESENT ILLNESS
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  He has been getting intermittent phlebotomies. He has not had phlebotomy now in several months.  He now presents for followup evaluation. He states he is doing quite well. He has no complaints. . He is here for a follow up visit. Pt has no active complaints. \par

## 2021-02-05 NOTE — REVIEW OF SYSTEMS
[Skin Rash] : skin rash [Fever] : no fever [Chills] : no chills [Discharge] : no discharge [Pain] : no pain [Redness] : no redness [Earache] : no earache [Hearing Loss] : no hearing loss [Nosebleeds] : no nosebleeds [Nasal Discharge] : no nasal discharge [Sore Throat] : no sore throat [Chest Pain] : no chest pain [Palpitations] : no palpitations [Claudication] : no  leg claudication [Orthopena] : no orthopnea [Shortness Of Breath] : no shortness of breath [Wheezing] : no wheezing [Cough] : no cough [Abdominal Pain] : no abdominal pain [Nausea] : no nausea [Constipation] : no constipation [Vomiting] : no vomiting [Joint Pain] : no joint pain [Joint Stiffness] : no joint stiffness [Muscle Pain] : no muscle pain [Back Pain] : no back pain [Itching] : no itching [Mole Changes] : no mole changes [Nail Changes] : no nail changes [Headache] : no headache [Dizziness] : no dizziness [Suicidal] : not suicidal [Insomnia] : no insomnia [Anxiety] : no anxiety [Depression] : no depression [de-identified] : whitish scales on scalp, non pruritic

## 2021-02-05 NOTE — PHYSICAL EXAM
[No Acute Distress] : no acute distress [Well Nourished] : well nourished [Well Developed] : well developed [Well-Appearing] : well-appearing [Normal Sclera/Conjunctiva] : normal sclera/conjunctiva [PERRL] : pupils equal round and reactive to light [EOMI] : extraocular movements intact [Normal Outer Ear/Nose] : the outer ears and nose were normal in appearance [Normal Oropharynx] : the oropharynx was normal [Normal TMs] : both tympanic membranes were normal [No JVD] : no jugular venous distention [No Lymphadenopathy] : no lymphadenopathy [Supple] : supple [No Respiratory Distress] : no respiratory distress  [No Accessory Muscle Use] : no accessory muscle use [Clear to Auscultation] : lungs were clear to auscultation bilaterally [Normal Rate] : normal rate  [Regular Rhythm] : with a regular rhythm [No Carotid Bruits] : no carotid bruits [No Abdominal Bruit] : a ~M bruit was not heard ~T in the abdomen [No Varicosities] : no varicosities [No Edema] : there was no peripheral edema [Declined Breast Exam] : declined breast exam  [No Axillary Lymphadenopathy] : no axillary lymphadenopathy [Soft] : abdomen soft [Non Tender] : non-tender [Non-distended] : non-distended [No HSM] : no HSM [Normal Supraclavicular Nodes] : no supraclavicular lymphadenopathy [Normal Anterior Cervical Nodes] : no anterior cervical lymphadenopathy [No CVA Tenderness] : no CVA  tenderness [No Spinal Tenderness] : no spinal tenderness [No Joint Swelling] : no joint swelling [Grossly Normal Strength/Tone] : grossly normal strength/tone [No Rash] : no rash [Coordination Grossly Intact] : coordination grossly intact [No Focal Deficits] : no focal deficits [Stool Occult Blood] : stool negative for occult blood

## 2021-02-05 NOTE — ASSESSMENT
[FreeTextEntry1] : 55 M with past medical history of colonic tubular adenoma, DLD, Gout, Hemochromatosis, HTN, Schizophrenia and seizure disorder. He is here for a follow up visit\par \par # Gout \par - Stable on allopurinol , takes 400 mg daily\par - No recent joint pain \par \par # scaly rash on scalp\par - concerning for actinic keratosis\par - Refer to Derm \par \par # Hemochromatosis \par - Stable; ferritin-  166 ; goal ; s/p phlebotomy last week. \par - Continue to follow heme/onc ( Dr. Rogers)\par - A1C 5.2% ( will repeat) , TSH 1.98 in february "20\par - Hemoglobin 14.8 this month \par \par # hypercholestrolemia\par - TG ~ 116, Chol ~ 211. HDL ~ 86 , ASVCD ~ 3.8%\par - will repeat lipid profile and A1C levels\par \par # Schizophrenia \par - Patient is on clozapine \par - WBC 4.13\par - No hallucinations or delusion\par - Appropriate appearance, mood, affect, and communication/interactions \par - Continue to follow Psychiatry \par \par # Seizure disorder\par - Stable on Depakote \par - No seizures in many years\par \par # HTN\par - Stable- well controlled on lisinopril 10 mg\par - Today 119/79 mmHg\par \par # Colonic polyps\par - tubular adenoma transverse colon\par - f/u colonscopy in 3 years ( due in 2022) \par - following with GI \par \par # Health care maintenance \par - repeat colonoscopy due 2022\par - received flu shot at his pharmacy in 09/20\par - repeat A1C and Lipid profile\par - rtc 6 mos.

## 2021-03-15 ENCOUNTER — APPOINTMENT (OUTPATIENT)
Dept: HEMATOLOGY ONCOLOGY | Facility: CLINIC | Age: 56
End: 2021-03-15
Payer: COMMERCIAL

## 2021-03-15 ENCOUNTER — APPOINTMENT (OUTPATIENT)
Dept: INFUSION THERAPY | Facility: CLINIC | Age: 56
End: 2021-03-15

## 2021-03-15 ENCOUNTER — LABORATORY RESULT (OUTPATIENT)
Age: 56
End: 2021-03-15

## 2021-03-15 VITALS
RESPIRATION RATE: 16 BRPM | WEIGHT: 172 LBS | TEMPERATURE: 97.9 F | BODY MASS INDEX: 25.48 KG/M2 | HEIGHT: 69 IN | SYSTOLIC BLOOD PRESSURE: 124 MMHG | HEART RATE: 100 BPM | DIASTOLIC BLOOD PRESSURE: 90 MMHG

## 2021-03-15 LAB
ALBUMIN SERPL ELPH-MCNC: 4.5 G/DL
ALP BLD-CCNC: 66 U/L
ALT SERPL-CCNC: 21 U/L
ANION GAP SERPL CALC-SCNC: 10 MMOL/L
AST SERPL-CCNC: 23 U/L
BILIRUB SERPL-MCNC: 0.6 MG/DL
BUN SERPL-MCNC: 14 MG/DL
CALCIUM SERPL-MCNC: 9.4 MG/DL
CHLORIDE SERPL-SCNC: 102 MMOL/L
CO2 SERPL-SCNC: 26 MMOL/L
CREAT SERPL-MCNC: 0.8 MG/DL
GLUCOSE SERPL-MCNC: 93 MG/DL
HCT VFR BLD CALC: 44.5 %
HGB BLD-MCNC: 15.5 G/DL
IRON SATN MFR SERPL: 82 %
IRON SERPL-MCNC: 267 UG/DL
MCHC RBC-ENTMCNC: 33.4 PG
MCHC RBC-ENTMCNC: 34.8 G/DL
MCV RBC AUTO: 95.9 FL
PLATELET # BLD AUTO: 149 K/UL
PMV BLD: 9.9 FL
POTASSIUM SERPL-SCNC: 4.5 MMOL/L
PROT SERPL-MCNC: 6.5 G/DL
RBC # BLD: 4.64 M/UL
RBC # FLD: 11.8 %
SODIUM SERPL-SCNC: 138 MMOL/L
TIBC SERPL-MCNC: 326 UG/DL
UIBC SERPL-MCNC: 59 UG/DL
WBC # FLD AUTO: 6.77 K/UL

## 2021-03-15 PROCEDURE — 99212 OFFICE O/P EST SF 10 MIN: CPT

## 2021-03-15 NOTE — ASSESSMENT
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  The patient has been getting phlebotomies as needed.\par - If the ferritin is not a goal, ferritin of 50 to 100; we will arrange for more phlebotomy\par - will  stop  US abdomen  every 6 months since he has no evidence of cirrhosis ; last done 12/2019 [facility]\par - blood work in December and he we see me in March next year\par \par \par The patient also knows not to take iron supplements as well as not to drink any alcoholic beverages.\par \par RTC  depending on iron levels. Will call with today iron results and go from there

## 2021-03-15 NOTE — HISTORY OF PRESENT ILLNESS
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  He also had a liver biopsy, consistent with iron overload.  He has been getting intermittent phlebotomies.  He has not had phlebotomy now in several months.  His most recent ferritin was from 08/16/2016; it demonstrated a ferritin of 58.  He now presents for followup evaluation.  He states he is doing quite well.  He has no complaints.\par \par HCM\par Next Colonoscopy due in Nov 2022 [de-identified] : 1/23/17\par He presents for follow up. His ferritin in Oct was 101 and he underwent a phlebotomy of 1 unit. HIs  AFP was 7 and ultrasound of the liver was normal in November. He comes in for follow up. Hs no complaints.\par \par 4/19/17\par He presents for follow up. Last ferritin  was under 100.  He has no complaints,\par \par 7/18/17\par No events. Denies weight loss, fevers. No new meds. Last AFP an ferritin were WNL\par \par 10/31/17\par He is doing well. He had a phlebotomyin September and October. No complaints\par \par 1/23/18\par He is here for follow up for hemochromatisis. He has no complaints. He had  CT abd/Pelvis this month that was normal. His Ferretin from this month is in the 60s.\par \par 04/24/2018\par Patient is here for scheduled follow up visit. He denies any complaints. His last ferritin(03/2018) was 81.\par \par 8/14/18\par He is here for follow up.  HIs ferritin from 8/10 was 98. He has no complaints. Last AFP 2.5 in April\par \par 2/12/19\par He is here for follow up.  He had US in 8/2018 by Dr. Aviles IMPRESSION: 1.  Bilateral renal cysts.  2.  Pancreas is mostly obscured by overlying bowel gas and cannot be  assessed.  3.  Otherwise, unremarkable abdominal ultrasound.\par Last Ferritin was 90 in 12/2018.\par \par He is doing well has no complaints. \par \par 8/13/19\par He is here for follow up he had a colonoscopy by Dr. Aviles: 8/3/19  Polyp (5 mm) in the transverse colon. (Polypectomy). \par  Polyp (1 cm) in the transverse colon. (Polypectomy). \par  Polyp (5 mm) in the hepatic flexure. (Polypectomy). \par  Otherwise normal colon. \par Plan: Colonoscopy in 3 years. \par Path was negative. Last ferritin in may was 94.\par \par He has no complaints\par \par 2/10/2020\par Patient is here for a follow-up visit for hereditary hemochromatosis.  He is feeling well with no new complaints.  Most recent CBC is stable.  Ferritin from 1.17.2020 down to 109.  He was phlebotomized that same day.    \par US Abd (12.6.2019) IMPRESSION:Unremarkable right upper quadrant ultrasound.\par \par 5/11/2020\par He is doing well he has no compalitns. \par \par \par 9/21/20\par He is here for follow up. His ferritin was at goal earlier this month. He has no complaits.\par \par \par 3/15/21\par He is here for follow up feels well. He had phlebotomy in Januray 2021. Ferritin was 166 in December 2020

## 2021-03-16 LAB — FERRITIN SERPL-MCNC: 100 NG/ML

## 2021-03-19 ENCOUNTER — APPOINTMENT (OUTPATIENT)
Dept: INFUSION THERAPY | Facility: CLINIC | Age: 56
End: 2021-03-19

## 2021-03-29 ENCOUNTER — APPOINTMENT (OUTPATIENT)
Dept: INFUSION THERAPY | Facility: CLINIC | Age: 56
End: 2021-03-29

## 2021-03-29 ENCOUNTER — LABORATORY RESULT (OUTPATIENT)
Age: 56
End: 2021-03-29

## 2021-03-29 LAB
HCT VFR BLD CALC: 42.7 %
HGB BLD-MCNC: 15 G/DL
MCHC RBC-ENTMCNC: 33.7 PG
MCHC RBC-ENTMCNC: 35.1 G/DL
MCV RBC AUTO: 96 FL
PLATELET # BLD AUTO: 148 K/UL
PMV BLD: 10.7 FL
RBC # BLD: 4.45 M/UL
RBC # FLD: 11.7 %
WBC # FLD AUTO: 5 K/UL

## 2021-04-14 ENCOUNTER — APPOINTMENT (OUTPATIENT)
Dept: OPHTHALMOLOGY | Facility: CLINIC | Age: 56
End: 2021-04-14

## 2021-04-14 ENCOUNTER — OUTPATIENT (OUTPATIENT)
Dept: OUTPATIENT SERVICES | Facility: HOSPITAL | Age: 56
LOS: 1 days | Discharge: HOME | End: 2021-04-14
Payer: COMMERCIAL

## 2021-04-14 DIAGNOSIS — H40.1131 PRIMARY OPEN-ANGLE GLAUCOMA, BILATERAL, MILD STAGE: ICD-10-CM

## 2021-04-14 DIAGNOSIS — Z98.890 OTHER SPECIFIED POSTPROCEDURAL STATES: Chronic | ICD-10-CM

## 2021-04-14 PROCEDURE — 92012 INTRM OPH EXAM EST PATIENT: CPT

## 2021-05-18 ENCOUNTER — OUTPATIENT (OUTPATIENT)
Dept: OUTPATIENT SERVICES | Facility: HOSPITAL | Age: 56
LOS: 1 days | Discharge: HOME | End: 2021-05-18

## 2021-05-18 ENCOUNTER — APPOINTMENT (OUTPATIENT)
Dept: DERMATOLOGY | Facility: CLINIC | Age: 56
End: 2021-05-18
Payer: COMMERCIAL

## 2021-05-18 DIAGNOSIS — L57.0 ACTINIC KERATOSIS: ICD-10-CM

## 2021-05-18 DIAGNOSIS — L98.9 DISORDER OF THE SKIN AND SUBCUTANEOUS TISSUE, UNSPECIFIED: ICD-10-CM

## 2021-05-18 DIAGNOSIS — Z98.890 OTHER SPECIFIED POSTPROCEDURAL STATES: Chronic | ICD-10-CM

## 2021-05-18 PROCEDURE — 17000 DESTRUCT PREMALG LESION: CPT | Mod: GC

## 2021-05-18 PROCEDURE — 99203 OFFICE O/P NEW LOW 30 MIN: CPT | Mod: GC,25

## 2021-05-18 PROCEDURE — 17003 DESTRUCT PREMALG LES 2-14: CPT | Mod: GC

## 2021-05-18 NOTE — ASSESSMENT
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

## 2021-05-18 NOTE — PHYSICAL EXAM
[Scalp] : Scalp [FreeTextEntry3] : rough, bumpy  keratotic white lesion on scalp measuring about 5mm and another about 2mm. Another present on the left arm about 3mm. Scalp appears very dry.

## 2021-05-18 NOTE — HISTORY OF PRESENT ILLNESS
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 Pt noticed this lesion about a year ago. Denies pain, bleeding and increase in size and presence any where else.  Pt endorses a family history of this lesion in his father and grandfather.

## 2021-06-14 ENCOUNTER — APPOINTMENT (OUTPATIENT)
Dept: HEMATOLOGY ONCOLOGY | Facility: CLINIC | Age: 56
End: 2021-06-14

## 2021-06-14 ENCOUNTER — OUTPATIENT (OUTPATIENT)
Dept: OUTPATIENT SERVICES | Facility: HOSPITAL | Age: 56
LOS: 1 days | Discharge: HOME | End: 2021-06-14

## 2021-06-14 ENCOUNTER — LABORATORY RESULT (OUTPATIENT)
Age: 56
End: 2021-06-14

## 2021-06-14 DIAGNOSIS — Z98.890 OTHER SPECIFIED POSTPROCEDURAL STATES: Chronic | ICD-10-CM

## 2021-06-14 DIAGNOSIS — E83.110 HEREDITARY HEMOCHROMATOSIS: ICD-10-CM

## 2021-06-15 ENCOUNTER — OUTPATIENT (OUTPATIENT)
Dept: OUTPATIENT SERVICES | Facility: HOSPITAL | Age: 56
LOS: 1 days | Discharge: HOME | End: 2021-06-15
Payer: COMMERCIAL

## 2021-06-15 ENCOUNTER — APPOINTMENT (OUTPATIENT)
Dept: OPHTHALMOLOGY | Facility: CLINIC | Age: 56
End: 2021-06-15

## 2021-06-15 DIAGNOSIS — Z98.890 OTHER SPECIFIED POSTPROCEDURAL STATES: Chronic | ICD-10-CM

## 2021-06-15 LAB
FERRITIN SERPL-MCNC: 77 NG/ML
HCT VFR BLD CALC: 42.6 %
HGB BLD-MCNC: 14.6 G/DL
IRON SATN MFR SERPL: 87 %
IRON SERPL-MCNC: 260 UG/DL
MCHC RBC-ENTMCNC: 33.3 PG
MCHC RBC-ENTMCNC: 34.3 G/DL
MCV RBC AUTO: 97 FL
PLATELET # BLD AUTO: 149 K/UL
PMV BLD: 10.3 FL
RBC # BLD: 4.39 M/UL
RBC # FLD: 12.1 %
TIBC SERPL-MCNC: 300 UG/DL
UIBC SERPL-MCNC: 40 UG/DL
WBC # FLD AUTO: 4.29 K/UL

## 2021-06-15 PROCEDURE — 92134 CPTRZ OPH DX IMG PST SGM RTA: CPT | Mod: 26

## 2021-06-15 PROCEDURE — 92014 COMPRE OPH EXAM EST PT 1/>: CPT

## 2021-06-15 PROCEDURE — 92202 OPSCPY EXTND ON/MAC DRAW: CPT

## 2021-06-17 DIAGNOSIS — H35.343 MACULAR CYST, HOLE, OR PSEUDOHOLE, BILATERAL: ICD-10-CM

## 2021-06-17 DIAGNOSIS — H02.88A MEIBOMIAN GLAND DYSFUNCTION RIGHT EYE, UPPER AND LOWER EYELIDS: ICD-10-CM

## 2021-06-17 DIAGNOSIS — H04.123 DRY EYE SYNDROME OF BILATERAL LACRIMAL GLANDS: ICD-10-CM

## 2021-06-17 DIAGNOSIS — H40.003 PREGLAUCOMA, UNSPECIFIED, BILATERAL: ICD-10-CM

## 2021-06-28 ENCOUNTER — APPOINTMENT (OUTPATIENT)
Dept: INTERNAL MEDICINE | Facility: CLINIC | Age: 56
End: 2021-06-28
Payer: COMMERCIAL

## 2021-06-28 ENCOUNTER — NON-APPOINTMENT (OUTPATIENT)
Age: 56
End: 2021-06-28

## 2021-06-28 ENCOUNTER — OUTPATIENT (OUTPATIENT)
Dept: OUTPATIENT SERVICES | Facility: HOSPITAL | Age: 56
LOS: 1 days | Discharge: HOME | End: 2021-06-28

## 2021-06-28 VITALS
DIASTOLIC BLOOD PRESSURE: 79 MMHG | HEART RATE: 92 BPM | WEIGHT: 162 LBS | HEIGHT: 69 IN | BODY MASS INDEX: 23.99 KG/M2 | TEMPERATURE: 98.1 F | SYSTOLIC BLOOD PRESSURE: 120 MMHG

## 2021-06-28 DIAGNOSIS — Z98.890 OTHER SPECIFIED POSTPROCEDURAL STATES: Chronic | ICD-10-CM

## 2021-06-28 PROCEDURE — 99214 OFFICE O/P EST MOD 30 MIN: CPT | Mod: GC

## 2021-06-29 DIAGNOSIS — E83.110 HEREDITARY HEMOCHROMATOSIS: ICD-10-CM

## 2021-06-29 DIAGNOSIS — G40.909 EPILEPSY, UNSPECIFIED, NOT INTRACTABLE, WITHOUT STATUS EPILEPTICUS: ICD-10-CM

## 2021-06-29 DIAGNOSIS — I10 ESSENTIAL (PRIMARY) HYPERTENSION: ICD-10-CM

## 2021-06-29 DIAGNOSIS — Z00.00 ENCOUNTER FOR GENERAL ADULT MEDICAL EXAMINATION WITHOUT ABNORMAL FINDINGS: ICD-10-CM

## 2021-06-29 DIAGNOSIS — E78.5 HYPERLIPIDEMIA, UNSPECIFIED: ICD-10-CM

## 2021-07-27 NOTE — ASSESSMENT
[FreeTextEntry1] : 55 M with past medical history of colonic tubular adenoma, DLD, Gout, Hemochromatosis, HTN, Schizophrenia and seizure disorder. He is here for a follow up visit\par \par # Gout \par - Stable on allopurinol , takes 400 mg daily\par - No recent joint pain \par \par # Actinic Keratosis of Scalp\par - s/p dermatology eval\par - c/w 5-FU Topical\par - f/u PRN w/Derm\par  \par \par # HereditaryHemochromatosis \par - Stable; ferritin-  177 ; goal ; s/p last phlebotomy December/Jan 2021\par - Continue to follow heme/onc ( Dr. Rogers)\par - A1C 5.1%  , TSH 1.98\par - Hemoglobin 14.6 June/14/21\par \par # Hypercholestrolemia\par - TG ~ 188, Chol ~ 205. HDL ~ 86 \par \par # Schizophrenia \par - Patient is on clozapine \par - WBC 4.13\par - No hallucinations or delusion\par - Appropriate appearance, mood, affect, and communication/interactions \par - Continue to follow Psychiatry \par \par # Seizure disorder\par - Stable on Depakote \par - No seizures in many years\par \par # HTN\par - Stable- well controlled on lisinopril 10 mg\par - Today 120/79\par \par # Colonic polyps\par - tubular adenoma transverse colon\par - f/u colonscopy in 3 years ( due in 2022) \par - following with GI \par \par # Health care maintenance \par - repeat colonoscopy due 2022\par - received flu shot at his pharmacy in 09/20\par - repeat A1C and Lipid profile 1 week before next visit\par - rtc 6 mos.

## 2021-07-27 NOTE — HISTORY OF PRESENT ILLNESS
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 He has been getting intermittent phlebotomies. He has not had phlebotomy now in several months. Was last seen by Heme/Onc in 3/2021 at which point per Dr. Cr if ferritin was not  further phlebotomy would be arranged. He presents to medicine clinic for followup evaluation. He states he is doing quite well. Was seen in May 2021 by Dr. Valdes of Dermatology for actinic keratosis of the scalp, Rx 5-FU cream and f/u.  He has no complaints. . He is here for a follow up visit. Pt has no active complaints. \par

## 2021-07-27 NOTE — REVIEW OF SYSTEMS
[Skin Rash] : skin rash [Fever] : no fever [Chills] : no chills [Discharge] : no discharge [Pain] : no pain [Redness] : no redness [Earache] : no earache [Hearing Loss] : no hearing loss [Nosebleeds] : no nosebleeds [Nasal Discharge] : no nasal discharge [Sore Throat] : no sore throat [Chest Pain] : no chest pain [Palpitations] : no palpitations [Claudication] : no  leg claudication [Orthopena] : no orthopnea [Shortness Of Breath] : no shortness of breath [Wheezing] : no wheezing [Cough] : no cough [Abdominal Pain] : no abdominal pain [Nausea] : no nausea [Constipation] : no constipation [Vomiting] : no vomiting [Joint Pain] : no joint pain [Joint Stiffness] : no joint stiffness [Muscle Pain] : no muscle pain [Back Pain] : no back pain [Itching] : no itching [Mole Changes] : no mole changes [Nail Changes] : no nail changes [Headache] : no headache [Dizziness] : no dizziness [Suicidal] : not suicidal [Insomnia] : no insomnia [Anxiety] : no anxiety [Depression] : no depression [de-identified] : whitish scales on scalp, non pruritic

## 2021-08-10 NOTE — ED ADULT NURSE NOTE - MUSCULOSKELETAL ASSESSMENT
----- Message from Shayy Solitario WDL

## 2021-08-16 ENCOUNTER — OUTPATIENT (OUTPATIENT)
Dept: OUTPATIENT SERVICES | Facility: HOSPITAL | Age: 56
LOS: 1 days | Discharge: HOME | End: 2021-08-16
Payer: COMMERCIAL

## 2021-08-16 ENCOUNTER — APPOINTMENT (OUTPATIENT)
Dept: OPHTHALMOLOGY | Facility: CLINIC | Age: 56
End: 2021-08-16

## 2021-08-16 DIAGNOSIS — Z98.890 OTHER SPECIFIED POSTPROCEDURAL STATES: Chronic | ICD-10-CM

## 2021-08-16 PROCEDURE — 92012 INTRM OPH EXAM EST PATIENT: CPT

## 2021-08-16 PROCEDURE — 92133 CPTRZD OPH DX IMG PST SGM ON: CPT | Mod: 26

## 2021-08-31 DIAGNOSIS — H40.1131 PRIMARY OPEN-ANGLE GLAUCOMA, BILATERAL, MILD STAGE: ICD-10-CM

## 2021-09-13 ENCOUNTER — APPOINTMENT (OUTPATIENT)
Dept: HEMATOLOGY ONCOLOGY | Facility: CLINIC | Age: 56
End: 2021-09-13

## 2021-09-13 ENCOUNTER — LABORATORY RESULT (OUTPATIENT)
Age: 56
End: 2021-09-13

## 2021-09-13 LAB
HCT VFR BLD CALC: 43.8 %
HGB BLD-MCNC: 15 G/DL
IRON SATN MFR SERPL: 75 %
IRON SERPL-MCNC: 240 UG/DL
MCHC RBC-ENTMCNC: 33.6 PG
MCHC RBC-ENTMCNC: 34.2 G/DL
MCV RBC AUTO: 98 FL
PLATELET # BLD AUTO: 166 K/UL
PMV BLD: 10.4 FL
RBC # BLD: 4.47 M/UL
RBC # FLD: 11.9 %
TIBC SERPL-MCNC: 322 UG/DL
UIBC SERPL-MCNC: 82 UG/DL
WBC # FLD AUTO: 4.6 K/UL

## 2021-09-14 LAB — FERRITIN SERPL-MCNC: 133 NG/ML

## 2021-09-20 ENCOUNTER — APPOINTMENT (OUTPATIENT)
Dept: INFUSION THERAPY | Facility: CLINIC | Age: 56
End: 2021-09-20

## 2021-09-20 ENCOUNTER — LABORATORY RESULT (OUTPATIENT)
Age: 56
End: 2021-09-20

## 2021-09-21 LAB
HCT VFR BLD CALC: 43.7 %
HGB BLD-MCNC: 15.3 G/DL
MCHC RBC-ENTMCNC: 34.2 PG
MCHC RBC-ENTMCNC: 35 G/DL
MCV RBC AUTO: 97.8 FL
PLATELET # BLD AUTO: 167 K/UL
PMV BLD: 10.2 FL
RBC # BLD: 4.47 M/UL
RBC # FLD: 11.8 %
WBC # FLD AUTO: 6.03 K/UL

## 2021-10-18 ENCOUNTER — APPOINTMENT (OUTPATIENT)
Dept: INFUSION THERAPY | Facility: CLINIC | Age: 56
End: 2021-10-18

## 2021-10-18 ENCOUNTER — LABORATORY RESULT (OUTPATIENT)
Age: 56
End: 2021-10-18

## 2021-10-18 LAB
HCT VFR BLD CALC: 40.9 %
HGB BLD-MCNC: 14.4 G/DL
MCHC RBC-ENTMCNC: 34.1 PG
MCHC RBC-ENTMCNC: 35.2 G/DL
MCV RBC AUTO: 96.9 FL
PLATELET # BLD AUTO: 155 K/UL
PMV BLD: 9.7 FL
RBC # BLD: 4.22 M/UL
RBC # FLD: 12 %
WBC # FLD AUTO: 4.62 K/UL

## 2021-10-19 LAB
FERRITIN SERPL-MCNC: 95 NG/ML
IRON SATN MFR SERPL: 70 %
IRON SERPL-MCNC: 236 UG/DL
TIBC SERPL-MCNC: 339 UG/DL
UIBC SERPL-MCNC: 103 UG/DL

## 2021-11-02 ENCOUNTER — APPOINTMENT (OUTPATIENT)
Dept: DERMATOLOGY | Facility: CLINIC | Age: 56
End: 2021-11-02
Payer: COMMERCIAL

## 2021-11-02 ENCOUNTER — OUTPATIENT (OUTPATIENT)
Dept: OUTPATIENT SERVICES | Facility: HOSPITAL | Age: 56
LOS: 1 days | Discharge: HOME | End: 2021-11-02

## 2021-11-02 DIAGNOSIS — Z98.890 OTHER SPECIFIED POSTPROCEDURAL STATES: Chronic | ICD-10-CM

## 2021-11-02 DIAGNOSIS — L57.8 OTHER SKIN CHANGES DUE TO CHRONIC EXPOSURE TO NONIONIZING RADIATION: ICD-10-CM

## 2021-11-02 PROCEDURE — 99212 OFFICE O/P EST SF 10 MIN: CPT | Mod: GC

## 2021-11-02 NOTE — PHYSICAL EXAM
[FreeTextEntry3] : Bald crown with scattered rough macules.\par No suspicious lesions on face or neck.\par Upper extremities  distal to elbows, few rough macules.\par Patient declines further exam.

## 2021-11-08 ENCOUNTER — APPOINTMENT (OUTPATIENT)
Dept: OPHTHALMOLOGY | Facility: CLINIC | Age: 56
End: 2021-11-08

## 2021-11-08 ENCOUNTER — OUTPATIENT (OUTPATIENT)
Dept: OUTPATIENT SERVICES | Facility: HOSPITAL | Age: 56
LOS: 1 days | Discharge: HOME | End: 2021-11-08
Payer: COMMERCIAL

## 2021-11-08 DIAGNOSIS — H40.1131 PRIMARY OPEN-ANGLE GLAUCOMA, BILATERAL, MILD STAGE: ICD-10-CM

## 2021-11-08 DIAGNOSIS — Z98.890 OTHER SPECIFIED POSTPROCEDURAL STATES: Chronic | ICD-10-CM

## 2021-11-08 PROCEDURE — 92083 EXTENDED VISUAL FIELD XM: CPT | Mod: 26

## 2021-11-11 ENCOUNTER — APPOINTMENT (OUTPATIENT)
Dept: INTERNAL MEDICINE | Facility: CLINIC | Age: 56
End: 2021-11-11
Payer: COMMERCIAL

## 2021-11-11 ENCOUNTER — OUTPATIENT (OUTPATIENT)
Dept: OUTPATIENT SERVICES | Facility: HOSPITAL | Age: 56
LOS: 1 days | Discharge: HOME | End: 2021-11-11
Payer: COMMERCIAL

## 2021-11-11 ENCOUNTER — NON-APPOINTMENT (OUTPATIENT)
Age: 56
End: 2021-11-11

## 2021-11-11 VITALS
OXYGEN SATURATION: 99 % | HEART RATE: 82 BPM | TEMPERATURE: 97.7 F | HEIGHT: 69 IN | BODY MASS INDEX: 25.18 KG/M2 | DIASTOLIC BLOOD PRESSURE: 82 MMHG | WEIGHT: 170 LBS | SYSTOLIC BLOOD PRESSURE: 125 MMHG

## 2021-11-11 DIAGNOSIS — L57.8 OTHER SKIN CHANGES DUE TO CHRONIC EXPOSURE TO NONIONIZING RADIATION: ICD-10-CM

## 2021-11-11 DIAGNOSIS — Z87.2 PERSONAL HISTORY OF DISEASES OF THE SKIN AND SUBCUTANEOUS TISSUE: ICD-10-CM

## 2021-11-11 DIAGNOSIS — Z86.010 PERSONAL HISTORY OF COLONIC POLYPS: ICD-10-CM

## 2021-11-11 DIAGNOSIS — R63.4 ABNORMAL WEIGHT LOSS: ICD-10-CM

## 2021-11-11 DIAGNOSIS — Z98.890 OTHER SPECIFIED POSTPROCEDURAL STATES: Chronic | ICD-10-CM

## 2021-11-11 DIAGNOSIS — S93.609A UNSPECIFIED SPRAIN OF UNSPECIFIED FOOT, INITIAL ENCOUNTER: ICD-10-CM

## 2021-11-11 DIAGNOSIS — L98.9 DISORDER OF THE SKIN AND SUBCUTANEOUS TISSUE, UNSPECIFIED: ICD-10-CM

## 2021-11-11 DIAGNOSIS — S93.601A UNSPECIFIED SPRAIN OF RIGHT FOOT, INITIAL ENCOUNTER: ICD-10-CM

## 2021-11-11 PROCEDURE — 93010 ELECTROCARDIOGRAM REPORT: CPT

## 2021-11-11 PROCEDURE — 99214 OFFICE O/P EST MOD 30 MIN: CPT | Mod: GC

## 2021-11-15 ENCOUNTER — APPOINTMENT (OUTPATIENT)
Dept: INFUSION THERAPY | Facility: CLINIC | Age: 56
End: 2021-11-15
Payer: COMMERCIAL

## 2021-11-15 ENCOUNTER — APPOINTMENT (OUTPATIENT)
Dept: HEMATOLOGY ONCOLOGY | Facility: CLINIC | Age: 56
End: 2021-11-15
Payer: COMMERCIAL

## 2021-11-15 ENCOUNTER — OUTPATIENT (OUTPATIENT)
Dept: OUTPATIENT SERVICES | Facility: HOSPITAL | Age: 56
LOS: 1 days | Discharge: HOME | End: 2021-11-15

## 2021-11-15 ENCOUNTER — LABORATORY RESULT (OUTPATIENT)
Age: 56
End: 2021-11-15

## 2021-11-15 VITALS
BODY MASS INDEX: 25.48 KG/M2 | RESPIRATION RATE: 16 BRPM | DIASTOLIC BLOOD PRESSURE: 79 MMHG | HEIGHT: 69 IN | TEMPERATURE: 98.4 F | WEIGHT: 172 LBS | SYSTOLIC BLOOD PRESSURE: 127 MMHG

## 2021-11-15 DIAGNOSIS — Z98.890 OTHER SPECIFIED POSTPROCEDURAL STATES: Chronic | ICD-10-CM

## 2021-11-15 DIAGNOSIS — E83.110 HEREDITARY HEMOCHROMATOSIS: ICD-10-CM

## 2021-11-15 LAB
HCT VFR BLD CALC: 42.5 %
HGB BLD-MCNC: 15 G/DL
MCHC RBC-ENTMCNC: 34.2 PG
MCHC RBC-ENTMCNC: 35.3 G/DL
MCV RBC AUTO: 96.8 FL
PLATELET # BLD AUTO: 150 K/UL
PMV BLD: 11.3 FL
RBC # BLD: 4.39 M/UL
RBC # FLD: 11.9 %
WBC # FLD AUTO: 5.74 K/UL

## 2021-11-15 PROCEDURE — 99213 OFFICE O/P EST LOW 20 MIN: CPT

## 2021-11-15 NOTE — HISTORY OF PRESENT ILLNESS
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  He also had a liver biopsy, consistent with iron overload.  He has been getting intermittent phlebotomies.  He has not had phlebotomy now in several months.  His most recent ferritin was from 08/16/2016; it demonstrated a ferritin of 58.  He now presents for followup evaluation.  He states he is doing quite well.  He has no complaints.\par \par HCM\par Next Colonoscopy due in Nov 2022 [de-identified] : 1/23/17\par He presents for follow up. His ferritin in Oct was 101 and he underwent a phlebotomy of 1 unit. HIs  AFP was 7 and ultrasound of the liver was normal in November. He comes in for follow up. Hs no complaints.\par \par 4/19/17\par He presents for follow up. Last ferritin  was under 100.  He has no complaints,\par \par 7/18/17\par No events. Denies weight loss, fevers. No new meds. Last AFP an ferritin were WNL\par \par 10/31/17\par He is doing well. He had a phlebotomyin September and October. No complaints\par \par 1/23/18\par He is here for follow up for hemochromatisis. He has no complaints. He had  CT abd/Pelvis this month that was normal. His Ferretin from this month is in the 60s.\par \par 04/24/2018\par Patient is here for scheduled follow up visit. He denies any complaints. His last ferritin(03/2018) was 81.\par \par 8/14/18\par He is here for follow up.  HIs ferritin from 8/10 was 98. He has no complaints. Last AFP 2.5 in April\par \par 2/12/19\par He is here for follow up.  He had US in 8/2018 by Dr. Aviles IMPRESSION: 1.  Bilateral renal cysts.  2.  Pancreas is mostly obscured by overlying bowel gas and cannot be  assessed.  3.  Otherwise, unremarkable abdominal ultrasound.\par Last Ferritin was 90 in 12/2018.\par \par He is doing well has no complaints. \par \par 8/13/19\par He is here for follow up he had a colonoscopy by Dr. Aviles: 8/3/19  Polyp (5 mm) in the transverse colon. (Polypectomy). \par  Polyp (1 cm) in the transverse colon. (Polypectomy). \par  Polyp (5 mm) in the hepatic flexure. (Polypectomy). \par  Otherwise normal colon. \par Plan: Colonoscopy in 3 years. \par Path was negative. Last ferritin in may was 94.\par \par He has no complaints\par \par 2/10/2020\par Patient is here for a follow-up visit for hereditary hemochromatosis.  He is feeling well with no new complaints.  Most recent CBC is stable.  Ferritin from 1.17.2020 down to 109.  He was phlebotomized that same day.    \par US Abd (12.6.2019) IMPRESSION:Unremarkable right upper quadrant ultrasound.\par \par 5/11/2020\par He is doing well he has no compalitns. \par \par \par 9/21/20\par He is here for follow up. His ferritin was at goal earlier this month. He has no complaits.\par \par \par 3/15/21\par He is here for follow up feels well. He had phlebotomy in Januray 2021. Ferritin was 166 in December 2020\par \par \par 11/15/21\par He is here for follow up. Since last visit his ferritin was as high as 133 in 9/21 and he was restarted on Phlebotomy  with last on 10/18/21 and ferritin was 95. CNC f From today showed normal CBC. Last CMP was  normal. He has no complaints today.

## 2021-11-17 DIAGNOSIS — E78.5 HYPERLIPIDEMIA, UNSPECIFIED: ICD-10-CM

## 2021-11-17 DIAGNOSIS — M10.9 GOUT, UNSPECIFIED: ICD-10-CM

## 2021-11-17 DIAGNOSIS — G40.909 EPILEPSY, UNSPECIFIED, NOT INTRACTABLE, WITHOUT STATUS EPILEPTICUS: ICD-10-CM

## 2021-11-17 DIAGNOSIS — I10 ESSENTIAL (PRIMARY) HYPERTENSION: ICD-10-CM

## 2021-11-17 DIAGNOSIS — F20.9 SCHIZOPHRENIA, UNSPECIFIED: ICD-10-CM

## 2021-11-17 DIAGNOSIS — E83.110 HEREDITARY HEMOCHROMATOSIS: ICD-10-CM

## 2021-11-17 DIAGNOSIS — F41.9 ANXIETY DISORDER, UNSPECIFIED: ICD-10-CM

## 2021-11-17 DIAGNOSIS — Z00.00 ENCOUNTER FOR GENERAL ADULT MEDICAL EXAMINATION WITHOUT ABNORMAL FINDINGS: ICD-10-CM

## 2021-11-26 NOTE — REVIEW OF SYSTEMS
[Skin Rash] : skin rash [Fever] : no fever [Chills] : no chills [Discharge] : no discharge [Pain] : no pain [Redness] : no redness [Earache] : no earache [Hearing Loss] : no hearing loss [Nosebleeds] : no nosebleeds [Nasal Discharge] : no nasal discharge [Sore Throat] : no sore throat [Chest Pain] : no chest pain [Palpitations] : no palpitations [Claudication] : no  leg claudication [Orthopena] : no orthopnea [Shortness Of Breath] : no shortness of breath [Wheezing] : no wheezing [Cough] : no cough [Abdominal Pain] : no abdominal pain [Nausea] : no nausea [Constipation] : no constipation [Vomiting] : no vomiting [Joint Pain] : no joint pain [Joint Stiffness] : no joint stiffness [Muscle Pain] : no muscle pain [Back Pain] : no back pain [Itching] : no itching [Mole Changes] : no mole changes [Nail Changes] : no nail changes [Headache] : no headache [Dizziness] : no dizziness [Suicidal] : not suicidal [Insomnia] : no insomnia [Anxiety] : no anxiety [Depression] : no depression [de-identified] : whitish scales on scalp, non pruritic

## 2021-11-26 NOTE — HISTORY OF PRESENT ILLNESS
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 He has been getting intermittent phlebotomies, next one scheduled for next week. No acute complaints this visit.\par

## 2021-11-26 NOTE — ASSESSMENT
[FreeTextEntry1] : 55 M with past medical history of colonic tubular adenoma, DLD, Gout, Hemochromatosis, HTN, Schizophrenia and seizure disorder. He is here for a follow up visit\par \par # Gout \par - Stable on allopurinol , takes 400 mg daily\par - No recent joint pain \par \par # Hereditary Hemochromatosis \par - follows w/ Dr Rogers\par - next phlebotomy session scheduled for next week\par \par # Hypercholestrolemia\par - TG 68, Chol 230, HDL 95, LDL-C 129\par \par # Schizophrenia \par - Patient is on clozapine \par - WBC 4.13\par - No hallucinations or delusion\par - Appropriate appearance, mood, affect, and communication/interactions \par - Continue to follow Psychiatry \par \par # Seizure disorder\par - Stable on Depakote \par - No seizures in many years\par \par # HTN\par - /82 in office today\par - Well controlled on lisinopril 10 mg\par \par # Colonic polyps\par - tubular adenoma transverse colon\par - f/u colonscopy in 3 years ( due in 2022) \par - following with GI \par \par # HCM\par - Colonoscopy: due 2022\par - COVID: \par - Flu: \par - Routine labs before next visit\par - RTC 6 motnhs

## 2021-11-26 NOTE — PHYSICAL EXAM
[No Acute Distress] : no acute distress [Well Nourished] : well nourished [Well Developed] : well developed [Well-Appearing] : well-appearing [Normal Sclera/Conjunctiva] : normal sclera/conjunctiva [PERRL] : pupils equal round and reactive to light [EOMI] : extraocular movements intact [Normal Outer Ear/Nose] : the outer ears and nose were normal in appearance [Normal Oropharynx] : the oropharynx was normal [Normal TMs] : both tympanic membranes were normal [No JVD] : no jugular venous distention [No Lymphadenopathy] : no lymphadenopathy [Supple] : supple [No Respiratory Distress] : no respiratory distress  [No Accessory Muscle Use] : no accessory muscle use [Clear to Auscultation] : lungs were clear to auscultation bilaterally [Normal Rate] : normal rate  [Regular Rhythm] : with a regular rhythm [No Carotid Bruits] : no carotid bruits [No Abdominal Bruit] : a ~M bruit was not heard ~T in the abdomen [No Varicosities] : no varicosities [No Edema] : there was no peripheral edema [Soft] : abdomen soft [Non Tender] : non-tender [Non-distended] : non-distended [No HSM] : no HSM [Normal Supraclavicular Nodes] : no supraclavicular lymphadenopathy [Normal Anterior Cervical Nodes] : no anterior cervical lymphadenopathy [No CVA Tenderness] : no CVA  tenderness [No Spinal Tenderness] : no spinal tenderness [No Joint Swelling] : no joint swelling [Grossly Normal Strength/Tone] : grossly normal strength/tone [No Rash] : no rash [Coordination Grossly Intact] : coordination grossly intact [No Focal Deficits] : no focal deficits [Stool Occult Blood] : stool negative for occult blood

## 2021-12-14 ENCOUNTER — APPOINTMENT (OUTPATIENT)
Dept: OPHTHALMOLOGY | Facility: CLINIC | Age: 56
End: 2021-12-14

## 2021-12-14 ENCOUNTER — OUTPATIENT (OUTPATIENT)
Dept: OUTPATIENT SERVICES | Facility: HOSPITAL | Age: 56
LOS: 1 days | Discharge: HOME | End: 2021-12-14
Payer: COMMERCIAL

## 2021-12-14 DIAGNOSIS — Z98.890 OTHER SPECIFIED POSTPROCEDURAL STATES: Chronic | ICD-10-CM

## 2021-12-14 PROCEDURE — 92202 OPSCPY EXTND ON/MAC DRAW: CPT

## 2021-12-14 PROCEDURE — 92014 COMPRE OPH EXAM EST PT 1/>: CPT

## 2021-12-21 ENCOUNTER — APPOINTMENT (OUTPATIENT)
Dept: INTERNAL MEDICINE | Facility: CLINIC | Age: 56
End: 2021-12-21

## 2021-12-28 ENCOUNTER — APPOINTMENT (OUTPATIENT)
Dept: INTERNAL MEDICINE | Facility: CLINIC | Age: 56
End: 2021-12-28

## 2022-02-08 ENCOUNTER — OUTPATIENT (OUTPATIENT)
Dept: OUTPATIENT SERVICES | Facility: HOSPITAL | Age: 57
LOS: 1 days | Discharge: HOME | End: 2022-02-08
Payer: COMMERCIAL

## 2022-02-08 ENCOUNTER — APPOINTMENT (OUTPATIENT)
Dept: OPHTHALMOLOGY | Facility: CLINIC | Age: 57
End: 2022-02-08

## 2022-02-08 DIAGNOSIS — H04.123 DRY EYE SYNDROME OF BILATERAL LACRIMAL GLANDS: ICD-10-CM

## 2022-02-08 DIAGNOSIS — H40.1131 PRIMARY OPEN-ANGLE GLAUCOMA, BILATERAL, MILD STAGE: ICD-10-CM

## 2022-02-08 DIAGNOSIS — Z98.890 OTHER SPECIFIED POSTPROCEDURAL STATES: Chronic | ICD-10-CM

## 2022-02-08 PROCEDURE — 92133 CPTRZD OPH DX IMG PST SGM ON: CPT | Mod: 26

## 2022-02-08 PROCEDURE — 92012 INTRM OPH EXAM EST PATIENT: CPT

## 2022-02-15 ENCOUNTER — OUTPATIENT (OUTPATIENT)
Dept: OUTPATIENT SERVICES | Facility: HOSPITAL | Age: 57
LOS: 1 days | Discharge: HOME | End: 2022-02-15

## 2022-02-15 ENCOUNTER — APPOINTMENT (OUTPATIENT)
Dept: HEMATOLOGY ONCOLOGY | Facility: CLINIC | Age: 57
End: 2022-02-15

## 2022-02-15 ENCOUNTER — LABORATORY RESULT (OUTPATIENT)
Age: 57
End: 2022-02-15

## 2022-02-15 DIAGNOSIS — Z98.890 OTHER SPECIFIED POSTPROCEDURAL STATES: Chronic | ICD-10-CM

## 2022-02-15 LAB
HCT VFR BLD CALC: 41.9 %
HGB BLD-MCNC: 14.5 G/DL
MCHC RBC-ENTMCNC: 33.2 PG
MCHC RBC-ENTMCNC: 34.6 G/DL
MCV RBC AUTO: 95.9 FL
PLATELET # BLD AUTO: 160 K/UL
PMV BLD: 10 FL
RBC # BLD: 4.37 M/UL
RBC # FLD: 12.2 %
WBC # FLD AUTO: 5.21 K/UL

## 2022-02-16 LAB
FERRITIN SERPL-MCNC: 64 NG/ML
IRON SATN MFR SERPL: 77 %
IRON SERPL-MCNC: 259 UG/DL
TIBC SERPL-MCNC: 337 UG/DL
UIBC SERPL-MCNC: 78 UG/DL

## 2022-02-17 DIAGNOSIS — E83.110 HEREDITARY HEMOCHROMATOSIS: ICD-10-CM

## 2022-05-11 ENCOUNTER — LABORATORY RESULT (OUTPATIENT)
Age: 57
End: 2022-05-11

## 2022-05-11 ENCOUNTER — APPOINTMENT (OUTPATIENT)
Dept: HEMATOLOGY ONCOLOGY | Facility: CLINIC | Age: 57
End: 2022-05-11
Payer: COMMERCIAL

## 2022-05-11 VITALS
DIASTOLIC BLOOD PRESSURE: 71 MMHG | RESPIRATION RATE: 16 BRPM | HEIGHT: 69 IN | TEMPERATURE: 97.9 F | SYSTOLIC BLOOD PRESSURE: 110 MMHG | HEART RATE: 90 BPM

## 2022-05-11 LAB
HCT VFR BLD CALC: 42.2 %
HGB BLD-MCNC: 14.8 G/DL
MCHC RBC-ENTMCNC: 33.9 PG
MCHC RBC-ENTMCNC: 35.1 G/DL
MCV RBC AUTO: 96.6 FL
PLATELET # BLD AUTO: 165 K/UL
PMV BLD: 9.9 FL
RBC # BLD: 4.37 M/UL
RBC # FLD: 12.1 %
WBC # FLD AUTO: 5.83 K/UL

## 2022-05-11 PROCEDURE — 99213 OFFICE O/P EST LOW 20 MIN: CPT

## 2022-05-11 NOTE — END OF VISIT
[FreeTextEntry3] : I was physically present for the key portions of the evaluation and management service provided.  I agree with the history and physical, and plan which I have reviewed and edited where appropriate.\par \par Will check CBC abd ferritin  will arrange for phlebotomy if needed. RTC and blood work arranged.

## 2022-05-11 NOTE — ASSESSMENT
[FreeTextEntry1] :  This is a 56 year old male with history of compound heterozygous mutation, HFE gene for C282Y and H63D mutation, with liver biopsy consistent with iron overload.  The patient has been getting phlebotomies as needed.\par - Labwork today: CBC, iron studies, ferritin level\par - will keep ferritin 50 to 100; if his levels go over threshold we will call to arrange for more phlebotomy\par - US abdomen has no evidence of cirrhosis ; last done 12/2019 [facility]\par \par Labwork in 3 months for CBC, iron studies, ferritin level\par RTC in 6 months with CBC, iron studies, ferritin level

## 2022-05-12 ENCOUNTER — APPOINTMENT (OUTPATIENT)
Dept: INTERNAL MEDICINE | Facility: CLINIC | Age: 57
End: 2022-05-12

## 2022-05-12 LAB
FERRITIN SERPL-MCNC: 115 NG/ML
IRON SATN MFR SERPL: 65 %
IRON SERPL-MCNC: 220 UG/DL
TIBC SERPL-MCNC: 341 UG/DL
UIBC SERPL-MCNC: 121 UG/DL

## 2022-05-25 LAB
CHOLEST SERPL-MCNC: 230 MG/DL
ESTIMATED AVERAGE GLUCOSE: 97 MG/DL
HBA1C MFR BLD HPLC: 5 %
HDLC SERPL-MCNC: 95 MG/DL
LDLC SERPL CALC-MCNC: 129 MG/DL
M TB IFN-G BLD-IMP: NEGATIVE
NONHDLC SERPL-MCNC: 135 MG/DL
QUANTIFERON TB PLUS MITOGEN MINUS NIL: 0.62 IU/ML
QUANTIFERON TB PLUS NIL: 0.01 IU/ML
QUANTIFERON TB PLUS TB1 MINUS NIL: 0 IU/ML
QUANTIFERON TB PLUS TB2 MINUS NIL: 0 IU/ML
TRIGL SERPL-MCNC: 68 MG/DL

## 2022-05-27 ENCOUNTER — LABORATORY RESULT (OUTPATIENT)
Age: 57
End: 2022-05-27

## 2022-05-27 ENCOUNTER — APPOINTMENT (OUTPATIENT)
Dept: INFUSION THERAPY | Facility: CLINIC | Age: 57
End: 2022-05-27

## 2022-05-27 LAB
HCT VFR BLD CALC: 41.5 %
HGB BLD-MCNC: 15.1 G/DL
MCHC RBC-ENTMCNC: 34.9 PG
MCHC RBC-ENTMCNC: 36.4 G/DL
MCV RBC AUTO: 95.8 FL
PLATELET # BLD AUTO: 147 K/UL
PMV BLD: 10.9 FL
RBC # BLD: 4.33 M/UL
RBC # FLD: 12 %
WBC # FLD AUTO: 5.54 K/UL

## 2022-06-14 ENCOUNTER — APPOINTMENT (OUTPATIENT)
Dept: OPHTHALMOLOGY | Facility: CLINIC | Age: 57
End: 2022-06-14

## 2022-06-14 ENCOUNTER — OUTPATIENT (OUTPATIENT)
Dept: OUTPATIENT SERVICES | Facility: HOSPITAL | Age: 57
LOS: 1 days | Discharge: HOME | End: 2022-06-14

## 2022-06-14 DIAGNOSIS — H31.023 SOLAR RETINOPATHY, BILATERAL: ICD-10-CM

## 2022-06-14 DIAGNOSIS — Z98.890 OTHER SPECIFIED POSTPROCEDURAL STATES: Chronic | ICD-10-CM

## 2022-06-14 DIAGNOSIS — H40.1490 CAPSULAR GLAUCOMA WITH PSEUDOEXFOLIATION OF LENS, UNSPECIFIED EYE, STAGE UNSPECIFIED: ICD-10-CM

## 2022-06-14 PROCEDURE — 92014 COMPRE OPH EXAM EST PT 1/>: CPT

## 2022-06-21 ENCOUNTER — APPOINTMENT (OUTPATIENT)
Dept: INFUSION THERAPY | Facility: CLINIC | Age: 57
End: 2022-06-21

## 2022-06-28 ENCOUNTER — APPOINTMENT (OUTPATIENT)
Dept: INFUSION THERAPY | Facility: CLINIC | Age: 57
End: 2022-06-28

## 2022-06-28 ENCOUNTER — LABORATORY RESULT (OUTPATIENT)
Age: 57
End: 2022-06-28

## 2022-06-29 LAB
HCT VFR BLD CALC: 40.8 %
HGB BLD-MCNC: 14.4 G/DL
IRON SERPL-MCNC: 202 UG/DL
MCHC RBC-ENTMCNC: 35 PG
MCHC RBC-ENTMCNC: 35.3 G/DL
MCV RBC AUTO: 99.3 FL
PLATELET # BLD AUTO: 174 K/UL
PMV BLD: 10.7 FL
RBC # BLD: 4.11 M/UL
RBC # FLD: 12.1 %
WBC # FLD AUTO: 5.52 K/UL

## 2022-06-30 LAB — FERRITIN SERPL-MCNC: 106 NG/ML

## 2022-08-03 ENCOUNTER — APPOINTMENT (OUTPATIENT)
Dept: HEMATOLOGY ONCOLOGY | Facility: CLINIC | Age: 57
End: 2022-08-03

## 2022-08-03 ENCOUNTER — LABORATORY RESULT (OUTPATIENT)
Age: 57
End: 2022-08-03

## 2022-08-04 LAB
FERRITIN SERPL-MCNC: 70 NG/ML
HCT VFR BLD CALC: 39.4 %
HGB BLD-MCNC: 13.7 G/DL
IRON SATN MFR SERPL: 60 %
IRON SERPL-MCNC: 190 UG/DL
MCHC RBC-ENTMCNC: 33.9 PG
MCHC RBC-ENTMCNC: 34.8 G/DL
MCV RBC AUTO: 97.5 FL
PLATELET # BLD AUTO: 158 K/UL
PMV BLD: 9.6 FL
RBC # BLD: 4.04 M/UL
RBC # FLD: 11.9 %
TIBC SERPL-MCNC: 317 UG/DL
UIBC SERPL-MCNC: 127 UG/DL
WBC # FLD AUTO: 4.84 K/UL

## 2022-08-09 ENCOUNTER — OUTPATIENT (OUTPATIENT)
Dept: OUTPATIENT SERVICES | Facility: HOSPITAL | Age: 57
LOS: 1 days | Discharge: HOME | End: 2022-08-09

## 2022-08-09 ENCOUNTER — APPOINTMENT (OUTPATIENT)
Dept: INTERNAL MEDICINE | Facility: CLINIC | Age: 57
End: 2022-08-09

## 2022-08-09 ENCOUNTER — NON-APPOINTMENT (OUTPATIENT)
Age: 57
End: 2022-08-09

## 2022-08-09 VITALS
HEIGHT: 69 IN | OXYGEN SATURATION: 97 % | SYSTOLIC BLOOD PRESSURE: 105 MMHG | DIASTOLIC BLOOD PRESSURE: 72 MMHG | TEMPERATURE: 97 F | HEART RATE: 92 BPM

## 2022-08-09 DIAGNOSIS — Z98.890 OTHER SPECIFIED POSTPROCEDURAL STATES: Chronic | ICD-10-CM

## 2022-08-09 LAB
ALBUMIN SERPL ELPH-MCNC: 5 G/DL
ALP BLD-CCNC: 57 U/L
ALT SERPL-CCNC: 21 U/L
ANION GAP SERPL CALC-SCNC: 9 MMOL/L
AST SERPL-CCNC: 27 U/L
BASOPHILS # BLD AUTO: 0.04 K/UL
BASOPHILS NFR BLD AUTO: 0.9 %
BILIRUB SERPL-MCNC: 0.8 MG/DL
BUN SERPL-MCNC: 16 MG/DL
CALCIUM SERPL-MCNC: 9.7 MG/DL
CHLORIDE SERPL-SCNC: 100 MMOL/L
CHOLEST SERPL-MCNC: 244 MG/DL
CO2 SERPL-SCNC: 29 MMOL/L
CREAT SERPL-MCNC: 0.9 MG/DL
EGFR: 100 ML/MIN/1.73M2
EOSINOPHIL # BLD AUTO: 0.05 K/UL
EOSINOPHIL NFR BLD AUTO: 1.1 %
ESTIMATED AVERAGE GLUCOSE: 100 MG/DL
GLUCOSE SERPL-MCNC: 86 MG/DL
HBA1C MFR BLD HPLC: 5.1 %
HCT VFR BLD CALC: 44.2 %
HDLC SERPL-MCNC: 82 MG/DL
HGB BLD-MCNC: 14.9 G/DL
IMM GRANULOCYTES NFR BLD AUTO: 0.2 %
LDLC SERPL CALC-MCNC: 142 MG/DL
LYMPHOCYTES # BLD AUTO: 1.8 K/UL
LYMPHOCYTES NFR BLD AUTO: 40.9 %
MAN DIFF?: NORMAL
MCHC RBC-ENTMCNC: 33.7 G/DL
MCHC RBC-ENTMCNC: 34.3 PG
MCV RBC AUTO: 101.8 FL
MONOCYTES # BLD AUTO: 0.61 K/UL
MONOCYTES NFR BLD AUTO: 13.9 %
NEUTROPHILS # BLD AUTO: 1.89 K/UL
NEUTROPHILS NFR BLD AUTO: 43 %
NONHDLC SERPL-MCNC: 162 MG/DL
PLATELET # BLD AUTO: 175 K/UL
POTASSIUM SERPL-SCNC: 4.4 MMOL/L
PROT SERPL-MCNC: 6.6 G/DL
RBC # BLD: 4.34 M/UL
RBC # FLD: 12.1 %
SODIUM SERPL-SCNC: 138 MMOL/L
TRIGL SERPL-MCNC: 99 MG/DL
TSH SERPL-ACNC: 2.47 UIU/ML
WBC # FLD AUTO: 4.4 K/UL

## 2022-08-09 PROCEDURE — 99213 OFFICE O/P EST LOW 20 MIN: CPT | Mod: GC

## 2022-08-09 NOTE — ASSESSMENT
[FreeTextEntry1] : 56 M with past medical history of colonic tubular adenoma, DLD, Gout, Hemochromatosis, HTN, Schizophrenia and seizure disorder\par \par # Gout \par -  on allopurinol , takes 400 mg daily\par - No recent joint pain \par \par # Hereditary Hemochromatosis \par - follows w/ Dr Rogers\par \par # Hypercholestrolemia\par - dietician referral given\par \par # Schizophrenia \par - Patient is on clozapine \par -  follow Psychiatry \par \par # Seizure disorder\par - Stable on Depakote \par - No seizures in many years\par \par # HTN\par - controlled on lisinopril 10 mg\par \par # Colonic polyps\par - tubular adenoma transverse colon\par - f/u colonscopy, will give referral next visit\par \par # HCM\par - Colonoscopy give referral next visit\par - Routine labs before next visit\par - RTC 6 mo or prn\par

## 2022-08-09 NOTE — PHYSICAL EXAM
[No Acute Distress] : no acute distress [Normal Outer Ear/Nose] : the outer ears and nose were normal in appearance [No Respiratory Distress] : no respiratory distress  [Normal Rate] : normal rate  [No Edema] : there was no peripheral edema [Soft] : abdomen soft [No HSM] : no HSM [No Joint Swelling] : no joint swelling [No Rash] : no rash

## 2022-08-09 NOTE — HISTORY OF PRESENT ILLNESS
[FreeTextEntry1] : f/u htn [de-identified] : here for htn f/u, denies complaints\par takes pills daily\par denies joint pains

## 2022-08-12 ENCOUNTER — APPOINTMENT (OUTPATIENT)
Dept: OPHTHALMOLOGY | Facility: CLINIC | Age: 57
End: 2022-08-12

## 2022-08-12 ENCOUNTER — OUTPATIENT (OUTPATIENT)
Dept: OUTPATIENT SERVICES | Facility: HOSPITAL | Age: 57
LOS: 1 days | Discharge: HOME | End: 2022-08-12

## 2022-08-12 DIAGNOSIS — H40.1490 CAPSULAR GLAUCOMA WITH PSEUDOEXFOLIATION OF LENS, UNSPECIFIED EYE, STAGE UNSPECIFIED: ICD-10-CM

## 2022-08-12 DIAGNOSIS — E78.5 HYPERLIPIDEMIA, UNSPECIFIED: ICD-10-CM

## 2022-08-12 DIAGNOSIS — Z98.890 OTHER SPECIFIED POSTPROCEDURAL STATES: Chronic | ICD-10-CM

## 2022-08-12 DIAGNOSIS — I10 ESSENTIAL (PRIMARY) HYPERTENSION: ICD-10-CM

## 2022-08-12 PROCEDURE — 92083 EXTENDED VISUAL FIELD XM: CPT | Mod: 26

## 2022-08-12 PROCEDURE — 92012 INTRM OPH EXAM EST PATIENT: CPT

## 2022-08-16 ENCOUNTER — APPOINTMENT (OUTPATIENT)
Dept: INFUSION THERAPY | Facility: CLINIC | Age: 57
End: 2022-08-16

## 2022-08-16 ENCOUNTER — OUTPATIENT (OUTPATIENT)
Dept: OUTPATIENT SERVICES | Facility: HOSPITAL | Age: 57
LOS: 1 days | Discharge: HOME | End: 2022-08-16

## 2022-08-16 ENCOUNTER — LABORATORY RESULT (OUTPATIENT)
Age: 57
End: 2022-08-16

## 2022-08-16 DIAGNOSIS — E83.110 HEREDITARY HEMOCHROMATOSIS: ICD-10-CM

## 2022-08-16 DIAGNOSIS — Z98.890 OTHER SPECIFIED POSTPROCEDURAL STATES: Chronic | ICD-10-CM

## 2022-08-16 LAB
HCT VFR BLD CALC: 39.8 %
HGB BLD-MCNC: 14.1 G/DL
MCHC RBC-ENTMCNC: 34.1 PG
MCHC RBC-ENTMCNC: 35.4 G/DL
MCV RBC AUTO: 96.4 FL
PLATELET # BLD AUTO: 153 K/UL
PMV BLD: 10.1 FL
RBC # BLD: 4.13 M/UL
RBC # FLD: 11.9 %
WBC # FLD AUTO: 5.04 K/UL

## 2022-08-17 ENCOUNTER — APPOINTMENT (OUTPATIENT)
Dept: HEMATOLOGY ONCOLOGY | Facility: CLINIC | Age: 57
End: 2022-08-17

## 2022-09-29 ENCOUNTER — APPOINTMENT (OUTPATIENT)
Dept: NUTRITION | Facility: CLINIC | Age: 57
End: 2022-09-29

## 2022-09-29 ENCOUNTER — OUTPATIENT (OUTPATIENT)
Dept: OUTPATIENT SERVICES | Facility: HOSPITAL | Age: 57
LOS: 1 days | Discharge: HOME | End: 2022-09-29

## 2022-09-29 DIAGNOSIS — Z98.890 OTHER SPECIFIED POSTPROCEDURAL STATES: Chronic | ICD-10-CM

## 2022-11-08 ENCOUNTER — APPOINTMENT (OUTPATIENT)
Dept: DERMATOLOGY | Facility: CLINIC | Age: 57
End: 2022-11-08

## 2022-11-08 ENCOUNTER — OUTPATIENT (OUTPATIENT)
Dept: OUTPATIENT SERVICES | Facility: HOSPITAL | Age: 57
LOS: 1 days | Discharge: HOME | End: 2022-11-08

## 2022-11-08 DIAGNOSIS — Z98.890 OTHER SPECIFIED POSTPROCEDURAL STATES: Chronic | ICD-10-CM

## 2022-11-08 PROCEDURE — 17003 DESTRUCT PREMALG LES 2-14: CPT

## 2022-11-08 PROCEDURE — 99212 OFFICE O/P EST SF 10 MIN: CPT | Mod: 25,GC

## 2022-11-08 PROCEDURE — 17000 DESTRUCT PREMALG LESION: CPT

## 2022-11-08 NOTE — PHYSICAL EXAM
[Alert] : alert [Oriented x 3] : ~L oriented x 3 [Well Nourished] : well nourished [FreeTextEntry3] : Bald crown with 5 scattered rough plaques.\par No suspicious lesions on face or neck.\par L forearm 2 rough plaques.\par Patient declines further exam.

## 2022-11-08 NOTE — HISTORY OF PRESENT ILLNESS
[FreeTextEntry1] : 1 year follow-up [de-identified] : Follow up for previous treatment of actinic keratoses on scalp and left forearm. Patient was last seen 1 year ago, had few AKs on frontal scalp and L forearm treated with cryotherapy. Patient having additional lesions on scalp and forearms today. Patient has no other complaints. Denies rash, new lesions, or recent changes in any spots or moles.

## 2022-11-08 NOTE — END OF VISIT
[] : Resident [FreeTextEntry3] : Actinic keratoses on left forearm and scalp, I treated by liquid nitrogen spray.\par May need topical treatment to scalp

## 2022-11-08 NOTE — ASSESSMENT
[FreeTextEntry1] : #actinic keratoses on scalp and L forearm\par Cryotherapy - 5 lesions on scalp, 2 on forearm\par Sun protection advised\par Follow-up in 6 months or sooner as needed

## 2022-11-10 DIAGNOSIS — L57.0 ACTINIC KERATOSIS: ICD-10-CM

## 2022-11-23 ENCOUNTER — APPOINTMENT (OUTPATIENT)
Dept: HEMATOLOGY ONCOLOGY | Facility: CLINIC | Age: 57
End: 2022-11-23

## 2022-11-23 VITALS
BODY MASS INDEX: 25.33 KG/M2 | SYSTOLIC BLOOD PRESSURE: 136 MMHG | WEIGHT: 171 LBS | HEIGHT: 69 IN | RESPIRATION RATE: 16 BRPM | DIASTOLIC BLOOD PRESSURE: 96 MMHG | HEART RATE: 86 BPM | TEMPERATURE: 98.2 F

## 2022-11-23 LAB
BASOPHILS # BLD AUTO: 0.03 K/UL
BASOPHILS NFR BLD AUTO: 0.6 %
EOSINOPHIL # BLD AUTO: 0.06 K/UL
EOSINOPHIL NFR BLD AUTO: 1.1 %
HCT VFR BLD CALC: 39.5 %
HGB BLD-MCNC: 14.3 G/DL
IMM GRANULOCYTES NFR BLD AUTO: 0.6 %
IRON SATN MFR SERPL: 78 %
IRON SERPL-MCNC: 250 UG/DL
LYMPHOCYTES # BLD AUTO: 2.13 K/UL
LYMPHOCYTES NFR BLD AUTO: 39.1 %
MAN DIFF?: NORMAL
MCHC RBC-ENTMCNC: 35.6 PG
MCHC RBC-ENTMCNC: 36.2 G/DL
MCV RBC AUTO: 98.3 FL
MONOCYTES # BLD AUTO: 0.6 K/UL
MONOCYTES NFR BLD AUTO: 11 %
NEUTROPHILS # BLD AUTO: 2.6 K/UL
NEUTROPHILS NFR BLD AUTO: 47.6 %
PLATELET # BLD AUTO: 167 K/UL
RBC # BLD: 4.02 M/UL
RBC # FLD: 12.4 %
TIBC SERPL-MCNC: 322 UG/DL
UIBC SERPL-MCNC: 72 UG/DL
WBC # FLD AUTO: 5.45 K/UL

## 2022-11-23 PROCEDURE — 99213 OFFICE O/P EST LOW 20 MIN: CPT

## 2022-11-23 NOTE — ASSESSMENT
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  The patient has been getting phlebotomies as needed.\par - Labwork today: CBC, iron studies, ferritin level\par - will keep ferritin 50 to 100; if his levels go over threshold we will call to arrange for more phlebotomy\par - US abdomen has no evidence of cirrhosis ; last done 12/2019 [facility]\par \par RTC in 6 months with CBC, iron studies, ferritin level

## 2022-11-23 NOTE — HISTORY OF PRESENT ILLNESS
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  He also had a liver biopsy, consistent with iron overload.  He has been getting intermittent phlebotomies.  He has not had phlebotomy now in several months.  His most recent ferritin was from 08/16/2016; it demonstrated a ferritin of 58.  He now presents for followup evaluation.  He states he is doing quite well.  He has no complaints.\par \par HCM\par Next Colonoscopy due in Nov 2022 [de-identified] : 1/23/17\par He presents for follow up. His ferritin in Oct was 101 and he underwent a phlebotomy of 1 unit. HIs  AFP was 7 and ultrasound of the liver was normal in November. He comes in for follow up. Hs no complaints.\par \par 4/19/17\par He presents for follow up. Last ferritin  was under 100.  He has no complaints,\par \par 7/18/17\par No events. Denies weight loss, fevers. No new meds. Last AFP an ferritin were WNL\par \par 10/31/17\par He is doing well. He had a phlebotomyin September and October. No complaints\par \par 1/23/18\par He is here for follow up for hemochromatisis. He has no complaints. He had  CT abd/Pelvis this month that was normal. His Ferretin from this month is in the 60s.\par \par 04/24/2018\par Patient is here for scheduled follow up visit. He denies any complaints. His last ferritin(03/2018) was 81.\par \par 8/14/18\par He is here for follow up.  HIs ferritin from 8/10 was 98. He has no complaints. Last AFP 2.5 in April\par \par 2/12/19\par He is here for follow up.  He had US in 8/2018 by Dr. Aviles IMPRESSION: 1.  Bilateral renal cysts.  2.  Pancreas is mostly obscured by overlying bowel gas and cannot be  assessed.  3.  Otherwise, unremarkable abdominal ultrasound.\par Last Ferritin was 90 in 12/2018.\par \par He is doing well has no complaints. \par \par 8/13/19\par He is here for follow up he had a colonoscopy by Dr. Aviles: 8/3/19  Polyp (5 mm) in the transverse colon. (Polypectomy). \par  Polyp (1 cm) in the transverse colon. (Polypectomy). \par  Polyp (5 mm) in the hepatic flexure. (Polypectomy). \par  Otherwise normal colon. \par Plan: Colonoscopy in 3 years. \par Path was negative. Last ferritin in may was 94.\par \par He has no complaints\par \par 2/10/2020\par Patient is here for a follow-up visit for hereditary hemochromatosis.  He is feeling well with no new complaints.  Most recent CBC is stable.  Ferritin from 1.17.2020 down to 109.  He was phlebotomized that same day.    \par US Abd (12.6.2019) IMPRESSION:Unremarkable right upper quadrant ultrasound.\par \par 5/11/2020\par He is doing well he has no compalitns. \par \par \par 9/21/20\par He is here for follow up. His ferritin was at goal earlier this month. He has no complaits.\par \par \par 3/15/21\par He is here for follow up feels well. He had phlebotomy in Januray 2021. Ferritin was 166 in December 2020\par \par \par 11/15/21\par He is here for follow up. Since last visit his ferritin was as high as 133 in 9/21 and he was restarted on Phlebotomy  with last on 10/18/21 and ferritin was 95. CNC f From today showed normal CBC. Last CMP was  normal. He has no complaints today.\par \par 5/11/22\par Patient is here for a follow-up visit for hereditary hemochromatosis.  He is feeling well with no new complaints.  Reviewed most recent CBC from 04/2022, which is stable.  Patient denies fever, chills, nausea, vomiting, dyspnea, pruritus, jaundice or bleeding.  Last ferritin was down to 64ng/mL from 02/2022.  No new medications.  He is reportedly up to date with age appropriate screenings including colonoscopy.  \par \par 11/23/22\par He is here for follow up.  Last blood work from 8/2022  ferritin was 70 but iron sat was high and he had a phlebotomy.  He feels well.

## 2022-11-25 LAB — FERRITIN SERPL-MCNC: 130 NG/ML

## 2022-12-07 ENCOUNTER — APPOINTMENT (OUTPATIENT)
Dept: OPHTHALMOLOGY | Facility: CLINIC | Age: 57
End: 2022-12-07

## 2022-12-07 ENCOUNTER — OUTPATIENT (OUTPATIENT)
Dept: OUTPATIENT SERVICES | Facility: HOSPITAL | Age: 57
LOS: 1 days | Discharge: HOME | End: 2022-12-07

## 2022-12-07 DIAGNOSIS — Z98.890 OTHER SPECIFIED POSTPROCEDURAL STATES: Chronic | ICD-10-CM

## 2022-12-07 PROCEDURE — 92014 COMPRE OPH EXAM EST PT 1/>: CPT

## 2022-12-07 PROCEDURE — 92134 CPTRZ OPH DX IMG PST SGM RTA: CPT | Mod: 26

## 2022-12-09 ENCOUNTER — APPOINTMENT (OUTPATIENT)
Dept: INFUSION THERAPY | Facility: CLINIC | Age: 57
End: 2022-12-09

## 2022-12-09 LAB
BASOPHILS # BLD AUTO: 0.02 K/UL
BASOPHILS NFR BLD AUTO: 0.5 %
EOSINOPHIL # BLD AUTO: 0.05 K/UL
EOSINOPHIL NFR BLD AUTO: 1.2 %
HCT VFR BLD CALC: 45.4 %
HGB BLD-MCNC: 15.8 G/DL
IMM GRANULOCYTES NFR BLD AUTO: 0.5 %
LYMPHOCYTES # BLD AUTO: 1.5 K/UL
LYMPHOCYTES NFR BLD AUTO: 36.1 %
MAN DIFF?: NORMAL
MCHC RBC-ENTMCNC: 33.3 PG
MCHC RBC-ENTMCNC: 34.8 G/DL
MCV RBC AUTO: 95.8 FL
MONOCYTES # BLD AUTO: 0.52 K/UL
MONOCYTES NFR BLD AUTO: 12.5 %
NEUTROPHILS # BLD AUTO: 2.05 K/UL
NEUTROPHILS NFR BLD AUTO: 49.2 %
PLATELET # BLD AUTO: 108 K/UL
RBC # BLD: 4.74 M/UL
RBC # FLD: 12.1 %
WBC # FLD AUTO: 4.16 K/UL

## 2022-12-12 DIAGNOSIS — H31.021 SOLAR RETINOPATHY, RIGHT EYE: ICD-10-CM

## 2022-12-12 DIAGNOSIS — H31.022 SOLAR RETINOPATHY, LEFT EYE: ICD-10-CM

## 2022-12-12 DIAGNOSIS — D23.111 OTHER BENIGN NEOPLASM OF SKIN OF RIGHT UPPER EYELID, INCLUDING CANTHUS: ICD-10-CM

## 2022-12-12 DIAGNOSIS — H57.03 MIOSIS: ICD-10-CM

## 2022-12-12 DIAGNOSIS — H40.1111 PRIMARY OPEN-ANGLE GLAUCOMA, RIGHT EYE, MILD STAGE: ICD-10-CM

## 2022-12-12 DIAGNOSIS — H40.1122 PRIMARY OPEN-ANGLE GLAUCOMA, LEFT EYE, MODERATE STAGE: ICD-10-CM

## 2023-01-03 ENCOUNTER — EMERGENCY (EMERGENCY)
Facility: HOSPITAL | Age: 58
LOS: 0 days | Discharge: HOME | End: 2023-01-03
Attending: EMERGENCY MEDICINE | Admitting: EMERGENCY MEDICINE
Payer: COMMERCIAL

## 2023-01-03 VITALS
OXYGEN SATURATION: 99 % | TEMPERATURE: 98 F | SYSTOLIC BLOOD PRESSURE: 135 MMHG | WEIGHT: 169.98 LBS | DIASTOLIC BLOOD PRESSURE: 75 MMHG | RESPIRATION RATE: 16 BRPM | HEART RATE: 78 BPM

## 2023-01-03 DIAGNOSIS — Z20.822 CONTACT WITH AND (SUSPECTED) EXPOSURE TO COVID-19: ICD-10-CM

## 2023-01-03 DIAGNOSIS — F41.9 ANXIETY DISORDER, UNSPECIFIED: ICD-10-CM

## 2023-01-03 DIAGNOSIS — R05.8 OTHER SPECIFIED COUGH: ICD-10-CM

## 2023-01-03 DIAGNOSIS — J02.9 ACUTE PHARYNGITIS, UNSPECIFIED: ICD-10-CM

## 2023-01-03 DIAGNOSIS — M10.9 GOUT, UNSPECIFIED: ICD-10-CM

## 2023-01-03 DIAGNOSIS — R09.81 NASAL CONGESTION: ICD-10-CM

## 2023-01-03 DIAGNOSIS — I10 ESSENTIAL (PRIMARY) HYPERTENSION: ICD-10-CM

## 2023-01-03 DIAGNOSIS — Z79.82 LONG TERM (CURRENT) USE OF ASPIRIN: ICD-10-CM

## 2023-01-03 DIAGNOSIS — Z98.890 OTHER SPECIFIED POSTPROCEDURAL STATES: Chronic | ICD-10-CM

## 2023-01-03 DIAGNOSIS — F32.A DEPRESSION, UNSPECIFIED: ICD-10-CM

## 2023-01-03 DIAGNOSIS — J06.9 ACUTE UPPER RESPIRATORY INFECTION, UNSPECIFIED: ICD-10-CM

## 2023-01-03 DIAGNOSIS — G40.909 EPILEPSY, UNSPECIFIED, NOT INTRACTABLE, WITHOUT STATUS EPILEPTICUS: ICD-10-CM

## 2023-01-03 LAB
FLUAV AG NPH QL: SIGNIFICANT CHANGE UP
FLUBV AG NPH QL: SIGNIFICANT CHANGE UP
RSV RNA NPH QL NAA+NON-PROBE: SIGNIFICANT CHANGE UP
SARS-COV-2 RNA SPEC QL NAA+PROBE: SIGNIFICANT CHANGE UP

## 2023-01-03 PROCEDURE — 99284 EMERGENCY DEPT VISIT MOD MDM: CPT

## 2023-01-03 RX ORDER — IBUPROFEN 200 MG
600 TABLET ORAL ONCE
Refills: 0 | Status: COMPLETED | OUTPATIENT
Start: 2023-01-03 | End: 2023-01-03

## 2023-01-03 RX ADMIN — Medication 600 MILLIGRAM(S): at 17:21

## 2023-01-03 NOTE — ED PROVIDER NOTE - OBJECTIVE STATEMENT
History of Present Illness  Mr. Giron is a 57 year old (never smoker) known to have:  - Anxiety/ Depression  - Hypertension  - History of Epilepsy      He presented to the ED on 01/03/2023 for evaluation of URTI symptoms.  History goes back to 3 days PTP when the patient started complaining of sore throat associated with dry cough, runny nose, and nasal congestion.  He otherwise denies any fever, chills, shortness of breath, loss of taste/smell, fatigue, arthralgia, or myalgias.      On review of systems, patient denies any recent night sweats, urinary symptoms (urinary frequency, urgency, intermittence, dysuria, foul smelling urine, cloudy urine), change in bowel movements (diarrhea or constipation), abdominal pain, headache, nausea, or vomiting.   No sick contacts.   No recent travel or exposure to recent travelers.      Upon presentation to the ED, the patient was hemodynamically stable:  Vital Signs in ED   - /75mmHg  - HR 78 bpm  - RR 16 bpm  - SaO2 99% on RA      - A COVID swab was performed  - In absence of alarming symptoms, will not perform CXR or blood work pending COVID swab result

## 2023-01-03 NOTE — ED PROVIDER NOTE - CLINICAL SUMMARY MEDICAL DECISION MAKING FREE TEXT BOX
57-year-old man with PMHx as noted, in ER with c/o of URI symptoms for the past 3 days.  Patient well-appearing in ER with normal vital signs.  Lungs clear to auscultation with normal WOB.  Nasal swab sent for flu/COVID.  Patient to follow-up with PMD.  Told return to ER if he feels worse, or for any new/concerning symptoms.  Patient understands agrees with plan.

## 2023-01-03 NOTE — ED PROVIDER NOTE - NSFOLLOWUPINSTRUCTIONS_ED_ALL_ED_FT
Upper Respiratory Infection, Adult      An upper respiratory infection (URI) is a common viral infection of the nose, throat, and upper air passages that lead to the lungs. The most common type of URI is the common cold. URIs usually get better on their own, without medical treatment.      What are the causes?    A URI is caused by a virus. You may catch a virus by:  •Breathing in droplets from an infected person's cough or sneeze.      •Touching something that has been exposed to the virus (is contaminated) and then touching your mouth, nose, or eyes.        What increases the risk?    You are more likely to get a URI if:  •You are very young or very old.      •You have close contact with others, such as at work, school, or a health care facility.      •You smoke.      •You have long-term (chronic) heart or lung disease.      •You have a weakened disease-fighting system (immune system).      •You have nasal allergies or asthma.      •You are experiencing a lot of stress.      •You have poor nutrition.        What are the signs or symptoms?    A URI usually involves some of the following symptoms:  •Runny or stuffy (congested) nose.      •Cough.      •Sneezing.      •Sore throat.      •Headache.      •Fatigue.      •Fever.      •Loss of appetite.      •Pain in your forehead, behind your eyes, and over your cheekbones (sinus pain).      •Muscle aches.      •Redness or irritation of the eyes.      •Pressure in the ears or face.        How is this diagnosed?    This condition may be diagnosed based on your medical history and symptoms, and a physical exam. Your health care provider may use a swab to take a mucus sample from your nose (nasal swab). This sample can be tested to determine what virus is causing the illness.      How is this treated?    URIs usually get better on their own within 7–10 days. Medicines cannot cure URIs, but your health care provider may recommend certain medicines to help relieve symptoms, such as:  •Over-the-counter cold medicines.      •Cough suppressants. Coughing is a type of defense against infection that helps to clear the respiratory system, so take these medicines only as recommended by your health care provider.      •Fever-reducing medicines.        Follow these instructions at home:    Activity     •Rest as needed.      •If you have a fever, stay home from work or school until your fever is gone or until your health care provider says your URI cannot spread to other people (is no longer contagious). Your health care provider may have you wear a face mask to prevent your infection from spreading.      Relieving symptoms     •Gargle with a mixture of salt and water 3–4 times a day or as needed. To make salt water, completely dissolve ½–1 tsp (3–6 g) of salt in 1 cup (237 mL) of warm water.      •Use a cool-mist humidifier to add moisture to the air. This can help you breathe more easily.        Eating and drinking   A comparison of three sample cups showing dark yellow, yellow, and pale yellow urine.   •Drink enough fluid to keep your urine pale yellow.      •Eat soups and other clear broths.        General instructions   A sign showing that a person should not smoke.   •Take over-the-counter and prescription medicines only as told by your health care provider. These include cold medicines, fever reducers, and cough suppressants.      • Do not use any products that contain nicotine or tobacco. These products include cigarettes, chewing tobacco, and vaping devices, such as e-cigarettes. If you need help quitting, ask your health care provider.      •Stay away from secondhand smoke.      •Stay up to date on all immunizations, including the yearly (annual) flu vaccine.      •Keep all follow-up visits. This is important.        How to prevent the spread of infection to others   Washing hands with soap and water.   URIs can be contagious. To prevent the infection from spreading:  •Wash your hands with soap and water for at least 20 seconds. If soap and water are not available, use hand .      •Avoid touching your mouth, face, eyes, or nose.      •Cough or sneeze into a tissue or your sleeve or elbow instead of into your hand or into the air.        Contact a health care provider if:    •You are getting worse instead of better.      •You have a fever or chills.      •Your mucus is brown or red.      •You have yellow or brown discharge coming from your nose.      •You have pain in your face, especially when you bend forward.      •You have swollen neck glands.      •You have pain while swallowing.      •You have white areas in the back of your throat.        Get help right away if:    •You have shortness of breath that gets worse.    •You have severe or persistent:  •Headache.      •Ear pain.      •Sinus pain.      •Chest pain.      •You have chronic lung disease along with any of the following:  •Making high-pitched whistling sounds when you breathe, most often when you breathe out (wheezing).      •Prolonged cough (more than 14 days).      •Coughing up blood.      •A change in your usual mucus.        •You have a stiff neck.    •You have changes in your:  •Vision.      •Hearing.      •Thinking.      •Mood.        These symptoms may be an emergency. Get help right away. Call 911.   • Do not wait to see if the symptoms will go away.       • Do not drive yourself to the hospital.         Summary    •An upper respiratory infection (URI) is a common infection of the nose, throat, and upper air passages that lead to the lungs.      •A URI is caused by a virus.      •URIs usually get better on their own within 7–10 days.      •Medicines cannot cure URIs, but your health care provider may recommend certain medicines to help relieve symptoms.      This information is not intended to replace advice given to you by your health care provider. Make sure you discuss any questions you have with your health care provider.

## 2023-01-03 NOTE — ED PROVIDER NOTE - ATTENDING CONTRIBUTION TO CARE
57-year-old man with h/o HTN, seizures, anxiety/depression in ER with c/o of 3-day h/o URI complaints -+ nasal congestion, + cough, + sore throat, + rhinorrhea.  No F/C.  No CP/SOB.  No abdominal pain.  No N/V/D.  No HA/dizziness/syncope.  Patient requesting testing for COVID.  PE - nad, nc/at, eomi, perrl, op - clear, mmm, neck supple, cta b/l, no w/r/r, rrr, abd- soft, nt/nd, nabs, from x 4, no LE swelling/tenderness, A&O x 3, cn 2-12 intact, no focal motor/sensory deficits.   -Nasal swab for flu/COVID

## 2023-01-03 NOTE — ED PROVIDER NOTE - PHYSICAL EXAMINATION
- Physical Exam in ED  * General Appearance: Alert, cooperative, interactive, oriented to time, place, and person, in no acute distress  * Head: Normocephalic, without obvious abnormality, atraumatic  * Nose: Nares normal, septum midline, mucosa normal, no drainage or sinus tenderness  * Throat: Lips, mucosa, and tongue normal; teeth and gums normal  * Neck: Supple, symmetrical, trachea midline, no adenopathy;   * Lungs: Respirations unlabored, Good bilateral air entry, normal breath sounds (Clear to auscultation bilaterally, no audible wheezes, crackles, or rhonchi)  * Heart: Regular Rate and Rhythm, normal S1 and S2, no audible murmur, rub, or gallop  * Abdomen: Symmetric, non-distended, no scar, soft, non-tender, bowel sounds active all four quadrants, no masses, no organomegaly (no hepatosplenomegaly)  * Extremities: Extremities normal, atraumatic, no cyanosis, no lower extremity pitting edema bilaterally, adequate dorsalis pedis pulses  * Pulses: 2+ and symmetric all extremities  * Skin: Skin color, texture, turgor normal, no rashes or lesions  * Lymph nodes: Cervical, supraclavicular, and axillary nodes normal  * Neurologic CNII-XII intact, normal strength, sensation and reflexes throughout

## 2023-01-03 NOTE — ED PROVIDER NOTE - PATIENT PORTAL LINK FT
You can access the FollowMyHealth Patient Portal offered by [facility] by registering at the following website: [email or web address]. By joining [employer]’s FollowMyHealth portal, you will also be able to view your health information using other applications (apps) compatible with our system.

## 2023-01-03 NOTE — ED PROVIDER NOTE - CARE PROVIDER_API CALL
Sergey Brenner (DO)  Medicine  [address] [place] [address], [address] [place] [address]  Phone: [phone]  Fax: [phone]  Follow Up Time: 7-10 Days

## 2023-01-11 NOTE — ED PROVIDER NOTE - PHYSICAL EXAMINATION
Physical Exam    Vital Signs: I have reviewed the initial vital signs.  Constitutional: well-nourished, appears stated age, no acute distress  Musculoskeletal: supple neck, no lower extremity edema, no calf tenderness, FROM of b/l digits, ankles, and knees. no effusion of the knee. no lower extremity edema b/l. no tenderness to the ankle, or metatarsals. good capillary refill <2 seconds. b/l pedal pulses equal 2+ b/l.   Integumentary: warm, dry, no rash. no ecchymosis.   Neurologic: extremities’ motor and sensory functions grossly intact
177.8

## 2023-01-24 ENCOUNTER — APPOINTMENT (OUTPATIENT)
Dept: INTERNAL MEDICINE | Facility: CLINIC | Age: 58
End: 2023-01-24

## 2023-01-31 ENCOUNTER — OUTPATIENT (OUTPATIENT)
Dept: OUTPATIENT SERVICES | Facility: HOSPITAL | Age: 58
LOS: 1 days | Discharge: HOME | End: 2023-01-31

## 2023-01-31 ENCOUNTER — APPOINTMENT (OUTPATIENT)
Dept: INTERNAL MEDICINE | Facility: CLINIC | Age: 58
End: 2023-01-31
Payer: COMMERCIAL

## 2023-01-31 VITALS
HEIGHT: 69 IN | HEART RATE: 80 BPM | DIASTOLIC BLOOD PRESSURE: 86 MMHG | WEIGHT: 172 LBS | TEMPERATURE: 96.6 F | OXYGEN SATURATION: 100 % | BODY MASS INDEX: 25.48 KG/M2 | SYSTOLIC BLOOD PRESSURE: 128 MMHG

## 2023-01-31 DIAGNOSIS — Z98.890 OTHER SPECIFIED POSTPROCEDURAL STATES: Chronic | ICD-10-CM

## 2023-01-31 LAB
ALBUMIN SERPL ELPH-MCNC: 5.1 G/DL
ALP BLD-CCNC: 64 U/L
ALT SERPL-CCNC: 27 U/L
ANION GAP SERPL CALC-SCNC: 12 MMOL/L
AST SERPL-CCNC: 28 U/L
BASOPHILS # BLD AUTO: 0.03 K/UL
BASOPHILS NFR BLD AUTO: 0.6 %
BILIRUB SERPL-MCNC: 0.4 MG/DL
BUN SERPL-MCNC: 24 MG/DL
CALCIUM SERPL-MCNC: 9.4 MG/DL
CHLORIDE SERPL-SCNC: 103 MMOL/L
CHOLEST SERPL-MCNC: 225 MG/DL
CO2 SERPL-SCNC: 29 MMOL/L
CREAT SERPL-MCNC: 1 MG/DL
EGFR: 88 ML/MIN/1.73M2
EOSINOPHIL # BLD AUTO: 0.08 K/UL
EOSINOPHIL NFR BLD AUTO: 1.6 %
ESTIMATED AVERAGE GLUCOSE: 100 MG/DL
GLUCOSE SERPL-MCNC: 95 MG/DL
HBA1C MFR BLD HPLC: 5.1 %
HCT VFR BLD CALC: 43.2 %
HDLC SERPL-MCNC: 78 MG/DL
HGB BLD-MCNC: 14.7 G/DL
IMM GRANULOCYTES NFR BLD AUTO: 0.8 %
LDLC SERPL CALC-MCNC: 127 MG/DL
LYMPHOCYTES # BLD AUTO: 1.95 K/UL
LYMPHOCYTES NFR BLD AUTO: 38.2 %
MAN DIFF?: NORMAL
MCHC RBC-ENTMCNC: 34 G/DL
MCHC RBC-ENTMCNC: 35.1 PG
MCV RBC AUTO: 103.1 FL
MONOCYTES # BLD AUTO: 0.61 K/UL
MONOCYTES NFR BLD AUTO: 12 %
NEUTROPHILS # BLD AUTO: 2.39 K/UL
NEUTROPHILS NFR BLD AUTO: 46.8 %
NONHDLC SERPL-MCNC: 147 MG/DL
PLATELET # BLD AUTO: 165 K/UL
POTASSIUM SERPL-SCNC: 4.2 MMOL/L
PROT SERPL-MCNC: 6.7 G/DL
RBC # BLD: 4.19 M/UL
RBC # FLD: 12.3 %
SODIUM SERPL-SCNC: 144 MMOL/L
TRIGL SERPL-MCNC: 100 MG/DL
TSH SERPL-ACNC: 1.35 UIU/ML
URATE SERPL-MCNC: 4.5 MG/DL
WBC # FLD AUTO: 5.1 K/UL

## 2023-01-31 PROCEDURE — 99214 OFFICE O/P EST MOD 30 MIN: CPT | Mod: GC

## 2023-01-31 NOTE — ASSESSMENT
[FreeTextEntry1] : 56 M with past medical history of colonic tubular adenoma, DLD, Gout, Hemochromatosis, HTN, Schizophrenia and seizure disorder\par \par \par Hyperlipidemia; undercontrolled/ diet\par Low fat, low cholesterol diet.\par Discussed importance of eating a heart healthy diet\par Counseled on aerobic exercise and weight loss\par Fasting lipid panel every 3 months\par Treatment options and possible side effects discussed \par Smoking cessation discussed, if applicable\par Patient counseled on effects of ETOH on lipid levels\par will see nutrition this week\par \par HTN;\par DASH diet discussed and recommended\par Exercise and weight loss counseled \par Frequency and target at home BP readings discussed\par Decrease caffeine intake\par Treatment options and possible side effects discussed\par Patient counseled on symptoms of hypo/hypertension\par Counseled: Yearly Ophthalmology exams# HTN\par - controlled on lisinopril 10 mg\par \par # Gout \par - on allopurinol , takes 400 mg daily\par - No recent joint pain \par \par # Hereditary Hemochromatosis \par - follows w/ Dr Rogers\par appreciated note\par \par # Schizophrenia \par - Patient is on clozapine \par - follow Psychiatry \par denies si/hi\par Depression/Anxiety- Suicide Screening;\par \par Patient denies any suicidal and homicidal ideations at this time.\par \par Patient will follow up with specialist if worsening symptoms.\par \par Information provided on psychiatric ER\par \par \par # Seizure disorder\par - Stable on Depakote \par - No seizures in many years\par \par \par # Colonic polyps\par - tubular adenoma transverse colon\par - f/u colonoscopy repeat- referral given\par \par # HCM\par - Colonoscopy give referral next visit\par Colon Cancer Screening;\par Patient counseled on the importance of colonoscopy screening and directed to follow-up with GI doctor. Failure to perform test can result in Colon Cancer and Death.\par \par - Routine labs before next visit\par - RTC 6 mo or prn\par \par patient will return tomorrow with physical forms to fill out

## 2023-01-31 NOTE — PHYSICAL EXAM
[No Acute Distress] : no acute distress [EOMI] : extraocular movements intact [Normal Oropharynx] : the oropharynx was normal [No Lymphadenopathy] : no lymphadenopathy [Supple] : supple [No Accessory Muscle Use] : no accessory muscle use [Clear to Auscultation] : lungs were clear to auscultation bilaterally [Regular Rhythm] : with a regular rhythm [Normal S1, S2] : normal S1 and S2 [Soft] : abdomen soft [Non Tender] : non-tender [Non-distended] : non-distended [No Focal Deficits] : no focal deficits [Normal Insight/Judgement] : insight and judgment were intact [de-identified] : denies command hallucination or suicidal or homicidal ideation

## 2023-01-31 NOTE — HISTORY OF PRESENT ILLNESS
[FreeTextEntry1] : routine f/u, needs physical- will bring form tomorrow [de-identified] : no complaints. no gouty flares. no break through seizures. up to date with opthalmology takes latanoprost drops for glaucoma. following heme for hemochromatosis. follows psych for schizophrenia- symptoms controlled c sertraline clozaril depakote . due for repeat colonoscopy this yr, had prior c dr cheatham, currently asymptomatic

## 2023-02-01 ENCOUNTER — RESULT CHARGE (OUTPATIENT)
Age: 58
End: 2023-02-01

## 2023-02-02 ENCOUNTER — OUTPATIENT (OUTPATIENT)
Dept: OUTPATIENT SERVICES | Facility: HOSPITAL | Age: 58
LOS: 1 days | Discharge: HOME | End: 2023-02-02

## 2023-02-02 ENCOUNTER — APPOINTMENT (OUTPATIENT)
Dept: NUTRITION | Facility: CLINIC | Age: 58
End: 2023-02-02

## 2023-02-02 ENCOUNTER — OUTPATIENT (OUTPATIENT)
Dept: OUTPATIENT SERVICES | Facility: HOSPITAL | Age: 58
LOS: 1 days | Discharge: HOME | End: 2023-02-02
Payer: COMMERCIAL

## 2023-02-02 DIAGNOSIS — F20.9 SCHIZOPHRENIA, UNSPECIFIED: ICD-10-CM

## 2023-02-02 DIAGNOSIS — E78.5 HYPERLIPIDEMIA, UNSPECIFIED: ICD-10-CM

## 2023-02-02 DIAGNOSIS — M10.9 GOUT, UNSPECIFIED: ICD-10-CM

## 2023-02-02 DIAGNOSIS — G40.909 EPILEPSY, UNSPECIFIED, NOT INTRACTABLE, WITHOUT STATUS EPILEPTICUS: ICD-10-CM

## 2023-02-02 DIAGNOSIS — I10 ESSENTIAL (PRIMARY) HYPERTENSION: ICD-10-CM

## 2023-02-02 DIAGNOSIS — E83.110 HEREDITARY HEMOCHROMATOSIS: ICD-10-CM

## 2023-02-02 DIAGNOSIS — Z00.00 ENCOUNTER FOR GENERAL ADULT MEDICAL EXAMINATION WITHOUT ABNORMAL FINDINGS: ICD-10-CM

## 2023-02-02 DIAGNOSIS — R94.31 ABNORMAL ELECTROCARDIOGRAM [ECG] [EKG]: ICD-10-CM

## 2023-02-02 PROCEDURE — 93010 ELECTROCARDIOGRAM REPORT: CPT

## 2023-02-03 ENCOUNTER — APPOINTMENT (OUTPATIENT)
Dept: INFUSION THERAPY | Facility: CLINIC | Age: 58
End: 2023-02-03

## 2023-02-03 ENCOUNTER — OUTPATIENT (OUTPATIENT)
Dept: OUTPATIENT SERVICES | Facility: HOSPITAL | Age: 58
LOS: 1 days | End: 2023-02-03

## 2023-02-03 ENCOUNTER — LABORATORY RESULT (OUTPATIENT)
Age: 58
End: 2023-02-03

## 2023-02-03 DIAGNOSIS — E78.5 HYPERLIPIDEMIA, UNSPECIFIED: ICD-10-CM

## 2023-02-03 DIAGNOSIS — Z98.890 OTHER SPECIFIED POSTPROCEDURAL STATES: Chronic | ICD-10-CM

## 2023-02-03 DIAGNOSIS — E83.110 HEREDITARY HEMOCHROMATOSIS: ICD-10-CM

## 2023-02-05 LAB
FERRITIN SERPL-MCNC: 105 NG/ML
HCT VFR BLD CALC: 40.9 %
HGB BLD-MCNC: 14.4 G/DL
IRON SATN MFR SERPL: 61 %
IRON SERPL-MCNC: 209 UG/DL
MCHC RBC-ENTMCNC: 33.6 PG
MCHC RBC-ENTMCNC: 35.2 G/DL
MCV RBC AUTO: 95.6 FL
PLATELET # BLD AUTO: 150 K/UL
PMV BLD: 11.5 FL
RBC # BLD: 4.28 M/UL
RBC # FLD: 11.8 %
TIBC SERPL-MCNC: 345 UG/DL
UIBC SERPL-MCNC: 136 UG/DL
WBC # FLD AUTO: 5.15 K/UL

## 2023-02-06 LAB — HIV1+2 AB SPEC QL IA.RAPID: NONREACTIVE

## 2023-02-07 ENCOUNTER — NON-APPOINTMENT (OUTPATIENT)
Age: 58
End: 2023-02-07

## 2023-02-07 LAB
M TB IFN-G BLD-IMP: NEGATIVE
QUANTIFERON TB PLUS MITOGEN MINUS NIL: 2.08 IU/ML
QUANTIFERON TB PLUS NIL: 0.02 IU/ML
QUANTIFERON TB PLUS TB1 MINUS NIL: -0.01 IU/ML
QUANTIFERON TB PLUS TB2 MINUS NIL: -0.01 IU/ML

## 2023-04-05 ENCOUNTER — APPOINTMENT (OUTPATIENT)
Dept: OPHTHALMOLOGY | Facility: CLINIC | Age: 58
End: 2023-04-05

## 2023-04-05 ENCOUNTER — APPOINTMENT (OUTPATIENT)
Dept: OPHTHALMOLOGY | Facility: CLINIC | Age: 58
End: 2023-04-05
Payer: COMMERCIAL

## 2023-04-05 ENCOUNTER — OUTPATIENT (OUTPATIENT)
Dept: OUTPATIENT SERVICES | Facility: HOSPITAL | Age: 58
LOS: 1 days | End: 2023-04-05
Payer: COMMERCIAL

## 2023-04-05 DIAGNOSIS — Z98.890 OTHER SPECIFIED POSTPROCEDURAL STATES: Chronic | ICD-10-CM

## 2023-04-05 DIAGNOSIS — H53.8 OTHER VISUAL DISTURBANCES: ICD-10-CM

## 2023-04-05 PROCEDURE — 92134 CPTRZ OPH DX IMG PST SGM RTA: CPT | Mod: 26

## 2023-04-05 PROCEDURE — 92134 CPTRZ OPH DX IMG PST SGM RTA: CPT

## 2023-04-05 PROCEDURE — 92012 INTRM OPH EXAM EST PATIENT: CPT

## 2023-04-14 DIAGNOSIS — H31.023 SOLAR RETINOPATHY, BILATERAL: ICD-10-CM

## 2023-04-14 DIAGNOSIS — H57.03 MIOSIS: ICD-10-CM

## 2023-04-14 DIAGNOSIS — H40.1131 PRIMARY OPEN-ANGLE GLAUCOMA, BILATERAL, MILD STAGE: ICD-10-CM

## 2023-05-09 ENCOUNTER — APPOINTMENT (OUTPATIENT)
Dept: DERMATOLOGY | Facility: CLINIC | Age: 58
End: 2023-05-09

## 2023-05-26 ENCOUNTER — OUTPATIENT (OUTPATIENT)
Dept: OUTPATIENT SERVICES | Facility: HOSPITAL | Age: 58
LOS: 1 days | End: 2023-05-26
Payer: COMMERCIAL

## 2023-05-26 ENCOUNTER — APPOINTMENT (OUTPATIENT)
Dept: INFUSION THERAPY | Facility: CLINIC | Age: 58
End: 2023-05-26

## 2023-05-26 ENCOUNTER — LABORATORY RESULT (OUTPATIENT)
Age: 58
End: 2023-05-26

## 2023-05-26 ENCOUNTER — APPOINTMENT (OUTPATIENT)
Dept: INFUSION THERAPY | Facility: CLINIC | Age: 58
End: 2023-05-26
Payer: COMMERCIAL

## 2023-05-26 ENCOUNTER — APPOINTMENT (OUTPATIENT)
Dept: HEMATOLOGY ONCOLOGY | Facility: CLINIC | Age: 58
End: 2023-05-26
Payer: COMMERCIAL

## 2023-05-26 ENCOUNTER — APPOINTMENT (OUTPATIENT)
Dept: HEMATOLOGY ONCOLOGY | Facility: CLINIC | Age: 58
End: 2023-05-26

## 2023-05-26 DIAGNOSIS — F20.9 SCHIZOPHRENIA, UNSPECIFIED: ICD-10-CM

## 2023-05-26 DIAGNOSIS — Z98.890 OTHER SPECIFIED POSTPROCEDURAL STATES: Chronic | ICD-10-CM

## 2023-05-26 LAB
HCT VFR BLD CALC: 41.6 %
HGB BLD-MCNC: 14.6 G/DL
IRON SATN MFR SERPL: 45 %
IRON SERPL-MCNC: 134 UG/DL
MCHC RBC-ENTMCNC: 33.3 PG
MCHC RBC-ENTMCNC: 35.1 G/DL
MCV RBC AUTO: 94.8 FL
PLATELET # BLD AUTO: 158 K/UL
PMV BLD: 10.2 FL
RBC # BLD: 4.39 M/UL
RBC # FLD: 12 %
TIBC SERPL-MCNC: 300 UG/DL
UIBC SERPL-MCNC: 166 UG/DL
WBC # FLD AUTO: 4.94 K/UL

## 2023-05-26 PROCEDURE — 36415 COLL VENOUS BLD VENIPUNCTURE: CPT

## 2023-05-26 PROCEDURE — 82728 ASSAY OF FERRITIN: CPT

## 2023-05-26 PROCEDURE — 83540 ASSAY OF IRON: CPT

## 2023-05-26 PROCEDURE — 85027 COMPLETE CBC AUTOMATED: CPT

## 2023-05-26 PROCEDURE — 83550 IRON BINDING TEST: CPT

## 2023-05-26 PROCEDURE — 99213 OFFICE O/P EST LOW 20 MIN: CPT

## 2023-05-26 NOTE — HISTORY OF PRESENT ILLNESS
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  He also had a liver biopsy, consistent with iron overload.  He has been getting intermittent phlebotomies.  He has not had phlebotomy now in several months.  His most recent ferritin was from 08/16/2016; it demonstrated a ferritin of 58.  He now presents for followup evaluation.  He states he is doing quite well.  He has no complaints.\par \par HCM\par Next Colonoscopy due in Nov 2022 [de-identified] : 1/23/17\par He presents for follow up. His ferritin in Oct was 101 and he underwent a phlebotomy of 1 unit. HIs  AFP was 7 and ultrasound of the liver was normal in November. He comes in for follow up. Hs no complaints.\par \par 4/19/17\par He presents for follow up. Last ferritin  was under 100.  He has no complaints,\par \par 7/18/17\par No events. Denies weight loss, fevers. No new meds. Last AFP an ferritin were WNL\par \par 10/31/17\par He is doing well. He had a phlebotomyin September and October. No complaints\par \par 1/23/18\par He is here for follow up for hemochromatisis. He has no complaints. He had  CT abd/Pelvis this month that was normal. His Ferretin from this month is in the 60s.\par \par 04/24/2018\par Patient is here for scheduled follow up visit. He denies any complaints. His last ferritin(03/2018) was 81.\par \par 8/14/18\par He is here for follow up.  HIs ferritin from 8/10 was 98. He has no complaints. Last AFP 2.5 in April\par \par 2/12/19\par He is here for follow up.  He had US in 8/2018 by Dr. Aviles IMPRESSION: 1.  Bilateral renal cysts.  2.  Pancreas is mostly obscured by overlying bowel gas and cannot be  assessed.  3.  Otherwise, unremarkable abdominal ultrasound.\par Last Ferritin was 90 in 12/2018.\par \par He is doing well has no complaints. \par \par 8/13/19\par He is here for follow up he had a colonoscopy by Dr. Aviles: 8/3/19  Polyp (5 mm) in the transverse colon. (Polypectomy). \par  Polyp (1 cm) in the transverse colon. (Polypectomy). \par  Polyp (5 mm) in the hepatic flexure. (Polypectomy). \par  Otherwise normal colon. \par Plan: Colonoscopy in 3 years. \par Path was negative. Last ferritin in may was 94.\par \par He has no complaints\par \par 2/10/2020\par Patient is here for a follow-up visit for hereditary hemochromatosis.  He is feeling well with no new complaints.  Most recent CBC is stable.  Ferritin from 1.17.2020 down to 109.  He was phlebotomized that same day.    \par US Abd (12.6.2019) IMPRESSION:Unremarkable right upper quadrant ultrasound.\par \par 5/11/2020\par He is doing well he has no compalitns. \par \par \par 9/21/20\par He is here for follow up. His ferritin was at goal earlier this month. He has no complaits.\par \par \par 3/15/21\par He is here for follow up feels well. He had phlebotomy in Januray 2021. Ferritin was 166 in December 2020\par \par \par 11/15/21\par He is here for follow up. Since last visit his ferritin was as high as 133 in 9/21 and he was restarted on Phlebotomy  with last on 10/18/21 and ferritin was 95. CNC f From today showed normal CBC. Last CMP was  normal. He has no complaints today.\par \par 5/11/22\par Patient is here for a follow-up visit for hereditary hemochromatosis.  He is feeling well with no new complaints.  Reviewed most recent CBC from 04/2022, which is stable.  Patient denies fever, chills, nausea, vomiting, dyspnea, pruritus, jaundice or bleeding.  Last ferritin was down to 64ng/mL from 02/2022.  No new medications.  He is reportedly up to date with age appropriate screenings including colonoscopy.  \par \par 11/23/22\par He is here for follow up.  Last blood work from 8/2022  ferritin was 70 but iron sat was high and he had a phlebotomy.  He feels well. \par \par 5/26/23\par He is here for followup. His last ferritin was 103, TSAT 61% in February, and he was phlebotomized at that time. He has no complaints today.

## 2023-05-26 NOTE — ASSESSMENT
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  The patient has been getting phlebotomies as needed.\par - Labwork today: CBC, iron studies, ferritin level\par - will keep ferritin 50 to 100; if his levels go over threshold we will call to arrange for more phlebotomy\par - US abdomen has no evidence of cirrhosis ; last done 12/2019 SHAREE\par \par RTC in 5 months with CBC, iron studies, ferritin level

## 2023-05-26 NOTE — END OF VISIT
[] : Fellow [FreeTextEntry3] : He is here for follow-up for his hemochromatosis we did blood work depending on ferritin we will arrange for phlebotomy come back and see us in about 4 to 5 months

## 2023-05-27 DIAGNOSIS — F20.9 SCHIZOPHRENIA, UNSPECIFIED: ICD-10-CM

## 2023-05-30 LAB — FERRITIN SERPL-MCNC: 98 NG/ML

## 2023-06-07 DIAGNOSIS — E83.110 HEREDITARY HEMOCHROMATOSIS: ICD-10-CM

## 2023-07-18 NOTE — ASSESSMENT
[FreeTextEntry1] :  This is a 56yearold male with history of compound heterozygous mutation, HFE gene for C282Y and H63D mutation, with liver biopsy consistent with iron overload.  The patient has been getting phlebotomies as needed.\par - will keep ferritin 50 to 100; we will arrange for more phlebotomy\par - will  stop  US abdomen  every 6 months since he has no evidence of cirrhosis ; last done 12/2019 SHAREE\par \par Plan:\par -will have him undergo phlebotomy today and RTC in 3 months for blood work and to see me in 6 months. Will restart Phlebotomy again if needed in 3 month Path Notes (To The Dermatopathologist): Please check margins. Previous accession [id number] Path Notes (To The Dermatopathologist): Please check margins. Previous accession [id number]

## 2023-08-01 ENCOUNTER — APPOINTMENT (OUTPATIENT)
Dept: INTERNAL MEDICINE | Facility: CLINIC | Age: 58
End: 2023-08-01

## 2023-08-15 ENCOUNTER — APPOINTMENT (OUTPATIENT)
Dept: INTERNAL MEDICINE | Facility: CLINIC | Age: 58
End: 2023-08-15
Payer: COMMERCIAL

## 2023-08-15 ENCOUNTER — OUTPATIENT (OUTPATIENT)
Dept: OUTPATIENT SERVICES | Facility: HOSPITAL | Age: 58
LOS: 1 days | End: 2023-08-15
Payer: COMMERCIAL

## 2023-08-15 VITALS
OXYGEN SATURATION: 98 % | SYSTOLIC BLOOD PRESSURE: 123 MMHG | DIASTOLIC BLOOD PRESSURE: 82 MMHG | WEIGHT: 172 LBS | HEART RATE: 80 BPM | TEMPERATURE: 97 F | BODY MASS INDEX: 25.48 KG/M2 | HEIGHT: 69 IN

## 2023-08-15 DIAGNOSIS — Z98.890 OTHER SPECIFIED POSTPROCEDURAL STATES: Chronic | ICD-10-CM

## 2023-08-15 DIAGNOSIS — Z00.00 ENCOUNTER FOR GENERAL ADULT MEDICAL EXAMINATION WITHOUT ABNORMAL FINDINGS: ICD-10-CM

## 2023-08-15 DIAGNOSIS — F41.9 ANXIETY DISORDER, UNSPECIFIED: ICD-10-CM

## 2023-08-15 PROCEDURE — 99214 OFFICE O/P EST MOD 30 MIN: CPT

## 2023-08-15 RX ORDER — SERTRALINE 25 MG/1
25 TABLET, FILM COATED ORAL DAILY
Refills: 0 | Status: ACTIVE | COMMUNITY

## 2023-08-15 NOTE — REVIEW OF SYSTEMS
[Fever] : no fever [Chills] : no chills [Discharge] : no discharge [Pain] : no pain [Earache] : no earache [Hearing Loss] : no hearing loss [Chest Pain] : no chest pain [Palpitations] : no palpitations [Shortness Of Breath] : no shortness of breath [Abdominal Pain] : no abdominal pain [Nausea] : no nausea [Dysuria] : no dysuria [Incontinence] : no incontinence [Joint Pain] : no joint pain [Joint Stiffness] : no joint stiffness [Itching] : no itching [Headache] : no headache [Dizziness] : no dizziness [Suicidal] : not suicidal

## 2023-08-15 NOTE — PHYSICAL EXAM
[No Acute Distress] : no acute distress [Well Nourished] : well nourished [Normal Sclera/Conjunctiva] : normal sclera/conjunctiva [PERRL] : pupils equal round and reactive to light [Normal Outer Ear/Nose] : the outer ears and nose were normal in appearance [No JVD] : no jugular venous distention [No Respiratory Distress] : no respiratory distress  [No Accessory Muscle Use] : no accessory muscle use [Normal Rate] : normal rate  [Regular Rhythm] : with a regular rhythm [No Carotid Bruits] : no carotid bruits [No Edema] : there was no peripheral edema [Soft] : abdomen soft [Non Tender] : non-tender [No CVA Tenderness] : no CVA  tenderness [No Rash] : no rash [No Focal Deficits] : no focal deficits [Normal Affect] : the affect was normal [Normal Insight/Judgement] : insight and judgment were intact [de-identified] : no goiter [de-identified] : kyphosis pronounced  [de-identified] : boil on the upper back [de-identified] : ?mild tardive dyskinesia; denies SI/HI

## 2023-08-15 NOTE — END OF VISIT
[] : Resident [FreeTextEntry3] : I saw the patient, examined the patient independently, reviewed medical record, and provided the medical services. Agree with resident note as personally edited above.

## 2023-08-15 NOTE — HISTORY OF PRESENT ILLNESS
[FreeTextEntry1] : follow up [de-identified] : no complaints. no gouty flares. no breakthrough seizures. up to date with ophthalmology takes latanoprost drops for glaucoma. following heme for hemochromatosis. follows psych for schizophrenia- symptoms controlled  sertraline clozaril Depakote. He is here from [place] to do his annual physical. all the relevant labs were ordered. The patient denies any active complaints except for a boil on his upper back.

## 2023-08-15 NOTE — ASSESSMENT
[FreeTextEntry1] : # Annual physical exam - all the relevant labs and imaging were ordered - will follow up on that physical exam pertinent positives noted as above ordered pertinent labwork as requested for the annual [facility] [place] [facility] [place] performed in the office today personally reviewed- no acute injury pattern, WNL; qtc WNL  Hyperlipidemia; under controlled/ diet Low fat, low cholesterol diet. Discussed importance of eating a heart healthy diet Counseled on aerobic exercise and weight loss Smoking cessation discussed, if applicable Patient counseled on effects of ETOH on lipid levels  HTN; DASH diet discussed and recommended Exercise and weight loss counseled Frequency and target at home BP readings discussed Decrease caffeine intake Treatment options and possible side effects discussed Patient counseled on symptoms of hypo/hypertension Counseled: Yearly Ophthalmology exams# HTN - controlled on lisinopril 10 mg    # Gout  - on allopurinol , takes 400 mg daily  - No recent joint pain    # Hereditary Hemochromatosis  - follows w/ Dr Rogers  appreciated note    # Schizophrenia  - Patient is on clozapine  - follow Psychiatry  denies si/hi  Depression/Anxiety- Suicide Screening;    Patient denies any suicidal and homicidal ideations at this time.    Patient will follow up with specialist if worsening symptoms.    # Seizure disorder  - Stable on Depakote  - No seizures in many years    # Colonic polyps  - tubular adenoma transverse colon  - f/u colonoscopy repeat- referral given  boil/ furuncle  does not appear to need drainage at this time empiric course of augmentin counselled patient on how/when to take medication and about side effects   # HCM  - Colonoscopy give referral  hx polyps Colon Cancer Screening;  Patient counseled on the importance of colonoscopy screening and directed to follow-up with GI doctor. Failure to perform test can result in Colon Cancer and Death.

## 2023-08-16 ENCOUNTER — OUTPATIENT (OUTPATIENT)
Dept: OUTPATIENT SERVICES | Facility: HOSPITAL | Age: 58
LOS: 1 days | End: 2023-08-16
Payer: COMMERCIAL

## 2023-08-16 ENCOUNTER — RESULT REVIEW (OUTPATIENT)
Age: 58
End: 2023-08-16

## 2023-08-16 DIAGNOSIS — Z98.890 OTHER SPECIFIED POSTPROCEDURAL STATES: Chronic | ICD-10-CM

## 2023-08-16 DIAGNOSIS — Z00.00 ENCOUNTER FOR GENERAL ADULT MEDICAL EXAMINATION WITHOUT ABNORMAL FINDINGS: ICD-10-CM

## 2023-08-16 DIAGNOSIS — R07.9 CHEST PAIN, UNSPECIFIED: ICD-10-CM

## 2023-08-16 LAB
ANION GAP SERPL CALC-SCNC: 14 MMOL/L
BUN SERPL-MCNC: 17 MG/DL
CALCIUM SERPL-MCNC: 9.3 MG/DL
CHLORIDE SERPL-SCNC: 102 MMOL/L
CHOLEST SERPL-MCNC: 238 MG/DL
CO2 SERPL-SCNC: 24 MMOL/L
CREAT SERPL-MCNC: 0.9 MG/DL
EGFR: 100 ML/MIN/1.73M2
GLUCOSE SERPL-MCNC: 92 MG/DL
HDLC SERPL-MCNC: 81 MG/DL
LDLC SERPL CALC-MCNC: 138 MG/DL
NONHDLC SERPL-MCNC: 157 MG/DL
POTASSIUM SERPL-SCNC: 4.4 MMOL/L
SODIUM SERPL-SCNC: 140 MMOL/L
TRIGL SERPL-MCNC: 97 MG/DL
TSH SERPL-ACNC: 2.27 UIU/ML

## 2023-08-16 PROCEDURE — 84443 ASSAY THYROID STIM HORMONE: CPT

## 2023-08-16 PROCEDURE — 86706 HEP B SURFACE ANTIBODY: CPT

## 2023-08-16 PROCEDURE — 86735 MUMPS ANTIBODY: CPT

## 2023-08-16 PROCEDURE — 86480 TB TEST CELL IMMUN MEASURE: CPT

## 2023-08-16 PROCEDURE — 87389 HIV-1 AG W/HIV-1&-2 AB AG IA: CPT

## 2023-08-16 PROCEDURE — 86765 RUBEOLA ANTIBODY: CPT

## 2023-08-16 PROCEDURE — 86762 RUBELLA ANTIBODY: CPT

## 2023-08-16 PROCEDURE — 86705 HEP B CORE ANTIBODY IGM: CPT

## 2023-08-16 PROCEDURE — 71045 X-RAY EXAM CHEST 1 VIEW: CPT | Mod: 26

## 2023-08-16 PROCEDURE — 71045 X-RAY EXAM CHEST 1 VIEW: CPT

## 2023-08-16 PROCEDURE — 85027 COMPLETE CBC AUTOMATED: CPT

## 2023-08-16 PROCEDURE — 80048 BASIC METABOLIC PNL TOTAL CA: CPT

## 2023-08-16 PROCEDURE — 83036 HEMOGLOBIN GLYCOSYLATED A1C: CPT

## 2023-08-16 PROCEDURE — 87340 HEPATITIS B SURFACE AG IA: CPT

## 2023-08-16 PROCEDURE — 80061 LIPID PANEL: CPT

## 2023-08-17 ENCOUNTER — OUTPATIENT (OUTPATIENT)
Dept: OUTPATIENT SERVICES | Facility: HOSPITAL | Age: 58
LOS: 1 days | End: 2023-08-17
Payer: COMMERCIAL

## 2023-08-17 ENCOUNTER — APPOINTMENT (OUTPATIENT)
Dept: NUTRITION | Facility: CLINIC | Age: 58
End: 2023-08-17

## 2023-08-17 DIAGNOSIS — Z00.00 ENCOUNTER FOR GENERAL ADULT MEDICAL EXAMINATION WITHOUT ABNORMAL FINDINGS: ICD-10-CM

## 2023-08-17 DIAGNOSIS — Z98.890 OTHER SPECIFIED POSTPROCEDURAL STATES: Chronic | ICD-10-CM

## 2023-08-17 DIAGNOSIS — R07.9 CHEST PAIN, UNSPECIFIED: ICD-10-CM

## 2023-08-17 LAB
APPEARANCE: CLEAR
BASOPHILS # BLD AUTO: 0.02 K/UL
BASOPHILS NFR BLD AUTO: 0.5 %
BILIRUBIN URINE: NEGATIVE
BLOOD URINE: NEGATIVE
COLOR: YELLOW
EOSINOPHIL # BLD AUTO: 0.05 K/UL
EOSINOPHIL NFR BLD AUTO: 1.2 %
ESTIMATED AVERAGE GLUCOSE: 103 MG/DL
GLUCOSE QUALITATIVE U: NEGATIVE MG/DL
HBA1C MFR BLD HPLC: 5.2 %
HBV CORE IGM SER QL: NONREACTIVE
HBV SURFACE AB SER QL: NONREACTIVE
HBV SURFACE AG SER QL: NONREACTIVE
HCT VFR BLD CALC: 44.7 %
HGB BLD-MCNC: 15.2 G/DL
HIV1+2 AB SPEC QL IA.RAPID: NONREACTIVE
IMM GRANULOCYTES NFR BLD AUTO: 0.2 %
KETONES URINE: NEGATIVE MG/DL
LEUKOCYTE ESTERASE URINE: NEGATIVE
LYMPHOCYTES # BLD AUTO: 1.74 K/UL
LYMPHOCYTES NFR BLD AUTO: 40.8 %
MAN DIFF?: NORMAL
MCHC RBC-ENTMCNC: 33.6 PG
MCHC RBC-ENTMCNC: 34 G/DL
MCV RBC AUTO: 98.9 FL
MONOCYTES # BLD AUTO: 0.43 K/UL
MONOCYTES NFR BLD AUTO: 10.1 %
NEUTROPHILS # BLD AUTO: 2.01 K/UL
NEUTROPHILS NFR BLD AUTO: 47.2 %
NITRITE URINE: NEGATIVE
PH URINE: 6
PLATELET # BLD AUTO: 165 K/UL
PROTEIN URINE: NEGATIVE MG/DL
RBC # BLD: 4.52 M/UL
RBC # FLD: 12.2 %
SPECIFIC GRAVITY URINE: 1.02
UROBILINOGEN URINE: 1 MG/DL
WBC # FLD AUTO: 4.26 K/UL

## 2023-08-17 PROCEDURE — 97803 MED NUTRITION INDIV SUBSEQ: CPT

## 2023-08-21 LAB
M TB IFN-G BLD-IMP: NEGATIVE
MEV IGG FLD QL IA: 14.3 AU/ML
MEV IGG+IGM SER-IMP: NORMAL
MUV AB SER-ACNC: POSITIVE
MUV IGG SER QL IA: 163 AU/ML
QUANTIFERON TB PLUS MITOGEN MINUS NIL: 2.96 IU/ML
QUANTIFERON TB PLUS NIL: 0.02 IU/ML
QUANTIFERON TB PLUS TB1 MINUS NIL: -0.01 IU/ML
QUANTIFERON TB PLUS TB2 MINUS NIL: -0.01 IU/ML
RUBV IGG FLD-ACNC: 5.4 INDEX
RUBV IGG SER-IMP: POSITIVE

## 2023-08-22 DIAGNOSIS — E78.5 HYPERLIPIDEMIA, UNSPECIFIED: ICD-10-CM

## 2023-08-30 DIAGNOSIS — M10.9 GOUT, UNSPECIFIED: ICD-10-CM

## 2023-08-30 DIAGNOSIS — L02.92 FURUNCLE, UNSPECIFIED: ICD-10-CM

## 2023-08-30 DIAGNOSIS — F41.9 ANXIETY DISORDER, UNSPECIFIED: ICD-10-CM

## 2023-08-30 DIAGNOSIS — I10 ESSENTIAL (PRIMARY) HYPERTENSION: ICD-10-CM

## 2023-08-30 DIAGNOSIS — Z00.00 ENCOUNTER FOR GENERAL ADULT MEDICAL EXAMINATION WITHOUT ABNORMAL FINDINGS: ICD-10-CM

## 2023-08-30 DIAGNOSIS — F20.9 SCHIZOPHRENIA, UNSPECIFIED: ICD-10-CM

## 2023-09-08 ENCOUNTER — OUTPATIENT (OUTPATIENT)
Dept: OUTPATIENT SERVICES | Facility: HOSPITAL | Age: 58
LOS: 1 days | End: 2023-09-08
Payer: COMMERCIAL

## 2023-09-08 ENCOUNTER — LABORATORY RESULT (OUTPATIENT)
Age: 58
End: 2023-09-08

## 2023-09-08 DIAGNOSIS — Z98.890 OTHER SPECIFIED POSTPROCEDURAL STATES: Chronic | ICD-10-CM

## 2023-09-08 DIAGNOSIS — Z00.00 ENCOUNTER FOR GENERAL ADULT MEDICAL EXAMINATION WITHOUT ABNORMAL FINDINGS: ICD-10-CM

## 2023-09-08 PROCEDURE — 86762 RUBELLA ANTIBODY: CPT

## 2023-09-08 PROCEDURE — 86765 RUBEOLA ANTIBODY: CPT

## 2023-09-09 DIAGNOSIS — Z00.00 ENCOUNTER FOR GENERAL ADULT MEDICAL EXAMINATION WITHOUT ABNORMAL FINDINGS: ICD-10-CM

## 2023-09-11 LAB
MEV IGG FLD QL IA: 13.5 AU/ML
MEV IGG+IGM SER-IMP: NORMAL

## 2023-09-21 DIAGNOSIS — Z23 ENCOUNTER FOR IMMUNIZATION: ICD-10-CM

## 2023-09-22 ENCOUNTER — OUTPATIENT (OUTPATIENT)
Dept: OUTPATIENT SERVICES | Facility: HOSPITAL | Age: 58
LOS: 1 days | End: 2023-09-22
Payer: COMMERCIAL

## 2023-09-22 ENCOUNTER — APPOINTMENT (OUTPATIENT)
Dept: INTERNAL MEDICINE | Facility: CLINIC | Age: 58
End: 2023-09-22

## 2023-09-22 DIAGNOSIS — Z00.00 ENCOUNTER FOR GENERAL ADULT MEDICAL EXAMINATION WITHOUT ABNORMAL FINDINGS: ICD-10-CM

## 2023-09-22 DIAGNOSIS — Z23 ENCOUNTER FOR IMMUNIZATION: ICD-10-CM

## 2023-09-22 DIAGNOSIS — Z98.890 OTHER SPECIFIED POSTPROCEDURAL STATES: Chronic | ICD-10-CM

## 2023-09-22 PROCEDURE — 96372 THER/PROPH/DIAG INJ SC/IM: CPT

## 2023-09-22 PROCEDURE — 90471 IMMUNIZATION ADMIN: CPT

## 2023-09-22 PROCEDURE — 90707 MMR VACCINE SC: CPT

## 2023-10-04 ENCOUNTER — APPOINTMENT (OUTPATIENT)
Dept: OPHTHALMOLOGY | Facility: CLINIC | Age: 58
End: 2023-10-04
Payer: COMMERCIAL

## 2023-10-04 ENCOUNTER — OUTPATIENT (OUTPATIENT)
Dept: OUTPATIENT SERVICES | Facility: HOSPITAL | Age: 58
LOS: 1 days | End: 2023-10-04
Payer: COMMERCIAL

## 2023-10-04 DIAGNOSIS — Z98.890 OTHER SPECIFIED POSTPROCEDURAL STATES: Chronic | ICD-10-CM

## 2023-10-04 DIAGNOSIS — H53.8 OTHER VISUAL DISTURBANCES: ICD-10-CM

## 2023-10-04 PROCEDURE — 92134 CPTRZ OPH DX IMG PST SGM RTA: CPT

## 2023-10-04 PROCEDURE — 92134 CPTRZ OPH DX IMG PST SGM RTA: CPT | Mod: 26

## 2023-10-04 PROCEDURE — 92014 COMPRE OPH EXAM EST PT 1/>: CPT

## 2023-10-05 ENCOUNTER — OUTPATIENT (OUTPATIENT)
Dept: OUTPATIENT SERVICES | Facility: HOSPITAL | Age: 58
LOS: 1 days | End: 2023-10-05
Payer: COMMERCIAL

## 2023-10-05 ENCOUNTER — APPOINTMENT (OUTPATIENT)
Dept: INTERNAL MEDICINE | Facility: CLINIC | Age: 58
End: 2023-10-05
Payer: COMMERCIAL

## 2023-10-05 VITALS
BODY MASS INDEX: 25.92 KG/M2 | DIASTOLIC BLOOD PRESSURE: 67 MMHG | HEIGHT: 69 IN | HEART RATE: 77 BPM | WEIGHT: 175 LBS | SYSTOLIC BLOOD PRESSURE: 111 MMHG | OXYGEN SATURATION: 99 % | TEMPERATURE: 96.8 F

## 2023-10-05 DIAGNOSIS — F20.9 SCHIZOPHRENIA, UNSPECIFIED: ICD-10-CM

## 2023-10-05 DIAGNOSIS — I10 ESSENTIAL (PRIMARY) HYPERTENSION: ICD-10-CM

## 2023-10-05 DIAGNOSIS — E78.5 HYPERLIPIDEMIA, UNSPECIFIED: ICD-10-CM

## 2023-10-05 DIAGNOSIS — M10.9 GOUT, UNSPECIFIED: ICD-10-CM

## 2023-10-05 DIAGNOSIS — Z00.00 ENCOUNTER FOR GENERAL ADULT MEDICAL EXAMINATION WITHOUT ABNORMAL FINDINGS: ICD-10-CM

## 2023-10-05 DIAGNOSIS — G40.909 EPILEPSY, UNSPECIFIED, NOT INTRACTABLE, W/OUT STATUS EPILEPTICUS: ICD-10-CM

## 2023-10-05 DIAGNOSIS — Z00.00 ENCOUNTER FOR GENERAL ADULT MEDICAL EXAMINATION W/OUT ABNORMAL FINDINGS: ICD-10-CM

## 2023-10-05 DIAGNOSIS — Z98.890 OTHER SPECIFIED POSTPROCEDURAL STATES: Chronic | ICD-10-CM

## 2023-10-05 DIAGNOSIS — L02.92 FURUNCLE, UNSPECIFIED: ICD-10-CM

## 2023-10-05 PROCEDURE — 99214 OFFICE O/P EST MOD 30 MIN: CPT

## 2023-10-05 RX ORDER — ALLOPURINOL 100 MG/1
100 TABLET ORAL DAILY
Qty: 30 | Refills: 11 | Status: ACTIVE | COMMUNITY
Start: 2021-01-19 | End: 1900-01-01

## 2023-10-05 RX ORDER — AMOXICILLIN AND CLAVULANATE POTASSIUM 500; 125 MG/1; MG/1
500-125 TABLET, FILM COATED ORAL
Qty: 14 | Refills: 0 | Status: DISCONTINUED | COMMUNITY
Start: 2023-08-15 | End: 2023-10-05

## 2023-10-05 RX ORDER — ALLOPURINOL 300 MG/1
300 TABLET ORAL DAILY
Qty: 30 | Refills: 11 | Status: ACTIVE | COMMUNITY
Start: 2021-01-19

## 2023-10-05 RX ORDER — ATORVASTATIN CALCIUM 10 MG/1
10 TABLET, FILM COATED ORAL
Qty: 30 | Refills: 3 | Status: ACTIVE | COMMUNITY
Start: 2023-10-05 | End: 1900-01-01

## 2023-10-05 RX ORDER — ASPIRIN ENTERIC COATED TABLETS 81 MG 81 MG/1
81 TABLET, DELAYED RELEASE ORAL DAILY
Qty: 30 | Refills: 5 | Status: ACTIVE | COMMUNITY
Start: 2017-01-11 | End: 1900-01-01

## 2023-10-12 DIAGNOSIS — E78.5 HYPERLIPIDEMIA, UNSPECIFIED: ICD-10-CM

## 2023-10-12 DIAGNOSIS — H57.03 MIOSIS: ICD-10-CM

## 2023-10-12 DIAGNOSIS — F20.9 SCHIZOPHRENIA, UNSPECIFIED: ICD-10-CM

## 2023-10-12 DIAGNOSIS — H40.1131 PRIMARY OPEN-ANGLE GLAUCOMA, BILATERAL, MILD STAGE: ICD-10-CM

## 2023-10-12 DIAGNOSIS — M10.9 GOUT, UNSPECIFIED: ICD-10-CM

## 2023-10-12 DIAGNOSIS — Z00.00 ENCOUNTER FOR GENERAL ADULT MEDICAL EXAMINATION WITHOUT ABNORMAL FINDINGS: ICD-10-CM

## 2023-10-12 DIAGNOSIS — G40.909 EPILEPSY, UNSPECIFIED, NOT INTRACTABLE, WITHOUT STATUS EPILEPTICUS: ICD-10-CM

## 2023-10-12 DIAGNOSIS — H31.023 SOLAR RETINOPATHY, BILATERAL: ICD-10-CM

## 2023-10-12 DIAGNOSIS — E83.110 HEREDITARY HEMOCHROMATOSIS: ICD-10-CM

## 2023-10-12 DIAGNOSIS — L02.92 FURUNCLE, UNSPECIFIED: ICD-10-CM

## 2023-10-12 DIAGNOSIS — I10 ESSENTIAL (PRIMARY) HYPERTENSION: ICD-10-CM

## 2023-10-20 ENCOUNTER — APPOINTMENT (OUTPATIENT)
Dept: HEMATOLOGY ONCOLOGY | Facility: CLINIC | Age: 58
End: 2023-10-20
Payer: COMMERCIAL

## 2023-10-20 ENCOUNTER — OUTPATIENT (OUTPATIENT)
Dept: OUTPATIENT SERVICES | Facility: HOSPITAL | Age: 58
LOS: 1 days | End: 2023-10-20
Payer: COMMERCIAL

## 2023-10-20 ENCOUNTER — LABORATORY RESULT (OUTPATIENT)
Age: 58
End: 2023-10-20

## 2023-10-20 VITALS
HEART RATE: 97 BPM | SYSTOLIC BLOOD PRESSURE: 117 MMHG | OXYGEN SATURATION: 98 % | DIASTOLIC BLOOD PRESSURE: 70 MMHG | WEIGHT: 174 LBS | RESPIRATION RATE: 16 BRPM | TEMPERATURE: 97.3 F | BODY MASS INDEX: 25.7 KG/M2

## 2023-10-20 DIAGNOSIS — E83.110 HEREDITARY HEMOCHROMATOSIS: ICD-10-CM

## 2023-10-20 DIAGNOSIS — Z98.890 OTHER SPECIFIED POSTPROCEDURAL STATES: Chronic | ICD-10-CM

## 2023-10-20 LAB
HCT VFR BLD CALC: 44.3 %
HGB BLD-MCNC: 15.1 G/DL
IRON SATN MFR SERPL: 77 %
IRON SERPL-MCNC: 236 UG/DL
MCHC RBC-ENTMCNC: 33.3 PG
MCHC RBC-ENTMCNC: 34.1 G/DL
MCV RBC AUTO: 97.8 FL
PLATELET # BLD AUTO: 161 K/UL
PMV BLD: 10.3 FL
RBC # BLD: 4.53 M/UL
RBC # FLD: 12.1 %
TIBC SERPL-MCNC: 306 UG/DL
UIBC SERPL-MCNC: 70 UG/DL
WBC # FLD AUTO: 4.7 K/UL

## 2023-10-20 PROCEDURE — 99213 OFFICE O/P EST LOW 20 MIN: CPT

## 2023-10-20 PROCEDURE — 82728 ASSAY OF FERRITIN: CPT

## 2023-10-20 PROCEDURE — 83540 ASSAY OF IRON: CPT

## 2023-10-20 PROCEDURE — 85027 COMPLETE CBC AUTOMATED: CPT

## 2023-10-20 PROCEDURE — 83550 IRON BINDING TEST: CPT

## 2023-10-21 DIAGNOSIS — E83.110 HEREDITARY HEMOCHROMATOSIS: ICD-10-CM

## 2023-10-23 LAB — FERRITIN SERPL-MCNC: 174 NG/ML

## 2023-11-16 ENCOUNTER — APPOINTMENT (OUTPATIENT)
Dept: NUTRITION | Facility: CLINIC | Age: 58
End: 2023-11-16

## 2023-12-06 ENCOUNTER — APPOINTMENT (OUTPATIENT)
Dept: GASTROENTEROLOGY | Facility: CLINIC | Age: 58
End: 2023-12-06
Payer: COMMERCIAL

## 2023-12-06 ENCOUNTER — OUTPATIENT (OUTPATIENT)
Dept: OUTPATIENT SERVICES | Facility: HOSPITAL | Age: 58
LOS: 1 days | End: 2023-12-06
Payer: COMMERCIAL

## 2023-12-06 VITALS
WEIGHT: 178 LBS | HEIGHT: 69 IN | SYSTOLIC BLOOD PRESSURE: 116 MMHG | BODY MASS INDEX: 26.36 KG/M2 | OXYGEN SATURATION: 98 % | DIASTOLIC BLOOD PRESSURE: 78 MMHG | HEART RATE: 84 BPM

## 2023-12-06 DIAGNOSIS — Z00.00 ENCOUNTER FOR GENERAL ADULT MEDICAL EXAMINATION WITHOUT ABNORMAL FINDINGS: ICD-10-CM

## 2023-12-06 DIAGNOSIS — Z12.11 ENCOUNTER FOR SCREENING FOR MALIGNANT NEOPLASM OF COLON: ICD-10-CM

## 2023-12-06 DIAGNOSIS — Z98.890 OTHER SPECIFIED POSTPROCEDURAL STATES: Chronic | ICD-10-CM

## 2023-12-06 DIAGNOSIS — E83.110 HEREDITARY HEMOCHROMATOSIS: ICD-10-CM

## 2023-12-06 DIAGNOSIS — K63.5 POLYP OF COLON: ICD-10-CM

## 2023-12-06 PROCEDURE — 99204 OFFICE O/P NEW MOD 45 MIN: CPT

## 2023-12-06 PROCEDURE — 99214 OFFICE O/P EST MOD 30 MIN: CPT

## 2024-02-15 ENCOUNTER — OUTPATIENT (OUTPATIENT)
Dept: OUTPATIENT SERVICES | Facility: HOSPITAL | Age: 59
LOS: 1 days | End: 2024-02-15
Payer: COMMERCIAL

## 2024-02-15 DIAGNOSIS — I10 ESSENTIAL (PRIMARY) HYPERTENSION: ICD-10-CM

## 2024-02-15 DIAGNOSIS — Z00.00 ENCOUNTER FOR GENERAL ADULT MEDICAL EXAMINATION WITHOUT ABNORMAL FINDINGS: ICD-10-CM

## 2024-02-15 DIAGNOSIS — E83.110 HEREDITARY HEMOCHROMATOSIS: ICD-10-CM

## 2024-02-15 DIAGNOSIS — E78.5 HYPERLIPIDEMIA, UNSPECIFIED: ICD-10-CM

## 2024-02-15 DIAGNOSIS — Z98.890 OTHER SPECIFIED POSTPROCEDURAL STATES: Chronic | ICD-10-CM

## 2024-02-15 PROCEDURE — 80061 LIPID PANEL: CPT

## 2024-02-15 PROCEDURE — 80048 BASIC METABOLIC PNL TOTAL CA: CPT

## 2024-02-15 PROCEDURE — 83550 IRON BINDING TEST: CPT

## 2024-02-15 PROCEDURE — 84466 ASSAY OF TRANSFERRIN: CPT

## 2024-02-15 PROCEDURE — 83540 ASSAY OF IRON: CPT

## 2024-02-15 PROCEDURE — 82728 ASSAY OF FERRITIN: CPT

## 2024-02-15 PROCEDURE — 85027 COMPLETE CBC AUTOMATED: CPT

## 2024-02-16 DIAGNOSIS — I10 ESSENTIAL (PRIMARY) HYPERTENSION: ICD-10-CM

## 2024-02-16 DIAGNOSIS — E83.110 HEREDITARY HEMOCHROMATOSIS: ICD-10-CM

## 2024-02-16 DIAGNOSIS — Z00.00 ENCOUNTER FOR GENERAL ADULT MEDICAL EXAMINATION WITHOUT ABNORMAL FINDINGS: ICD-10-CM

## 2024-02-16 DIAGNOSIS — E78.5 HYPERLIPIDEMIA, UNSPECIFIED: ICD-10-CM

## 2024-02-17 LAB
ANION GAP SERPL CALC-SCNC: 11 MMOL/L
BASOPHILS # BLD AUTO: 0.03 K/UL
BASOPHILS NFR BLD AUTO: 0.5 %
BUN SERPL-MCNC: 20 MG/DL
CALCIUM SERPL-MCNC: 9.3 MG/DL
CHLORIDE SERPL-SCNC: 102 MMOL/L
CHOLEST SERPL-MCNC: 214 MG/DL
CO2 SERPL-SCNC: 29 MMOL/L
CREAT SERPL-MCNC: 1 MG/DL
EGFR: 87 ML/MIN/1.73M2
EOSINOPHIL # BLD AUTO: 0.06 K/UL
EOSINOPHIL NFR BLD AUTO: 1.1 %
FERRITIN SERPL-MCNC: 264 NG/ML
GLUCOSE SERPL-MCNC: 96 MG/DL
HCT VFR BLD CALC: 45.9 %
HDLC SERPL-MCNC: 92 MG/DL
HGB BLD-MCNC: 15.9 G/DL
IMM GRANULOCYTES NFR BLD AUTO: 0.4 %
IRON SATN MFR SERPL: 84 %
IRON SERPL-MCNC: 277 UG/DL
LDLC SERPL CALC-MCNC: 92 MG/DL
LYMPHOCYTES # BLD AUTO: 1.98 K/UL
LYMPHOCYTES NFR BLD AUTO: 35.4 %
MAN DIFF?: NORMAL
MCHC RBC-ENTMCNC: 34.6 G/DL
MCHC RBC-ENTMCNC: 35.1 PG
MCV RBC AUTO: 101.3 FL
MONOCYTES # BLD AUTO: 0.64 K/UL
MONOCYTES NFR BLD AUTO: 11.4 %
NEUTROPHILS # BLD AUTO: 2.87 K/UL
NEUTROPHILS NFR BLD AUTO: 51.2 %
NONHDLC SERPL-MCNC: 122 MG/DL
PLATELET # BLD AUTO: 173 K/UL
PMV BLD AUTO: 0 /100 WBCS
POTASSIUM SERPL-SCNC: 4.4 MMOL/L
RBC # BLD: 4.53 M/UL
RBC # FLD: 12.2 %
SODIUM SERPL-SCNC: 142 MMOL/L
TIBC SERPL-MCNC: 331 UG/DL
TRANSFERRIN SERPL-MCNC: 276 MG/DL
TRIGL SERPL-MCNC: 152 MG/DL
UIBC SERPL-MCNC: 54 UG/DL
WBC # FLD AUTO: 5.6 K/UL

## 2024-02-20 ENCOUNTER — APPOINTMENT (OUTPATIENT)
Dept: INTERNAL MEDICINE | Facility: CLINIC | Age: 59
End: 2024-02-20

## 2024-02-27 RX ORDER — LISINOPRIL 10 MG/1
10 TABLET ORAL
Qty: 90 | Refills: 1 | Status: ACTIVE | COMMUNITY
Start: 2019-11-18 | End: 1900-01-01

## 2024-03-11 ENCOUNTER — APPOINTMENT (OUTPATIENT)
Age: 59
End: 2024-03-11

## 2024-04-04 ENCOUNTER — APPOINTMENT (OUTPATIENT)
Dept: INTERNAL MEDICINE | Facility: CLINIC | Age: 59
End: 2024-04-04

## 2024-04-11 ENCOUNTER — OUTPATIENT (OUTPATIENT)
Dept: OUTPATIENT SERVICES | Facility: HOSPITAL | Age: 59
LOS: 1 days | End: 2024-04-11
Payer: MEDICARE

## 2024-04-11 ENCOUNTER — APPOINTMENT (OUTPATIENT)
Dept: OPHTHALMOLOGY | Facility: CLINIC | Age: 59
End: 2024-04-11
Payer: COMMERCIAL

## 2024-04-11 DIAGNOSIS — H53.8 OTHER VISUAL DISTURBANCES: ICD-10-CM

## 2024-04-11 DIAGNOSIS — H40.1131 PRIMARY OPEN-ANGLE GLAUCOMA, BILATERAL, MILD STAGE: ICD-10-CM

## 2024-04-11 DIAGNOSIS — Z98.890 OTHER SPECIFIED POSTPROCEDURAL STATES: Chronic | ICD-10-CM

## 2024-04-11 PROCEDURE — 92083 EXTENDED VISUAL FIELD XM: CPT | Mod: 26

## 2024-04-11 PROCEDURE — 92083 EXTENDED VISUAL FIELD XM: CPT

## 2024-05-02 ENCOUNTER — OUTPATIENT (OUTPATIENT)
Dept: OUTPATIENT SERVICES | Facility: HOSPITAL | Age: 59
LOS: 1 days | End: 2024-05-02
Payer: COMMERCIAL

## 2024-05-02 ENCOUNTER — APPOINTMENT (OUTPATIENT)
Age: 59
End: 2024-05-02
Payer: COMMERCIAL

## 2024-05-02 ENCOUNTER — LABORATORY RESULT (OUTPATIENT)
Age: 59
End: 2024-05-02

## 2024-05-02 VITALS
HEART RATE: 89 BPM | RESPIRATION RATE: 16 BRPM | TEMPERATURE: 98.2 F | HEIGHT: 69 IN | SYSTOLIC BLOOD PRESSURE: 111 MMHG | DIASTOLIC BLOOD PRESSURE: 73 MMHG | BODY MASS INDEX: 26.81 KG/M2 | WEIGHT: 181 LBS

## 2024-05-02 DIAGNOSIS — E83.110 HEREDITARY HEMOCHROMATOSIS: ICD-10-CM

## 2024-05-02 DIAGNOSIS — Z98.890 OTHER SPECIFIED POSTPROCEDURAL STATES: Chronic | ICD-10-CM

## 2024-05-02 DIAGNOSIS — D64.9 ANEMIA, UNSPECIFIED: ICD-10-CM

## 2024-05-02 LAB
HCT VFR BLD CALC: 40.1 %
HGB BLD-MCNC: 13.9 G/DL
MCHC RBC-ENTMCNC: 32.9 PG
MCHC RBC-ENTMCNC: 34.7 G/DL
MCV RBC AUTO: 95 FL
PLATELET # BLD AUTO: 149 K/UL
PMV BLD: 10.3 FL
RBC # BLD: 4.22 M/UL
RBC # FLD: 11.9 %
WBC # FLD AUTO: 5.7 K/UL

## 2024-05-02 PROCEDURE — 36415 COLL VENOUS BLD VENIPUNCTURE: CPT

## 2024-05-02 PROCEDURE — 85027 COMPLETE CBC AUTOMATED: CPT

## 2024-05-02 PROCEDURE — 83540 ASSAY OF IRON: CPT

## 2024-05-02 PROCEDURE — G2211 COMPLEX E/M VISIT ADD ON: CPT | Mod: NC,1L

## 2024-05-02 PROCEDURE — 99213 OFFICE O/P EST LOW 20 MIN: CPT

## 2024-05-02 PROCEDURE — 82728 ASSAY OF FERRITIN: CPT

## 2024-05-02 PROCEDURE — 83550 IRON BINDING TEST: CPT

## 2024-05-03 DIAGNOSIS — D64.9 ANEMIA, UNSPECIFIED: ICD-10-CM

## 2024-05-04 LAB
FERRITIN SERPL-MCNC: 227 NG/ML
IRON SATN MFR SERPL: 69 %
IRON SERPL-MCNC: 202 UG/DL
TIBC SERPL-MCNC: 292 UG/DL
UIBC SERPL-MCNC: 90 UG/DL

## 2024-05-07 ENCOUNTER — APPOINTMENT (OUTPATIENT)
Dept: OPHTHALMOLOGY | Facility: CLINIC | Age: 59
End: 2024-05-07
Payer: COMMERCIAL

## 2024-05-07 ENCOUNTER — OUTPATIENT (OUTPATIENT)
Dept: OUTPATIENT SERVICES | Facility: HOSPITAL | Age: 59
LOS: 1 days | End: 2024-05-07
Payer: MEDICARE

## 2024-05-07 DIAGNOSIS — Z98.890 OTHER SPECIFIED POSTPROCEDURAL STATES: Chronic | ICD-10-CM

## 2024-05-07 DIAGNOSIS — H53.8 OTHER VISUAL DISTURBANCES: ICD-10-CM

## 2024-05-07 PROCEDURE — 92014 COMPRE OPH EXAM EST PT 1/>: CPT

## 2024-05-07 PROCEDURE — 92133 CPTRZD OPH DX IMG PST SGM ON: CPT | Mod: 26

## 2024-05-07 PROCEDURE — 92133 CPTRZD OPH DX IMG PST SGM ON: CPT

## 2024-05-09 PROBLEM — E83.110 HEREDITARY HEMOCHROMATOSIS: Status: ACTIVE | Noted: 2017-01-23

## 2024-05-09 NOTE — HISTORY OF PRESENT ILLNESS
[de-identified] : REASON FOR FOLLOWUP:  The patient with history of hemochromatosis; has been on phlebotomies.  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  He also had a liver biopsy, consistent with iron overload.  He has been getting intermittent phlebotomies.  He has not had phlebotomy now in several months.  His most recent ferritin was from 08/16/2016; it demonstrated a ferritin of 58.  He now presents for followup evaluation.  He states he is doing quite well.  He has no complaints.  HCM Next Colonoscopy due in Nov 2022 [de-identified] : 1/23/17 He presents for follow up. His ferritin in Oct was 101 and he underwent a phlebotomy of 1 unit. HIs  AFP was 7 and ultrasound of the liver was normal in November. He comes in for follow up. Hs no complaints.  4/19/17 He presents for follow up. Last ferritin  was under 100.  He has no complaints,  7/18/17 No events. Denies weight loss, fevers. No new meds. Last AFP an ferritin were WNL  10/31/17 He is doing well. He had a phlebotomyin September and October. No complaints  1/23/18 He is here for follow up for hemochromatisis. He has no complaints. He had  CT abd/Pelvis this month that was normal. His Ferretin from this month is in the 60s.  04/24/2018 Patient is here for scheduled follow up visit. He denies any complaints. His last ferritin (03/2018) was 81.  8/14/18 He is here for follow up.  HIs ferritin from 8/10 was 98. He has no complaints. Last AFP 2.5 in April 2/12/19 He is here for follow up.  He had US in 8/2018 by Dr. Aviles IMPRESSION: 1.  Bilateral renal cysts. 2.  Pancreas is mostly obscured by overlying bowel gas and cannot be assessed. 3.  Otherwise, unremarkable abdominal ultrasound. Last Ferritin was 90 in 12/2018. He is doing well has no complaints.   8/13/19 He is here for follow up he had a colonoscopy by Dr. Aviles: 8/3/19 Polyp (5 mm) in the transverse colon. (Polypectomy).   Polyp (1 cm) in the transverse colon. (Polypectomy).   Polyp (5 mm) in the hepatic flexure. (Polypectomy).   Otherwise normal colon.  Plan: Colonoscopy in 3 years.  Path was negative. Last ferritin in May was 94. He has no complaints.  2/10/2020 Patient is here for a follow-up visit for hereditary hemochromatosis.  He is feeling well with no new complaints.  Most recent CBC is stable.  Ferritin from 1.17.2020 down to 109.  He was phlebotomized that same day.     US Abd (12.6.2019) IMPRESSION: Unremarkable right upper quadrant ultrasound.  5/11/2020 He is doing well he has no complaints.   9/21/20 He is here for follow up. His ferritin was at goal earlier this month. He has no complaints.  3/15/21 He is here for follow up feels well. He had phlebotomy in January 2021. Ferritin was 166 in December 2020  11/15/21 He is here for follow up. Since last visit his ferritin was as high as 133 in 9/21 and he was restarted on Phlebotomy with last on 10/18/21 and ferritin was 95. CNC from today showed normal CBC. Last CMP was normal. He has no complaints today.  5/11/22 Patient is here for a follow-up visit for hereditary hemochromatosis. He is feeling well with no new complaints. Reviewed most recent CBC from 04/2022, which is stable. Patient denies fever, chills, nausea, vomiting, dyspnea, pruritus, jaundice or bleeding. Last ferritin was down to 64ng/mL from 02/2022. No new medications. He is reportedly up to date with age-appropriate screenings including colonoscopy.    11/23/22 He is here for follow up. Last blood work from 8/2022 ferritin was 70 but iron sat was high and he had a phlebotomy. He feels well.   5/26/23 He is here for follow-up. His last ferritin was 103, TSAT 61% in February, and he was phlebotomized at that time. He has no complaints today.   10/20/2023 Reports feeling well, no changes in chronic conditions or new complaints since the last visit.  5/2/2024 He is here for follow up.  His ferritin has been coming up. Last was 264 in 2/15/24 with iron sat of 84% now. He was seen by Dr. Steen and was sent to be seen by hepatology

## 2024-05-09 NOTE — ASSESSMENT
[FreeTextEntry1] : # Hereditary Hemochromatosis with history of compound heterozygous mutation, HFE gene for C282Y and H63D mutation, with liver biopsy consistent with iron overload.  - The patient has been getting phlebotomies as needed. - 12/2019 US abdomen has no evidence of cirrhosis. -ferritin is coming up and so did iron sat  PLAN: -i restarted phlebotomy, every 3 weeks to get    ferritin  to 50 again. than will decrease frequency again  - Labwork today: CBC, iron studies, ferritin level RTC in 3 months with CBC, iron studies, ferritin level.

## 2024-05-10 ENCOUNTER — LABORATORY RESULT (OUTPATIENT)
Age: 59
End: 2024-05-10

## 2024-05-10 ENCOUNTER — APPOINTMENT (OUTPATIENT)
Age: 59
End: 2024-05-10

## 2024-05-12 NOTE — END OF VISIT
CT abdomen pelvis on admission remarkable for appendicitis and sigmoid colitis.  Patient evaluated by general surgery, given benign abdominal exam appendicitis on imaging is suspected secondary to colitis.  No surgical intervention at this time.  Continue IV antibiotics.  Patient started on clear liquid diet initially, has tolerated well, advanced to low fiber low residual diet today.   [] : Resident [FreeTextEntry3] : sun damage on scalp and arfms.\par Two AKs on frontal scalp and one left forearm..\par Sun protection advised

## 2024-05-13 LAB
FERRITIN SERPL-MCNC: 219 NG/ML
HCT VFR BLD CALC: 41.5 %
HGB BLD-MCNC: 14.7 G/DL
MCHC RBC-ENTMCNC: 34.3 PG
MCHC RBC-ENTMCNC: 35.4 G/DL
MCV RBC AUTO: 97 FL
PLATELET # BLD AUTO: 150 K/UL
PMV BLD: 10.2 FL
RBC # BLD: 4.28 M/UL
RBC # FLD: 12.1 %
WBC # FLD AUTO: 4.75 K/UL

## 2024-05-21 DIAGNOSIS — H25.041 POSTERIOR SUBCAPSULAR POLAR AGE-RELATED CATARACT, RIGHT EYE: ICD-10-CM

## 2024-05-21 DIAGNOSIS — H31.023 SOLAR RETINOPATHY, BILATERAL: ICD-10-CM

## 2024-05-21 DIAGNOSIS — H40.1132 PRIMARY OPEN-ANGLE GLAUCOMA, BILATERAL, MODERATE STAGE: ICD-10-CM

## 2024-05-21 DIAGNOSIS — H57.03 MIOSIS: ICD-10-CM

## 2024-05-31 ENCOUNTER — APPOINTMENT (OUTPATIENT)
Age: 59
End: 2024-05-31

## 2024-06-05 NOTE — PHYSICAL EXAM
[No Acute Distress] : no acute distress [Well Nourished] : well nourished [Well Developed] : well developed [Well-Appearing] : well-appearing [Normal Sclera/Conjunctiva] : normal sclera/conjunctiva [PERRL] : pupils equal round and reactive to light [EOMI] : extraocular movements intact [Normal Outer Ear/Nose] : the outer ears and nose were normal in appearance [Normal Oropharynx] : the oropharynx was normal [No JVD] : no jugular venous distention [Supple] : supple [No Lymphadenopathy] : no lymphadenopathy [Thyroid Normal, No Nodules] : the thyroid was normal and there were no nodules present [No Accessory Muscle Use] : no accessory muscle use [No Respiratory Distress] : no respiratory distress  [Normal Rate] : normal rate  [Clear to Auscultation] : lungs were clear to auscultation bilaterally [Regular Rhythm] : with a regular rhythm [Normal S1, S2] : normal S1 and S2 [No Murmur] : no murmur heard [No Carotid Bruits] : no carotid bruits [No Abdominal Bruit] : a ~M bruit was not heard ~T in the abdomen [No Varicosities] : no varicosities [Pedal Pulses Present] : the pedal pulses are present [No Edema] : there was no peripheral edema [No Palpable Aorta] : no palpable aorta [No Extremity Clubbing/Cyanosis] : no extremity clubbing/cyanosis [Soft] : abdomen soft [Non Tender] : non-tender [Non-distended] : non-distended [No HSM] : no HSM [No Masses] : no abdominal mass palpated [Normal Bowel Sounds] : normal bowel sounds [Normal Posterior Cervical Nodes] : no posterior cervical lymphadenopathy [Normal Anterior Cervical Nodes] : no anterior cervical lymphadenopathy [No CVA Tenderness] : no CVA  tenderness [No Spinal Tenderness] : no spinal tenderness [No Joint Swelling] : no joint swelling [Grossly Normal Strength/Tone] : grossly normal strength/tone [No Rash] : no rash [Coordination Grossly Intact] : coordination grossly intact [No Focal Deficits] : no focal deficits [Deep Tendon Reflexes (DTR)] : deep tendon reflexes were 2+ and symmetric [Normal Gait] : normal gait [Normal Affect] : the affect was normal [Normal Insight/Judgement] : insight and judgment were intact Show Eucerin Line: Yes Action 2: Continue Hold Regimen: triamcinolone acetonide 0.1 % topical cream BID\\nSig: Apply to affected areas twice a day for two weeks/month on the body. Do not apply under arms,face ,or groin area. Continue Regimen: OTC: Zyrtec 10mg take 1 tablet twice daily as needed for itching.\\n\\nclobetasol 0.05 % topical cream BID\\nSig: Apply twice daily to rash BID for up to 14 days. May repeat monthly for flares \\n\\ntacrolimus 0.1 % topical ointment Bid\\nSig: Apply to affected areas on the body BID.\\n\\nhydroxyzine HCl 10 mg tablet Qhs\\nSig: Take 1 tablet Qhs prn\\n______________________________ Detail Level: Zone

## 2024-06-18 ENCOUNTER — APPOINTMENT (OUTPATIENT)
Age: 59
End: 2024-06-18

## 2024-06-18 ENCOUNTER — OUTPATIENT (OUTPATIENT)
Dept: OUTPATIENT SERVICES | Facility: HOSPITAL | Age: 59
LOS: 1 days | End: 2024-06-18
Payer: COMMERCIAL

## 2024-06-18 ENCOUNTER — LABORATORY RESULT (OUTPATIENT)
Age: 59
End: 2024-06-18

## 2024-06-18 VITALS — TEMPERATURE: 99 F | SYSTOLIC BLOOD PRESSURE: 108 MMHG | HEART RATE: 81 BPM | DIASTOLIC BLOOD PRESSURE: 71 MMHG

## 2024-06-18 DIAGNOSIS — D64.9 ANEMIA, UNSPECIFIED: ICD-10-CM

## 2024-06-18 DIAGNOSIS — Z98.890 OTHER SPECIFIED POSTPROCEDURAL STATES: Chronic | ICD-10-CM

## 2024-06-18 PROCEDURE — 82728 ASSAY OF FERRITIN: CPT

## 2024-06-18 PROCEDURE — 85027 COMPLETE CBC AUTOMATED: CPT

## 2024-06-18 PROCEDURE — 99195 PHLEBOTOMY: CPT

## 2024-06-19 DIAGNOSIS — D64.9 ANEMIA, UNSPECIFIED: ICD-10-CM

## 2024-06-19 LAB
HCT VFR BLD CALC: 41.6 %
HGB BLD-MCNC: 14.3 G/DL
MCHC RBC-ENTMCNC: 33.9 PG
MCHC RBC-ENTMCNC: 34.4 G/DL
MCV RBC AUTO: 98.6 FL
PLATELET # BLD AUTO: 144 K/UL
PMV BLD: 11 FL
RBC # BLD: 4.22 M/UL
RBC # FLD: 12.1 %
WBC # FLD AUTO: 6.25 K/UL

## 2024-06-21 LAB — FERRITIN SERPL-MCNC: 161 NG/ML

## 2024-07-16 ENCOUNTER — APPOINTMENT (OUTPATIENT)
Age: 59
End: 2024-07-16

## 2024-07-16 ENCOUNTER — OUTPATIENT (OUTPATIENT)
Dept: OUTPATIENT SERVICES | Facility: HOSPITAL | Age: 59
LOS: 1 days | End: 2024-07-16
Payer: COMMERCIAL

## 2024-07-16 ENCOUNTER — LABORATORY RESULT (OUTPATIENT)
Age: 59
End: 2024-07-16

## 2024-07-16 DIAGNOSIS — D64.9 ANEMIA, UNSPECIFIED: ICD-10-CM

## 2024-07-16 LAB
HCT VFR BLD CALC: 39.5 %
HGB BLD-MCNC: 13.9 G/DL
MCHC RBC-ENTMCNC: 34.1 PG
MCHC RBC-ENTMCNC: 35.2 G/DL
MCV RBC AUTO: 96.8 FL
PLATELET # BLD AUTO: 155 K/UL
PMV BLD: 10.8 FL
RBC # BLD: 4.08 M/UL
RBC # FLD: 11.9 %
WBC # FLD AUTO: 4.92 K/UL

## 2024-07-16 PROCEDURE — 82728 ASSAY OF FERRITIN: CPT

## 2024-07-16 PROCEDURE — 85027 COMPLETE CBC AUTOMATED: CPT

## 2024-07-16 PROCEDURE — 36415 COLL VENOUS BLD VENIPUNCTURE: CPT

## 2024-07-16 PROCEDURE — 99195 PHLEBOTOMY: CPT

## 2024-07-17 DIAGNOSIS — D64.9 ANEMIA, UNSPECIFIED: ICD-10-CM

## 2024-07-17 LAB — FERRITIN SERPL-MCNC: 154 NG/ML

## 2024-08-06 ENCOUNTER — LABORATORY RESULT (OUTPATIENT)
Age: 59
End: 2024-08-06

## 2024-08-06 ENCOUNTER — APPOINTMENT (OUTPATIENT)
Age: 59
End: 2024-08-06

## 2024-08-06 ENCOUNTER — OUTPATIENT (OUTPATIENT)
Dept: OUTPATIENT SERVICES | Facility: HOSPITAL | Age: 59
LOS: 1 days | End: 2024-08-06
Payer: COMMERCIAL

## 2024-08-06 VITALS
TEMPERATURE: 98 F | DIASTOLIC BLOOD PRESSURE: 75 MMHG | HEART RATE: 60 BPM | SYSTOLIC BLOOD PRESSURE: 115 MMHG | OXYGEN SATURATION: 100 %

## 2024-08-06 DIAGNOSIS — E83.110 HEREDITARY HEMOCHROMATOSIS: ICD-10-CM

## 2024-08-06 DIAGNOSIS — Z98.890 OTHER SPECIFIED POSTPROCEDURAL STATES: Chronic | ICD-10-CM

## 2024-08-06 PROCEDURE — 36415 COLL VENOUS BLD VENIPUNCTURE: CPT

## 2024-08-06 PROCEDURE — 85027 COMPLETE CBC AUTOMATED: CPT

## 2024-08-06 PROCEDURE — 82728 ASSAY OF FERRITIN: CPT

## 2024-08-06 PROCEDURE — 99195 PHLEBOTOMY: CPT

## 2024-08-07 DIAGNOSIS — E83.110 HEREDITARY HEMOCHROMATOSIS: ICD-10-CM

## 2024-08-13 ENCOUNTER — APPOINTMENT (OUTPATIENT)
Dept: OPHTHALMOLOGY | Facility: CLINIC | Age: 59
End: 2024-08-13
Payer: COMMERCIAL

## 2024-08-13 PROCEDURE — 92012 INTRM OPH EXAM EST PATIENT: CPT

## 2024-08-14 ENCOUNTER — APPOINTMENT (OUTPATIENT)
Dept: GASTROENTEROLOGY | Facility: CLINIC | Age: 59
End: 2024-08-14

## 2024-08-14 ENCOUNTER — NON-APPOINTMENT (OUTPATIENT)
Age: 59
End: 2024-08-14

## 2024-08-14 VITALS
SYSTOLIC BLOOD PRESSURE: 112 MMHG | OXYGEN SATURATION: 99 % | HEIGHT: 69 IN | TEMPERATURE: 97.1 F | DIASTOLIC BLOOD PRESSURE: 79 MMHG | HEART RATE: 83 BPM | WEIGHT: 171 LBS | BODY MASS INDEX: 25.33 KG/M2

## 2024-08-14 DIAGNOSIS — D36.9 BENIGN NEOPLASM, UNSPECIFIED SITE: ICD-10-CM

## 2024-08-14 DIAGNOSIS — E83.110 HEREDITARY HEMOCHROMATOSIS: ICD-10-CM

## 2024-08-14 PROCEDURE — 99202 OFFICE O/P NEW SF 15 MIN: CPT

## 2024-08-14 RX ORDER — POLYETHYLENE GLYCOL 3350 AND ELECTROLYTES WITH LEMON FLAVOR 236; 22.74; 6.74; 5.86; 2.97 G/4L; G/4L; G/4L; G/4L; G/4L
236 POWDER, FOR SOLUTION ORAL
Qty: 2 | Refills: 0 | Status: ACTIVE | COMMUNITY
Start: 2024-08-14 | End: 1900-01-01

## 2024-08-14 NOTE — HISTORY OF PRESENT ILLNESS
[FreeTextEntry1] : 57 yo male patient known to have schizophrenia, HTN, hemochromatosis on Aspirin 81mg QD (phlebotomies PRN), Gout, and DL. He presented for follow up in the setting of history of polyps in 2019. He had colonoscopy on 08/02/2019: 5mm TA in TC, and 5mm TA in hepatic flexure. Plan was to repeat in 3 years. Pt came to see us in Dec 2023 and was scheduled for a repeat colonoscopy and was referred to hepatology. However, as per pt he was told that his colonoscopy was postponed. Pt never followed with hepatology.

## 2024-08-14 NOTE — ASSESSMENT
[FreeTextEntry1] :  59 yo male patient known to have schizophrenia, HTN, hemochromatosis on Aspirin 81mg QD, Gout, and DL. He presented for follow up in the setting of history of polyps in 2019.  # Hx of tubular adenomas  - Prior colonoscopy on 08/02/2019: 5mm TA in TC, and 5mm TA in hepatic flexure. - Plan was to repeat in 3 years - No family history of GI malignancies -  Home med Aspirin 81mg QD; not on AC  Plan  - Will schedule patient for repeat colonoscopy - Educated patient about need to be on clears day before and NPO after midnight night before - Educated about need to drink 4L golytely day before procedure - Patient seems to have capacity and signed the consent in 2019 so will be signing consent this time  # Hereditary Hemochromatosis with history of compound heterozygous mutation, HFE gene for C282Y and H63D mutation, with liver biopsy consistent with iron overload.  -  The patient has been getting phlebotomies as needed.  - 12/2019 US abdomen has no evidence of cirrhosis.  - Outpatient follow up with hematology every 6 months for blood work: CBC, iron     studies, ferritin level.    Plan  will refer to hepatology

## 2024-08-14 NOTE — PHYSICAL EXAM
[Alert] : alert [Healthy Appearing] : healthy appearing [No Acute Distress] : no acute distress [No Respiratory Distress] : no respiratory distress [No Acc Muscle Use] : no accessory muscle use [Respiration, Rhythm And Depth] : normal respiratory rhythm and effort [Auscultation Breath Sounds / Voice Sounds] : lungs were clear to auscultation bilaterally [Heart Rate And Rhythm] : heart rate was normal and rhythm regular [Normal S1, S2] : normal S1 and S2 [Bowel Sounds] : normal bowel sounds [Abdomen Tenderness] : non-tender [Abdomen Soft] : soft [Oriented To Time, Place, And Person] : oriented to person, place, and time

## 2024-08-27 ENCOUNTER — APPOINTMENT (OUTPATIENT)
Age: 59
End: 2024-08-27

## 2024-08-27 ENCOUNTER — NON-APPOINTMENT (OUTPATIENT)
Age: 59
End: 2024-08-27

## 2024-09-03 ENCOUNTER — LABORATORY RESULT (OUTPATIENT)
Age: 59
End: 2024-09-03

## 2024-09-03 ENCOUNTER — APPOINTMENT (OUTPATIENT)
Age: 59
End: 2024-09-03

## 2024-09-03 ENCOUNTER — OUTPATIENT (OUTPATIENT)
Dept: OUTPATIENT SERVICES | Facility: HOSPITAL | Age: 59
LOS: 1 days | End: 2024-09-03
Payer: COMMERCIAL

## 2024-09-03 VITALS — DIASTOLIC BLOOD PRESSURE: 76 MMHG | TEMPERATURE: 98 F | HEART RATE: 80 BPM | SYSTOLIC BLOOD PRESSURE: 134 MMHG

## 2024-09-03 DIAGNOSIS — E83.110 HEREDITARY HEMOCHROMATOSIS: ICD-10-CM

## 2024-09-03 DIAGNOSIS — Z98.890 OTHER SPECIFIED POSTPROCEDURAL STATES: Chronic | ICD-10-CM

## 2024-09-03 PROCEDURE — 99195 PHLEBOTOMY: CPT

## 2024-09-03 PROCEDURE — 85027 COMPLETE CBC AUTOMATED: CPT

## 2024-09-03 PROCEDURE — 36415 COLL VENOUS BLD VENIPUNCTURE: CPT

## 2024-09-03 PROCEDURE — 82728 ASSAY OF FERRITIN: CPT

## 2024-09-04 DIAGNOSIS — E83.110 HEREDITARY HEMOCHROMATOSIS: ICD-10-CM

## 2024-09-09 LAB
FERRITIN SERPL-MCNC: 79 NG/ML
HCT VFR BLD CALC: 40.4 %
HGB BLD-MCNC: 13.7 G/DL
MCHC RBC-ENTMCNC: 33.3 PG
MCHC RBC-ENTMCNC: 33.9 G/DL
MCV RBC AUTO: 98.1 FL
PLATELET # BLD AUTO: 191 K/UL
PMV BLD: 10.3 FL
RBC # BLD: 4.12 M/UL
RBC # FLD: 12.4 %
WBC # FLD AUTO: 5.53 K/UL

## 2024-09-11 ENCOUNTER — OUTPATIENT (OUTPATIENT)
Dept: OUTPATIENT SERVICES | Facility: HOSPITAL | Age: 59
LOS: 1 days | Discharge: HOME | End: 2024-09-11
Payer: COMMERCIAL

## 2024-09-11 ENCOUNTER — TRANSCRIPTION ENCOUNTER (OUTPATIENT)
Age: 59
End: 2024-09-11

## 2024-09-11 VITALS
TEMPERATURE: 98 F | RESPIRATION RATE: 18 BRPM | DIASTOLIC BLOOD PRESSURE: 95 MMHG | OXYGEN SATURATION: 97 % | HEIGHT: 69 IN | WEIGHT: 175.05 LBS | HEART RATE: 98 BPM | SYSTOLIC BLOOD PRESSURE: 132 MMHG

## 2024-09-11 VITALS
OXYGEN SATURATION: 98 % | DIASTOLIC BLOOD PRESSURE: 79 MMHG | SYSTOLIC BLOOD PRESSURE: 131 MMHG | HEART RATE: 72 BPM | RESPIRATION RATE: 18 BRPM

## 2024-09-11 DIAGNOSIS — D36.9 BENIGN NEOPLASM, UNSPECIFIED SITE: ICD-10-CM

## 2024-09-11 DIAGNOSIS — Z98.890 OTHER SPECIFIED POSTPROCEDURAL STATES: Chronic | ICD-10-CM

## 2024-09-11 PROCEDURE — 45378 DIAGNOSTIC COLONOSCOPY: CPT

## 2024-09-11 NOTE — H&P PST ADULT - HISTORY OF PRESENT ILLNESS
59 yo male patient known to have schizophrenia, HTN, hemochromatosis on Aspirin 81mg QD (phlebotomies PRN), Gout, and DL.  He presented for follow up in the setting of history of polyps in 2019.  He had colonoscopy on 08/02/2019: 5mm TA in TC, 1cm mucosal fold in TC, and 5mm TA in hepatic flexure. Plan was to repeat in 3 years  He denies abdominal pain, nausea, vomiting.  He denies change in bMs (has BM QD or QOD).  He denies melena or hematochezia.  He denies weight loss.   Scheduled for colonoscopy.

## 2024-09-11 NOTE — ASU PATIENT PROFILE, ADULT - FALL HARM RISK - FALLEN IN PAST
Advised Phuong French of pt's admit to [facility] under Dr. Sathish Franks and DX: Depression Unspecified. Dr. Yosi Stuart agreed with police department pink sheet.   Pt is on a pink sheet to [facility]     Kassie Moreno RN  06/30/21 [id number] No

## 2024-09-11 NOTE — H&P PST ADULT - ASSESSMENT
57 yo male patient known to have schizophrenia, HTN, hemochromatosis on Aspirin 81mg QD (phlebotomies PRN), Gout, and DL.  He presented for follow up in the setting of history of polyps in 2019.  He had colonoscopy on 08/02/2019: 5mm TA in TC, 1cm mucosal fold in TC, and 5mm TA in hepatic flexure. Plan was to repeat in 3 years  He denies abdominal pain, nausea, vomiting.  He denies change in bMs (has BM QD or QOD).  He denies melena or hematochezia.  He denies weight loss.   Scheduled for colonoscopy.

## 2024-09-11 NOTE — PRE-ANESTHESIA EVALUATION ADULT - BSA (M2)
Unsure on phentermine as we stopped this. Planned to have her meet with nutrition to discuss weight loss first. GARRISON   1.95

## 2024-09-11 NOTE — ASU PATIENT PROFILE, ADULT - FALL HARM RISK - UNIVERSAL INTERVENTIONS
Bed in lowest position, wheels locked, appropriate side rails in place/Call bell, personal items and telephone in reach/Instruct patient to call for assistance before getting out of bed or chair/Non-slip footwear when patient is out of bed/[place] to call system/Physically safe environment - no spills, clutter or unnecessary equipment/Purposeful Proactive Rounding/Room/bathroom lighting operational, light cord in reach

## 2024-09-11 NOTE — H&P PST ADULT - PATIENT'S GENDER IDENTITY
Gen: No acute distress, morbidly obese  Pulm: breathing comfortably on room air; former trach site well healed  Cor: bradycardic, regular rate Male

## 2024-09-11 NOTE — CHART NOTE - NSCHARTNOTEFT_GEN_A_CORE
PACU ANESTHESIA ADMISSION NOTE      Procedure:   Post op diagnosis:      ____  Intubated  TV:______       Rate: ______      FiO2: ______    __x__  Patent Airway    __x__  Full return of protective reflexes    __x__  Full recovery from anesthesia / back to baseline status    Vitals:  T(C): 36.3 (09-11-24 @ 17:17), Max: 36.7 (09-11-24 @ 14:51)  HR: 65 (09-11-24 @ 17:22) (64 - 98)  BP: 119/72 (09-11-24 @ 17:22) (104/60 - 132/95)  RR: 19 (09-11-24 @ 17:22) (18 - 19)  SpO2: 100% (09-11-24 @ 17:22) (97% - 100%)    Mental Status:  __x__ Awake   ___x__ Alert   _____ Drowsy   _____ Sedated    Nausea/Vomiting:  __x__ NO  ______Yes,   See Post - Op Orders          Pain Scale (0-10):  ___0__    Treatment: ____ None    __x__ See Post - Op/PCA Orders    Post - Operative Fluids:   ____ Oral   __x__ See Post - Op Orders    Plan: Discharge:   _x___Home       _____Floor     _____Critical Care    _____  Other:_________________    Comments: Patient had smooth intraoperative event, no anesthesia complication.  PACU Vital signs: HR:   65         BP:     123   /  75        RR:  14           O2 Sat: 99      %     Temp 97.2

## 2024-09-11 NOTE — ASU PATIENT PROFILE, ADULT - TEACHING/LEARNING RELIGIOUS CONSIDERATIONS
Continue prednisone 20mg daily for 4 weeks  Continue atovaquone 1500mg daily for another 4 weeks.    Decrease prednisone to 15mg daily for 4 weeks  Then 10mg daily until you see me next    Please schedule a follow up appointment with Dr. Song at the [facility] regarding your heart testing    Call with questions.     [address]    [address] [place] [id number] [phone]   Taylor Smith RN         
none

## 2024-09-12 ENCOUNTER — OUTPATIENT (OUTPATIENT)
Dept: OUTPATIENT SERVICES | Facility: HOSPITAL | Age: 59
LOS: 1 days | End: 2024-09-12
Payer: COMMERCIAL

## 2024-09-12 ENCOUNTER — APPOINTMENT (OUTPATIENT)
Dept: INTERNAL MEDICINE | Facility: CLINIC | Age: 59
End: 2024-09-12

## 2024-09-12 VITALS
BODY MASS INDEX: 25.33 KG/M2 | SYSTOLIC BLOOD PRESSURE: 104 MMHG | TEMPERATURE: 97.5 F | DIASTOLIC BLOOD PRESSURE: 67 MMHG | HEIGHT: 69 IN | WEIGHT: 171 LBS | HEART RATE: 90 BPM | OXYGEN SATURATION: 98 %

## 2024-09-12 DIAGNOSIS — Z92.89 PERSONAL HISTORY OF OTHER MEDICAL TREATMENT: ICD-10-CM

## 2024-09-12 DIAGNOSIS — I10 ESSENTIAL (PRIMARY) HYPERTENSION: ICD-10-CM

## 2024-09-12 DIAGNOSIS — Z12.11 ENCOUNTER FOR SCREENING FOR MALIGNANT NEOPLASM OF COLON: ICD-10-CM

## 2024-09-12 DIAGNOSIS — E83.110 HEREDITARY HEMOCHROMATOSIS: ICD-10-CM

## 2024-09-12 DIAGNOSIS — Z98.890 OTHER SPECIFIED POSTPROCEDURAL STATES: Chronic | ICD-10-CM

## 2024-09-12 DIAGNOSIS — T79.6XXD: ICD-10-CM
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
## 2024-09-12 DIAGNOSIS — Z00.00 ENCOUNTER FOR GENERAL ADULT MEDICAL EXAMINATION WITHOUT ABNORMAL FINDINGS: ICD-10-CM

## 2024-09-12 DIAGNOSIS — Z00.00 ENCOUNTER FOR GENERAL ADULT MEDICAL EXAMINATION W/OUT ABNORMAL FINDINGS: ICD-10-CM

## 2024-09-12 DIAGNOSIS — F41.9 ANXIETY DISORDER, UNSPECIFIED: ICD-10-CM

## 2024-09-12 DIAGNOSIS — Z23 ENCOUNTER FOR IMMUNIZATION: ICD-10-CM

## 2024-09-12 DIAGNOSIS — F20.9 SCHIZOPHRENIA, UNSPECIFIED: ICD-10-CM

## 2024-09-12 DIAGNOSIS — D36.9 BENIGN NEOPLASM, UNSPECIFIED SITE: ICD-10-CM

## 2024-09-12 DIAGNOSIS — M10.9 GOUT, UNSPECIFIED: ICD-10-CM

## 2024-09-12 DIAGNOSIS — G40.909 EPILEPSY, UNSPECIFIED, NOT INTRACTABLE, W/OUT STATUS EPILEPTICUS: ICD-10-CM

## 2024-09-12 PROCEDURE — 99214 OFFICE O/P EST MOD 30 MIN: CPT

## 2024-09-12 PROCEDURE — 99214 OFFICE O/P EST MOD 30 MIN: CPT | Mod: 25

## 2024-09-12 PROCEDURE — 90471 IMMUNIZATION ADMIN: CPT

## 2024-09-12 PROCEDURE — 90656 IIV3 VACC NO PRSV 0.5 ML IM: CPT

## 2024-09-12 RX ORDER — LISINOPRIL 5 MG/1
5 TABLET ORAL DAILY
Qty: 30 | Refills: 3 | Status: ACTIVE | COMMUNITY
Start: 2024-09-12 | End: 1900-01-01

## 2024-09-12 NOTE — PHYSICAL EXAM
[No Acute Distress] : no acute distress [Well Nourished] : well nourished [Normal Sclera/Conjunctiva] : normal sclera/conjunctiva [Normal Outer Ear/Nose] : the outer ears and nose were normal in appearance [No JVD] : no jugular venous distention [Supple] : supple [No Respiratory Distress] : no respiratory distress  [No Accessory Muscle Use] : no accessory muscle use [Clear to Auscultation] : lungs were clear to auscultation bilaterally [Normal Rate] : normal rate  [Regular Rhythm] : with a regular rhythm [No Edema] : there was no peripheral edema [Soft] : abdomen soft [Non Tender] : non-tender [Non-distended] : non-distended [Normal Anterior Cervical Nodes] : no anterior cervical lymphadenopathy [No Spinal Tenderness] : no spinal tenderness [No Joint Swelling] : no joint swelling [Grossly Normal Strength/Tone] : grossly normal strength/tone [No Rash] : no rash [Coordination Grossly Intact] : coordination grossly intact [Normal Gait] : normal gait [Normal Affect] : the affect was normal [Normal Insight/Judgement] : insight and judgment were intact [de-identified] : masket facie

## 2024-09-12 NOTE — HISTORY OF PRESENT ILLNESS
[FreeTextEntry1] : Hospital follow-up. [de-identified] : 59 yo M with PMHx of Seizure, gout, glaucoma, hemochromatosis, schizophrenia, DLD, HTN presents for a follow up after hospital admission. Pt currently has no complaints. No seizure events, no gouty flareup. Following with hemeonc for hemochromatosis. BP today is well controlled. He was recently hospitalized in [place] after [place] on the beach and hitting his head, trauma workup including CT head was negative, patient denies any headache or visual changes, only notable labs during hospitalization was elevated CPK level.

## 2024-09-12 NOTE — ASSESSMENT
[FreeTextEntry1] : 57 yo M with PMHx of seizure, gout, glaucoma, hemochromatosis, schizophrenia presents for a routine follow-up.  # Recent hospitalization for fall  - Fall 2/2 preceding pre-syncope symptoms - Trauma workup with CT Head Negative - Syncope workup incluidng ECHO, EKG was WNL - Was found to have elevated CPK levels ~ 5K, was hospitalized for a week, CPK levels trending down during discharge - Currently endorses no pain, swelling or erythema - Will follow up with CPK, CMP and UA.  - Asked the patient to bring the hospital discharge notes on the next visit  following c alpha neurology denies any new changes to his AEDs following his f/u visit c neuro after this hospitalization  # Dyslipidemia - lipid panel stable, mildly elevated (02/24) - c/w lipitor 10mg Qhs - Educated on low fat low cholesterol diets, exercise and weight loss Hyperlipidemia; Low fat, low cholesterol diet. Discussed importance of eating a heart healthy diet Counseled on aerobic exercise and weight loss Fasting lipid panel every 3 months Treatment options and possible side effects discussed  Smoking cessation discussed, if applicable Patient counseled on effects of ETOH on lipid levels  # HTN - /67 today - Counseled on DASH diet, exercise and weight loss - Counseled on yearly Ophthalmology exams - decreased lisinopril 10mg to 5 mg QD counselled patient on how/when to take medication and about side effects - Sent BP device for home BP monitoring  controlled HTN; DASH diet discussed and recommended Exercise and weight loss counseled  Frequency and target at home BP readings discussed Decrease caffeine intake Treatment options and possible side effects discussed Patient counseled on symptoms of hypo/hypertension Counseled: Yearly Ophthalmology exams  # Gout - no recent flareups - c/w allopurinol 400 mg QD  # Hereditary Hemochromatosis - follows w/ Dr Rogers - no related complaints today - Ferritin 09/24: 79 - Restarted on phlebotomy last visit by Hem-Onc, every 3 weeks to get ferritin to 50 again  # Anxiety Disorder - Follows up with psychiatry (Dr Pop) - c/w Sertraline 25 mg daily   # Schizophrenia - On clozapine - Pt denies suicidal ideation, depression, anxiety at this time - Follows up with Psychiatry  # Seizure disorder - Stable on Depakote - No seizures in many years - Follows up with Neurology   # HCM - Colonoscopy 2019 Tubular Adenomas and Repeat colono on 09/24 showing mild diverticulosis; Internal & External Hemorrhoids. Inadequate prep, will need repeat screening colonoscopy - Nonsmoker - COVID vaccine x4 - Flu shot given this visit  Vaccine; All vaccine side effects discussed with pt prior to injection. Tolerated well by patient. Patient instructed to return to office if any side effects; including, but not limited to, rash, fever or vomiting occur.

## 2024-09-12 NOTE — REVIEW OF SYSTEMS
[Fever] : no fever [Chills] : no chills [Discharge] : no discharge [Pain] : no pain [Earache] : no earache [Hearing Loss] : no hearing loss [Chest Pain] : no chest pain [Palpitations] : no palpitations [Shortness Of Breath] : no shortness of breath [Wheezing] : no wheezing [Cough] : no cough [Abdominal Pain] : no abdominal pain [Nausea] : no nausea [Vomiting] : no vomiting [Dysuria] : no dysuria [Joint Pain] : no joint pain [Joint Stiffness] : no joint stiffness [Headache] : no headache [Dizziness] : no dizziness [Suicidal] : not suicidal [Anxiety] : no anxiety

## 2024-09-12 NOTE — END OF VISIT
[] : Resident [FreeTextEntry3] : I saw the patient, examined the patient independently, reviewed medical record, and provided the medical services. Agree with resident note as personally edited above. hfu for syncopal episode with subsequent rhabdo asked patient to bring in hospital paperwork, unclear if syncopal w/u was complete perhaps hypotension may have been culprit, will lower antihypertensive and have patient keep a bp log check ua, and repeat cpk and cr and lytes reevaluate s/p hospitalziation following neuro as well

## 2024-09-13 ENCOUNTER — OUTPATIENT (OUTPATIENT)
Dept: OUTPATIENT SERVICES | Facility: HOSPITAL | Age: 59
LOS: 1 days | End: 2024-09-13
Payer: COMMERCIAL

## 2024-09-13 DIAGNOSIS — Z79.82 LONG TERM (CURRENT) USE OF ASPIRIN: ICD-10-CM

## 2024-09-13 DIAGNOSIS — M10.9 GOUT, UNSPECIFIED: ICD-10-CM

## 2024-09-13 DIAGNOSIS — Z12.11 ENCOUNTER FOR SCREENING FOR MALIGNANT NEOPLASM OF COLON: ICD-10-CM

## 2024-09-13 DIAGNOSIS — F32.A DEPRESSION, UNSPECIFIED: ICD-10-CM

## 2024-09-13 DIAGNOSIS — I10 ESSENTIAL (PRIMARY) HYPERTENSION: ICD-10-CM

## 2024-09-13 DIAGNOSIS — Z00.00 ENCOUNTER FOR GENERAL ADULT MEDICAL EXAMINATION WITHOUT ABNORMAL FINDINGS: ICD-10-CM

## 2024-09-13 DIAGNOSIS — K63.5 POLYP OF COLON: ICD-10-CM

## 2024-09-13 DIAGNOSIS — K57.30 DIVERTICULOSIS OF LARGE INTESTINE WITHOUT PERFORATION OR ABSCESS WITHOUT BLEEDING: ICD-10-CM

## 2024-09-13 DIAGNOSIS — Z98.890 OTHER SPECIFIED POSTPROCEDURAL STATES: Chronic | ICD-10-CM

## 2024-09-13 DIAGNOSIS — K64.4 RESIDUAL HEMORRHOIDAL SKIN TAGS: ICD-10-CM

## 2024-09-13 DIAGNOSIS — K64.8 OTHER HEMORRHOIDS: ICD-10-CM

## 2024-09-13 LAB
ALBUMIN SERPL ELPH-MCNC: 5 G/DL
ALP BLD-CCNC: 54 U/L
ALT SERPL-CCNC: 25 U/L
ANION GAP SERPL CALC-SCNC: 15 MMOL/L
AST SERPL-CCNC: 23 U/L
BILIRUB SERPL-MCNC: 0.5 MG/DL
BUN SERPL-MCNC: 14 MG/DL
CALCIUM SERPL-MCNC: 9.9 MG/DL
CHLORIDE SERPL-SCNC: 102 MMOL/L
CHOLEST SERPL-MCNC: 254 MG/DL
CK SERPL-CCNC: 214 U/L
CO2 SERPL-SCNC: 25 MMOL/L
CREAT SERPL-MCNC: 1 MG/DL
EGFR: 87 ML/MIN/1.73M2
ESTIMATED AVERAGE GLUCOSE: 91 MG/DL
GLUCOSE SERPL-MCNC: 89 MG/DL
HBA1C MFR BLD HPLC: 4.8 %
HDLC SERPL-MCNC: 80 MG/DL
LDLC SERPL CALC-MCNC: 146 MG/DL
NONHDLC SERPL-MCNC: 174 MG/DL
POTASSIUM SERPL-SCNC: 4.3 MMOL/L
PROT SERPL-MCNC: 6.9 G/DL
SODIUM SERPL-SCNC: 142 MMOL/L
TRIGL SERPL-MCNC: 141 MG/DL
TSH SERPL-ACNC: 2.77 UIU/ML

## 2024-09-13 PROCEDURE — 84443 ASSAY THYROID STIM HORMONE: CPT

## 2024-09-13 PROCEDURE — 82550 ASSAY OF CK (CPK): CPT

## 2024-09-13 PROCEDURE — 83036 HEMOGLOBIN GLYCOSYLATED A1C: CPT

## 2024-09-13 PROCEDURE — 80053 COMPREHEN METABOLIC PANEL: CPT

## 2024-09-13 PROCEDURE — 80061 LIPID PANEL: CPT

## 2024-09-14 DIAGNOSIS — Z00.00 ENCOUNTER FOR GENERAL ADULT MEDICAL EXAMINATION WITHOUT ABNORMAL FINDINGS: ICD-10-CM

## 2024-09-24 ENCOUNTER — APPOINTMENT (OUTPATIENT)
Dept: OPHTHALMOLOGY | Facility: CLINIC | Age: 59
End: 2024-09-24

## 2024-09-24 ENCOUNTER — NON-APPOINTMENT (OUTPATIENT)
Age: 59
End: 2024-09-24

## 2024-09-24 ENCOUNTER — APPOINTMENT (OUTPATIENT)
Age: 59
End: 2024-09-24

## 2024-09-26 DIAGNOSIS — F20.9 SCHIZOPHRENIA, UNSPECIFIED: ICD-10-CM

## 2024-09-26 DIAGNOSIS — E83.110 HEREDITARY HEMOCHROMATOSIS: ICD-10-CM

## 2024-09-26 DIAGNOSIS — M10.9 GOUT, UNSPECIFIED: ICD-10-CM

## 2024-09-26 DIAGNOSIS — Z23 ENCOUNTER FOR IMMUNIZATION: ICD-10-CM

## 2024-09-26 DIAGNOSIS — Z12.11 ENCOUNTER FOR SCREENING FOR MALIGNANT NEOPLASM OF COLON: ICD-10-CM

## 2024-09-26 DIAGNOSIS — D36.9 BENIGN NEOPLASM, UNSPECIFIED SITE: ICD-10-CM

## 2024-09-26 DIAGNOSIS — F41.9 ANXIETY DISORDER, UNSPECIFIED: ICD-10-CM

## 2024-09-26 DIAGNOSIS — G40.909 EPILEPSY, UNSPECIFIED, NOT INTRACTABLE, WITHOUT STATUS EPILEPTICUS: ICD-10-CM

## 2024-09-26 DIAGNOSIS — I10 ESSENTIAL (PRIMARY) HYPERTENSION: ICD-10-CM

## 2024-10-09 ENCOUNTER — OUTPATIENT (OUTPATIENT)
Dept: OUTPATIENT SERVICES | Facility: HOSPITAL | Age: 59
LOS: 1 days | End: 2024-10-09
Payer: COMMERCIAL

## 2024-10-09 DIAGNOSIS — Z98.890 OTHER SPECIFIED POSTPROCEDURAL STATES: Chronic | ICD-10-CM

## 2024-10-09 DIAGNOSIS — R42 DIZZINESS AND GIDDINESS: ICD-10-CM

## 2024-10-09 DIAGNOSIS — Z00.8 ENCOUNTER FOR OTHER GENERAL EXAMINATION: ICD-10-CM

## 2024-10-09 PROCEDURE — 70553 MRI BRAIN STEM W/O & W/DYE: CPT | Mod: 26,MH

## 2024-10-09 PROCEDURE — A9579: CPT

## 2024-10-09 PROCEDURE — 70553 MRI BRAIN STEM W/O & W/DYE: CPT

## 2024-10-10 DIAGNOSIS — R42 DIZZINESS AND GIDDINESS: ICD-10-CM

## 2024-10-31 ENCOUNTER — APPOINTMENT (OUTPATIENT)
Dept: INTERNAL MEDICINE | Facility: CLINIC | Age: 59
End: 2024-10-31
Payer: COMMERCIAL

## 2024-10-31 ENCOUNTER — OUTPATIENT (OUTPATIENT)
Dept: OUTPATIENT SERVICES | Facility: HOSPITAL | Age: 59
LOS: 1 days | End: 2024-10-31
Payer: COMMERCIAL

## 2024-10-31 VITALS
DIASTOLIC BLOOD PRESSURE: 90 MMHG | HEIGHT: 69 IN | BODY MASS INDEX: 25.92 KG/M2 | TEMPERATURE: 97.4 F | SYSTOLIC BLOOD PRESSURE: 125 MMHG | WEIGHT: 175 LBS | OXYGEN SATURATION: 97 % | HEART RATE: 88 BPM

## 2024-10-31 DIAGNOSIS — Z92.89 PERSONAL HISTORY OF OTHER MEDICAL TREATMENT: ICD-10-CM

## 2024-10-31 DIAGNOSIS — Z98.890 OTHER SPECIFIED POSTPROCEDURAL STATES: Chronic | ICD-10-CM

## 2024-10-31 DIAGNOSIS — Z00.00 ENCOUNTER FOR GENERAL ADULT MEDICAL EXAMINATION WITHOUT ABNORMAL FINDINGS: ICD-10-CM

## 2024-10-31 DIAGNOSIS — I10 ESSENTIAL (PRIMARY) HYPERTENSION: ICD-10-CM

## 2024-10-31 DIAGNOSIS — E78.5 HYPERLIPIDEMIA, UNSPECIFIED: ICD-10-CM

## 2024-10-31 DIAGNOSIS — G40.909 EPILEPSY, UNSPECIFIED, NOT INTRACTABLE, W/OUT STATUS EPILEPTICUS: ICD-10-CM

## 2024-10-31 DIAGNOSIS — Z00.00 ENCOUNTER FOR GENERAL ADULT MEDICAL EXAMINATION W/OUT ABNORMAL FINDINGS: ICD-10-CM
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
## 2024-10-31 DIAGNOSIS — F20.9 SCHIZOPHRENIA, UNSPECIFIED: ICD-10-CM

## 2024-10-31 PROCEDURE — G2211 COMPLEX E/M VISIT ADD ON: CPT | Mod: NC

## 2024-10-31 PROCEDURE — 99213 OFFICE O/P EST LOW 20 MIN: CPT

## 2024-11-05 DIAGNOSIS — E83.110 HEREDITARY HEMOCHROMATOSIS: ICD-10-CM

## 2024-11-05 DIAGNOSIS — E78.5 HYPERLIPIDEMIA, UNSPECIFIED: ICD-10-CM

## 2024-11-05 DIAGNOSIS — G40.909 EPILEPSY, UNSPECIFIED, NOT INTRACTABLE, WITHOUT STATUS EPILEPTICUS: ICD-10-CM

## 2024-11-05 DIAGNOSIS — I10 ESSENTIAL (PRIMARY) HYPERTENSION: ICD-10-CM
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
## 2024-11-05 DIAGNOSIS — F20.9 SCHIZOPHRENIA, UNSPECIFIED: ICD-10-CM

## 2024-11-05 DIAGNOSIS — Z92.89 PERSONAL HISTORY OF OTHER MEDICAL TREATMENT: ICD-10-CM

## 2024-11-06 ENCOUNTER — LABORATORY RESULT (OUTPATIENT)
Age: 59
End: 2024-11-06

## 2024-11-06 ENCOUNTER — APPOINTMENT (OUTPATIENT)
Age: 59
End: 2024-11-06
Payer: COMMERCIAL

## 2024-11-06 ENCOUNTER — OUTPATIENT (OUTPATIENT)
Dept: OUTPATIENT SERVICES | Facility: HOSPITAL | Age: 59
LOS: 1 days | End: 2024-11-06
Payer: COMMERCIAL

## 2024-11-06 VITALS
WEIGHT: 177 LBS | OXYGEN SATURATION: 100 % | RESPIRATION RATE: 16 BRPM | BODY MASS INDEX: 26.22 KG/M2 | SYSTOLIC BLOOD PRESSURE: 115 MMHG | TEMPERATURE: 98 F | DIASTOLIC BLOOD PRESSURE: 74 MMHG | HEART RATE: 72 BPM | HEIGHT: 69 IN

## 2024-11-06 DIAGNOSIS — Z98.890 OTHER SPECIFIED POSTPROCEDURAL STATES: Chronic | ICD-10-CM

## 2024-11-06 DIAGNOSIS — D64.9 ANEMIA, UNSPECIFIED: ICD-10-CM

## 2024-11-06 DIAGNOSIS — E83.110 HEREDITARY HEMOCHROMATOSIS: ICD-10-CM

## 2024-11-06 LAB
HCT VFR BLD CALC: 40.9 %
HGB BLD-MCNC: 13.9 G/DL
MCHC RBC-ENTMCNC: 32.8 PG
MCHC RBC-ENTMCNC: 34 G/DL
MCV RBC AUTO: 96.5 FL
PLATELET # BLD AUTO: 164 K/UL
PMV BLD: 10.9 FL
RBC # BLD: 4.24 M/UL
RBC # FLD: 12.4 %
WBC # FLD AUTO: 4.81 K/UL

## 2024-11-06 PROCEDURE — 36415 COLL VENOUS BLD VENIPUNCTURE: CPT

## 2024-11-06 PROCEDURE — 99213 OFFICE O/P EST LOW 20 MIN: CPT

## 2024-11-06 PROCEDURE — 82728 ASSAY OF FERRITIN: CPT

## 2024-11-06 PROCEDURE — 85027 COMPLETE CBC AUTOMATED: CPT

## 2024-11-06 PROCEDURE — 83550 IRON BINDING TEST: CPT

## 2024-11-06 PROCEDURE — 83540 ASSAY OF IRON: CPT

## 2024-11-06 PROCEDURE — G2211 COMPLEX E/M VISIT ADD ON: CPT

## 2024-11-07 DIAGNOSIS — D64.9 ANEMIA, UNSPECIFIED: ICD-10-CM

## 2024-11-07 LAB
FERRITIN SERPL-MCNC: 76 NG/ML
IRON SATN MFR SERPL: 61 %
IRON SERPL-MCNC: 205 UG/DL
TIBC SERPL-MCNC: 335 UG/DL
UIBC SERPL-MCNC: 130 UG/DL

## 2024-11-27 ENCOUNTER — APPOINTMENT (OUTPATIENT)
Age: 59
End: 2024-11-27

## 2024-12-16 NOTE — PRE-ANESTHESIA EVALUATION ADULT - NSPREOPDXFT_GEN_ALL_CORE
Abd Pain. [Mother] : mother [Well-balanced] : well-balanced [Normal] : Normal [Yellow] : yellow [Seedy] : seedy [In Bassinet/Crib] : sleeps in bassinet/crib [Co-sleeping] : no co-sleeping [Sleeps 12-16 hours per 24 hours (including naps)] : sleeps 12-16 hours per 24 hours (including naps) [Pacifier use] : Pacifier use [No] : No cigarette smoke exposure [Exposure to electronic nicotine delivery system] : No exposure to electronic nicotine delivery system [Water heater temperature set at <120 degrees F] : Water heater temperature set at <120 degrees F [Rear facing car seat in back seat] : Rear facing car seat in back seat [Carbon Monoxide Detectors] : Carbon monoxide detectors at home [Smoke Detectors] : Smoke detectors at home. [FreeTextEntry1] : 4 mo WC  feeding 4 oz every 2-3 hours formula -sim sen  normal stools and wet diapers  gassiness resolved well now  sleeps from 6-7 to 1030 moriah wakes and is fussy at this times  no spit up  doing tummy time  coos / tracks and smiles

## 2024-12-20 ENCOUNTER — APPOINTMENT (OUTPATIENT)
Dept: OPHTHALMOLOGY | Facility: CLINIC | Age: 59
End: 2024-12-20
Payer: MEDICARE

## 2024-12-20 ENCOUNTER — OUTPATIENT (OUTPATIENT)
Dept: OUTPATIENT SERVICES | Facility: HOSPITAL | Age: 59
LOS: 1 days | End: 2024-12-20
Payer: MEDICARE

## 2024-12-20 DIAGNOSIS — H57.03 MIOSIS: ICD-10-CM

## 2024-12-20 DIAGNOSIS — H53.8 OTHER VISUAL DISTURBANCES: ICD-10-CM

## 2024-12-20 DIAGNOSIS — H25.041 POSTERIOR SUBCAPSULAR POLAR AGE-RELATED CATARACT, RIGHT EYE: ICD-10-CM

## 2024-12-20 DIAGNOSIS — H35.033 HYPERTENSIVE RETINOPATHY, BILATERAL: ICD-10-CM

## 2024-12-20 DIAGNOSIS — H40.1132 PRIMARY OPEN-ANGLE GLAUCOMA, BILATERAL, MODERATE STAGE: ICD-10-CM

## 2024-12-20 DIAGNOSIS — H31.023 SOLAR RETINOPATHY, BILATERAL: ICD-10-CM

## 2024-12-20 DIAGNOSIS — Z98.890 OTHER SPECIFIED POSTPROCEDURAL STATES: Chronic | ICD-10-CM

## 2024-12-20 PROCEDURE — 92134 CPTRZ OPH DX IMG PST SGM RTA: CPT | Mod: 26

## 2024-12-20 PROCEDURE — 92134 CPTRZ OPH DX IMG PST SGM RTA: CPT

## 2024-12-20 PROCEDURE — 92014 COMPRE OPH EXAM EST PT 1/>: CPT

## 2025-01-13 ENCOUNTER — OUTPATIENT (OUTPATIENT)
Dept: OUTPATIENT SERVICES | Facility: HOSPITAL | Age: 60
LOS: 1 days | End: 2025-01-13
Payer: MEDICARE

## 2025-01-13 ENCOUNTER — APPOINTMENT (OUTPATIENT)
Dept: HEPATOLOGY | Facility: CLINIC | Age: 60
End: 2025-01-13
Payer: MEDICARE

## 2025-01-13 ENCOUNTER — LABORATORY RESULT (OUTPATIENT)
Age: 60
End: 2025-01-13

## 2025-01-13 VITALS
BODY MASS INDEX: 25.77 KG/M2 | WEIGHT: 174 LBS | SYSTOLIC BLOOD PRESSURE: 120 MMHG | DIASTOLIC BLOOD PRESSURE: 84 MMHG | HEART RATE: 87 BPM | OXYGEN SATURATION: 95 % | TEMPERATURE: 98 F | HEIGHT: 69 IN

## 2025-01-13 DIAGNOSIS — Z98.890 OTHER SPECIFIED POSTPROCEDURAL STATES: Chronic | ICD-10-CM

## 2025-01-13 DIAGNOSIS — Z00.00 ENCOUNTER FOR GENERAL ADULT MEDICAL EXAMINATION WITHOUT ABNORMAL FINDINGS: ICD-10-CM

## 2025-01-13 DIAGNOSIS — E83.110 HEREDITARY HEMOCHROMATOSIS: ICD-10-CM

## 2025-01-13 DIAGNOSIS — Z00.00 ENCOUNTER FOR GENERAL ADULT MEDICAL EXAMINATION W/OUT ABNORMAL FINDINGS: ICD-10-CM

## 2025-01-13 PROCEDURE — 86704 HEP B CORE ANTIBODY TOTAL: CPT

## 2025-01-13 PROCEDURE — 99204 OFFICE O/P NEW MOD 45 MIN: CPT

## 2025-01-13 PROCEDURE — 86803 HEPATITIS C AB TEST: CPT

## 2025-01-13 PROCEDURE — 86706 HEP B SURFACE ANTIBODY: CPT

## 2025-01-13 PROCEDURE — 86708 HEPATITIS A ANTIBODY: CPT

## 2025-01-13 PROCEDURE — 87340 HEPATITIS B SURFACE AG IA: CPT

## 2025-01-14 LAB
HBV SURFACE AB SER QL: NONREACTIVE
HBV SURFACE AG SER QL: NONREACTIVE
HCV AB SER QL: NONREACTIVE
HCV S/CO RATIO: 0.05 COI
HEPATITIS A IGG ANTIBODY: NONREACTIVE

## 2025-01-15 DIAGNOSIS — E83.110 HEREDITARY HEMOCHROMATOSIS: ICD-10-CM

## 2025-01-15 DIAGNOSIS — Z00.00 ENCOUNTER FOR GENERAL ADULT MEDICAL EXAMINATION WITHOUT ABNORMAL FINDINGS: ICD-10-CM

## 2025-02-03 NOTE — ASU DISCHARGE PLAN (ADULT/PEDIATRIC) - NS DC INTERPRETER YES NO
XELJANZ) 5 MG tablet  -  Approved  Received: 3 days ago  Norma Salinas D Anatoliy Nurse Msg Pool  XELJANZ) 5 MG tablet  -  Approved - Ready to fill     Authorization not required by insurance     The script has been released to [facility] [facility], [place] [address]     Prescription Insurance: [place] Medicaid  Type of Coverage: Medicaid  Patient's Co-Pay: $3     Co pay Assistance Information                  Assistance not required.       Additional Information:  Spoke to Patient,  they are aware of approval and filling pharmacy.    Spoke to patient states that they are moving tomorrow and would like medication to be sent to a [facility] near their new address             Norma Salinas  1/31/25  
No

## 2025-02-06 ENCOUNTER — APPOINTMENT (OUTPATIENT)
Age: 60
End: 2025-02-06
Payer: MEDICARE

## 2025-02-06 ENCOUNTER — LABORATORY RESULT (OUTPATIENT)
Age: 60
End: 2025-02-06

## 2025-02-06 ENCOUNTER — OUTPATIENT (OUTPATIENT)
Dept: OUTPATIENT SERVICES | Facility: HOSPITAL | Age: 60
LOS: 1 days | End: 2025-02-06
Payer: MEDICARE

## 2025-02-06 VITALS
SYSTOLIC BLOOD PRESSURE: 134 MMHG | OXYGEN SATURATION: 100 % | TEMPERATURE: 97.9 F | DIASTOLIC BLOOD PRESSURE: 86 MMHG | HEIGHT: 69 IN | WEIGHT: 171 LBS | HEART RATE: 85 BPM | RESPIRATION RATE: 16 BRPM | BODY MASS INDEX: 25.33 KG/M2

## 2025-02-06 DIAGNOSIS — E83.110 HEREDITARY HEMOCHROMATOSIS: ICD-10-CM

## 2025-02-06 DIAGNOSIS — Z98.890 OTHER SPECIFIED POSTPROCEDURAL STATES: Chronic | ICD-10-CM

## 2025-02-06 PROCEDURE — 99214 OFFICE O/P EST MOD 30 MIN: CPT

## 2025-02-06 PROCEDURE — 85027 COMPLETE CBC AUTOMATED: CPT

## 2025-02-06 PROCEDURE — 83550 IRON BINDING TEST: CPT

## 2025-02-06 PROCEDURE — G2211 COMPLEX E/M VISIT ADD ON: CPT

## 2025-02-06 PROCEDURE — 83540 ASSAY OF IRON: CPT

## 2025-02-06 PROCEDURE — 99204 OFFICE O/P NEW MOD 45 MIN: CPT

## 2025-02-06 PROCEDURE — 82728 ASSAY OF FERRITIN: CPT

## 2025-02-07 DIAGNOSIS — E83.110 HEREDITARY HEMOCHROMATOSIS: ICD-10-CM

## 2025-02-07 LAB
FERRITIN SERPL-MCNC: 166 NG/ML
HCT VFR BLD CALC: 43.8 %
HGB BLD-MCNC: 14.7 G/DL
IRON SATN MFR SERPL: 94 %
IRON SERPL-MCNC: 308 UG/DL
MCHC RBC-ENTMCNC: 33.2 PG
MCHC RBC-ENTMCNC: 33.6 G/DL
MCV RBC AUTO: 98.9 FL
PLATELET # BLD AUTO: 164 K/UL
PMV BLD: 10.6 FL
RBC # BLD: 4.43 M/UL
RBC # FLD: 13.2 %
TIBC SERPL-MCNC: 329 UG/DL
UIBC SERPL-MCNC: 21 UG/DL
WBC # FLD AUTO: 7.88 K/UL

## 2025-02-21 ENCOUNTER — OUTPATIENT (OUTPATIENT)
Dept: OUTPATIENT SERVICES | Facility: HOSPITAL | Age: 60
LOS: 1 days | End: 2025-02-21
Payer: COMMERCIAL

## 2025-02-21 ENCOUNTER — APPOINTMENT (OUTPATIENT)
Age: 60
End: 2025-02-21

## 2025-02-21 DIAGNOSIS — Z98.890 OTHER SPECIFIED POSTPROCEDURAL STATES: Chronic | ICD-10-CM

## 2025-02-21 DIAGNOSIS — E83.110 HEREDITARY HEMOCHROMATOSIS: ICD-10-CM

## 2025-02-21 PROCEDURE — 99195 PHLEBOTOMY: CPT

## 2025-02-21 PROCEDURE — 85025 COMPLETE CBC W/AUTO DIFF WBC: CPT

## 2025-02-21 PROCEDURE — 82728 ASSAY OF FERRITIN: CPT

## 2025-02-21 PROCEDURE — 36415 COLL VENOUS BLD VENIPUNCTURE: CPT

## 2025-02-26 LAB
AUTO BASOPHILS #: 0.02 K/UL
AUTO BASOPHILS %: 0.3 %
AUTO EOSINOPHILS #: 0.09 K/UL
AUTO EOSINOPHILS %: 1.5 %
AUTO IMMATURE GRANULOCYTES #: 0.05 K/UL
AUTO LYMPHOCYTES #: 2.37 K/UL
AUTO LYMPHOCYTES %: 39.8 %
AUTO MONOCYTES #: 0.61 K/UL
AUTO MONOCYTES %: 10.2 %
AUTO NEUTROPHILS #: 2.82 K/UL
AUTO NEUTROPHILS %: 47.4 %
AUTO NRBC #: 0 K/UL
FERRITIN SERPL-MCNC: 152 NG/ML
HCT VFR BLD CALC: 41.4 %
HGB BLD-MCNC: 14.4 G/DL
IMM GRANULOCYTES NFR BLD AUTO: 0.8 %
IMMATURE PLATELET FRACTION #: 9.1 K/UL
IMMATURE PLATELET FRACTION %: 6.7 %
MAN DIFF?: NORMAL
MCHC RBC-ENTMCNC: 34.2 PG
MCHC RBC-ENTMCNC: 34.8 G/DL
MCV RBC AUTO: 98.3 FL
PLATELET # BLD AUTO: 136 K/UL
PMV BLD AUTO: 0 /100 WBCS
PMV BLD: 11 FL
RBC # BLD: 4.21 M/UL
RBC # FLD: 13 %
WBC # FLD AUTO: 5.96 K/UL

## 2025-03-07 ENCOUNTER — APPOINTMENT (OUTPATIENT)
Age: 60
End: 2025-03-07

## 2025-03-07 ENCOUNTER — OUTPATIENT (OUTPATIENT)
Dept: OUTPATIENT SERVICES | Facility: HOSPITAL | Age: 60
LOS: 1 days | End: 2025-03-07
Payer: COMMERCIAL

## 2025-03-07 DIAGNOSIS — D64.9 ANEMIA, UNSPECIFIED: ICD-10-CM

## 2025-03-07 DIAGNOSIS — Z98.890 OTHER SPECIFIED POSTPROCEDURAL STATES: Chronic | ICD-10-CM

## 2025-03-07 PROCEDURE — 82728 ASSAY OF FERRITIN: CPT

## 2025-03-07 PROCEDURE — 36415 COLL VENOUS BLD VENIPUNCTURE: CPT

## 2025-03-07 PROCEDURE — 85025 COMPLETE CBC W/AUTO DIFF WBC: CPT

## 2025-03-07 PROCEDURE — 99195 PHLEBOTOMY: CPT

## 2025-03-08 DIAGNOSIS — D64.9 ANEMIA, UNSPECIFIED: ICD-10-CM

## 2025-03-13 ENCOUNTER — OUTPATIENT (OUTPATIENT)
Dept: OUTPATIENT SERVICES | Facility: HOSPITAL | Age: 60
LOS: 1 days | End: 2025-03-13
Payer: MEDICARE

## 2025-03-13 ENCOUNTER — APPOINTMENT (OUTPATIENT)
Dept: INTERNAL MEDICINE | Facility: CLINIC | Age: 60
End: 2025-03-13
Payer: MEDICARE

## 2025-03-13 VITALS
OXYGEN SATURATION: 99 % | HEART RATE: 77 BPM | HEIGHT: 69 IN | SYSTOLIC BLOOD PRESSURE: 118 MMHG | BODY MASS INDEX: 25.77 KG/M2 | TEMPERATURE: 96.8 F | WEIGHT: 174 LBS | DIASTOLIC BLOOD PRESSURE: 75 MMHG

## 2025-03-13 DIAGNOSIS — E78.5 HYPERLIPIDEMIA, UNSPECIFIED: ICD-10-CM

## 2025-03-13 DIAGNOSIS — M10.9 GOUT, UNSPECIFIED: ICD-10-CM

## 2025-03-13 DIAGNOSIS — Z00.00 ENCOUNTER FOR GENERAL ADULT MEDICAL EXAMINATION WITHOUT ABNORMAL FINDINGS: ICD-10-CM

## 2025-03-13 DIAGNOSIS — I10 ESSENTIAL (PRIMARY) HYPERTENSION: ICD-10-CM

## 2025-03-13 DIAGNOSIS — M62.81 MUSCLE WEAKNESS (GENERALIZED): ICD-10-CM

## 2025-03-13 DIAGNOSIS — G40.909 EPILEPSY, UNSPECIFIED, NOT INTRACTABLE, W/OUT STATUS EPILEPTICUS: ICD-10-CM

## 2025-03-13 DIAGNOSIS — F20.9 SCHIZOPHRENIA, UNSPECIFIED: ICD-10-CM

## 2025-03-13 DIAGNOSIS — E83.110 HEREDITARY HEMOCHROMATOSIS: ICD-10-CM

## 2025-03-13 DIAGNOSIS — Z98.890 OTHER SPECIFIED POSTPROCEDURAL STATES: Chronic | ICD-10-CM

## 2025-03-13 LAB
AUTO BASOPHILS #: 0.02 K/UL
AUTO BASOPHILS %: 0.4 %
AUTO EOSINOPHILS #: 0.07 K/UL
AUTO EOSINOPHILS %: 1.2 %
AUTO IMMATURE GRANULOCYTES #: 0.02 K/UL
AUTO LYMPHOCYTES #: 1.64 K/UL
AUTO LYMPHOCYTES %: 29.2 %
AUTO MONOCYTES #: 0.7 K/UL
AUTO MONOCYTES %: 12.5 %
AUTO NEUTROPHILS #: 3.17 K/UL
AUTO NEUTROPHILS %: 56.3 %
AUTO NRBC #: 0 K/UL
FERRITIN SERPL-MCNC: 150 NG/ML
HCT VFR BLD CALC: 40 %
HGB BLD-MCNC: 13.7 G/DL
IMM GRANULOCYTES NFR BLD AUTO: 0.4 %
MAN DIFF?: NORMAL
MCHC RBC-ENTMCNC: 33.8 PG
MCHC RBC-ENTMCNC: 34.3 G/DL
MCV RBC AUTO: 98.8 FL
PLATELET # BLD AUTO: 145 K/UL
PMV BLD AUTO: 0 /100 WBCS
PMV BLD: 10.6 FL
RBC # BLD: 4.05 M/UL
RBC # FLD: 12.7 %
WBC # FLD AUTO: 5.62 K/UL

## 2025-03-13 PROCEDURE — G2211 COMPLEX E/M VISIT ADD ON: CPT

## 2025-03-13 PROCEDURE — 99214 OFFICE O/P EST MOD 30 MIN: CPT

## 2025-03-14 ENCOUNTER — OUTPATIENT (OUTPATIENT)
Dept: OUTPATIENT SERVICES | Facility: HOSPITAL | Age: 60
LOS: 1 days | End: 2025-03-14
Payer: MEDICARE

## 2025-03-14 DIAGNOSIS — Z00.00 ENCOUNTER FOR GENERAL ADULT MEDICAL EXAMINATION WITHOUT ABNORMAL FINDINGS: ICD-10-CM

## 2025-03-14 DIAGNOSIS — Z98.890 OTHER SPECIFIED POSTPROCEDURAL STATES: Chronic | ICD-10-CM

## 2025-03-14 PROCEDURE — 81003 URINALYSIS AUTO W/O SCOPE: CPT

## 2025-03-14 PROCEDURE — 82550 ASSAY OF CK (CPK): CPT

## 2025-03-15 DIAGNOSIS — Z00.00 ENCOUNTER FOR GENERAL ADULT MEDICAL EXAMINATION WITHOUT ABNORMAL FINDINGS: ICD-10-CM

## 2025-03-17 LAB
APPEARANCE: CLEAR
BILIRUBIN URINE: NEGATIVE
BLOOD URINE: NEGATIVE
CK SERPL-CCNC: 162 U/L
COLOR: YELLOW
GLUCOSE QUALITATIVE U: NEGATIVE MG/DL
KETONES URINE: NEGATIVE MG/DL
LEUKOCYTE ESTERASE URINE: NEGATIVE
NITRITE URINE: NEGATIVE
PH URINE: 7
PROTEIN URINE: NEGATIVE MG/DL
SPECIFIC GRAVITY URINE: 1.02
UROBILINOGEN URINE: 1 MG/DL

## 2025-03-20 DIAGNOSIS — M10.9 GOUT, UNSPECIFIED: ICD-10-CM

## 2025-03-20 DIAGNOSIS — M62.81 MUSCLE WEAKNESS (GENERALIZED): ICD-10-CM

## 2025-03-20 DIAGNOSIS — G40.909 EPILEPSY, UNSPECIFIED, NOT INTRACTABLE, WITHOUT STATUS EPILEPTICUS: ICD-10-CM

## 2025-03-20 DIAGNOSIS — E78.5 HYPERLIPIDEMIA, UNSPECIFIED: ICD-10-CM

## 2025-03-20 DIAGNOSIS — I10 ESSENTIAL (PRIMARY) HYPERTENSION: ICD-10-CM

## 2025-03-20 DIAGNOSIS — F20.9 SCHIZOPHRENIA, UNSPECIFIED: ICD-10-CM

## 2025-03-20 DIAGNOSIS — E83.110 HEREDITARY HEMOCHROMATOSIS: ICD-10-CM

## 2025-03-21 ENCOUNTER — APPOINTMENT (OUTPATIENT)
Age: 60
End: 2025-03-21

## 2025-03-21 ENCOUNTER — OUTPATIENT (OUTPATIENT)
Dept: OUTPATIENT SERVICES | Facility: HOSPITAL | Age: 60
LOS: 1 days | End: 2025-03-21
Payer: COMMERCIAL

## 2025-03-21 DIAGNOSIS — D64.9 ANEMIA, UNSPECIFIED: ICD-10-CM

## 2025-03-21 DIAGNOSIS — Z98.890 OTHER SPECIFIED POSTPROCEDURAL STATES: Chronic | ICD-10-CM

## 2025-03-21 LAB
AUTO BASOPHILS #: 0.04 K/UL
AUTO BASOPHILS %: 0.7 %
AUTO EOSINOPHILS #: 0.06 K/UL
AUTO EOSINOPHILS %: 1 %
AUTO IMMATURE GRANULOCYTES #: 0.02 K/UL
AUTO LYMPHOCYTES #: 1.96 K/UL
AUTO LYMPHOCYTES %: 32.3 %
AUTO MONOCYTES #: 0.68 K/UL
AUTO MONOCYTES %: 11.2 %
AUTO NEUTROPHILS #: 3.31 K/UL
AUTO NEUTROPHILS %: 54.5 %
AUTO NRBC #: 0 K/UL
HCT VFR BLD CALC: 40.8 %
HGB BLD-MCNC: 14 G/DL
IMM GRANULOCYTES NFR BLD AUTO: 0.3 %
MAN DIFF?: NORMAL
MCHC RBC-ENTMCNC: 34.3 G/DL
MCHC RBC-ENTMCNC: 34.3 PG
MCV RBC AUTO: 100 FL
PLATELET # BLD AUTO: 167 K/UL
PMV BLD AUTO: 0 /100 WBCS
PMV BLD: 10.2 FL
RBC # BLD: 4.08 M/UL
RBC # FLD: 12.6 %
WBC # FLD AUTO: 6.07 K/UL

## 2025-03-21 PROCEDURE — 82728 ASSAY OF FERRITIN: CPT

## 2025-03-21 PROCEDURE — 85025 COMPLETE CBC W/AUTO DIFF WBC: CPT

## 2025-03-21 PROCEDURE — 99195 PHLEBOTOMY: CPT

## 2025-03-21 PROCEDURE — 36415 COLL VENOUS BLD VENIPUNCTURE: CPT

## 2025-03-22 LAB — FERRITIN SERPL-MCNC: 103 NG/ML

## 2025-04-04 ENCOUNTER — APPOINTMENT (OUTPATIENT)
Age: 60
End: 2025-04-04

## 2025-04-04 ENCOUNTER — OUTPATIENT (OUTPATIENT)
Dept: OUTPATIENT SERVICES | Facility: HOSPITAL | Age: 60
LOS: 1 days | End: 2025-04-04
Payer: COMMERCIAL

## 2025-04-04 DIAGNOSIS — D64.9 ANEMIA, UNSPECIFIED: ICD-10-CM

## 2025-04-04 DIAGNOSIS — Z98.890 OTHER SPECIFIED POSTPROCEDURAL STATES: Chronic | ICD-10-CM

## 2025-04-04 LAB
AUTO BASOPHILS #: 0.02 K/UL
AUTO BASOPHILS %: 0.4 %
AUTO EOSINOPHILS #: 0.04 K/UL
AUTO EOSINOPHILS %: 0.8 %
AUTO IMMATURE GRANULOCYTES #: 0.02 K/UL
AUTO LYMPHOCYTES #: 1.79 K/UL
AUTO LYMPHOCYTES %: 35.4 %
AUTO MONOCYTES #: 0.41 K/UL
AUTO MONOCYTES %: 8.1 %
AUTO NEUTROPHILS #: 2.78 K/UL
AUTO NEUTROPHILS %: 54.9 %
AUTO NRBC #: 0 K/UL
HCT VFR BLD CALC: 37.7 %
HGB BLD-MCNC: 12.7 G/DL
IMM GRANULOCYTES NFR BLD AUTO: 0.4 %
MAN DIFF?: NORMAL
MCHC RBC-ENTMCNC: 33.4 PG
MCHC RBC-ENTMCNC: 33.7 G/DL
MCV RBC AUTO: 99.2 FL
PLATELET # BLD AUTO: 167 K/UL
PMV BLD AUTO: 0 /100 WBCS
PMV BLD: 10.3 FL
RBC # BLD: 3.8 M/UL
RBC # FLD: 12.1 %
WBC # FLD AUTO: 5.06 K/UL

## 2025-04-04 PROCEDURE — 82728 ASSAY OF FERRITIN: CPT

## 2025-04-04 PROCEDURE — 85025 COMPLETE CBC W/AUTO DIFF WBC: CPT

## 2025-04-04 PROCEDURE — 36415 COLL VENOUS BLD VENIPUNCTURE: CPT

## 2025-04-05 DIAGNOSIS — D64.9 ANEMIA, UNSPECIFIED: ICD-10-CM

## 2025-04-07 LAB — FERRITIN SERPL-MCNC: 61 NG/ML

## 2025-04-09 ENCOUNTER — RESULT REVIEW (OUTPATIENT)
Age: 60
End: 2025-04-09

## 2025-04-09 ENCOUNTER — OUTPATIENT (OUTPATIENT)
Dept: OUTPATIENT SERVICES | Facility: HOSPITAL | Age: 60
LOS: 1 days | End: 2025-04-09
Payer: COMMERCIAL

## 2025-04-09 DIAGNOSIS — Z98.890 OTHER SPECIFIED POSTPROCEDURAL STATES: Chronic | ICD-10-CM

## 2025-04-09 DIAGNOSIS — M25.559 PAIN IN UNSPECIFIED HIP: ICD-10-CM

## 2025-04-09 PROCEDURE — 73562 X-RAY EXAM OF KNEE 3: CPT | Mod: 26,50

## 2025-04-09 PROCEDURE — 73521 X-RAY EXAM HIPS BI 2 VIEWS: CPT

## 2025-04-09 PROCEDURE — 72100 X-RAY EXAM L-S SPINE 2/3 VWS: CPT | Mod: 26

## 2025-04-09 PROCEDURE — 72200 X-RAY EXAM SI JOINTS: CPT | Mod: 26

## 2025-04-09 PROCEDURE — 72200 X-RAY EXAM SI JOINTS: CPT

## 2025-04-09 PROCEDURE — 73562 X-RAY EXAM OF KNEE 3: CPT | Mod: 50

## 2025-04-09 PROCEDURE — 72100 X-RAY EXAM L-S SPINE 2/3 VWS: CPT

## 2025-04-09 PROCEDURE — 73521 X-RAY EXAM HIPS BI 2 VIEWS: CPT | Mod: 26

## 2025-04-10 DIAGNOSIS — M25.559 PAIN IN UNSPECIFIED HIP: ICD-10-CM

## 2025-04-10 DIAGNOSIS — M54.59 OTHER LOW BACK PAIN: ICD-10-CM

## 2025-04-22 ENCOUNTER — EMERGENCY (EMERGENCY)
Facility: HOSPITAL | Age: 60
LOS: 0 days | Discharge: HOME | End: 2025-04-22
Attending: STUDENT IN AN ORGANIZED HEALTH CARE EDUCATION/TRAINING PROGRAM
Payer: MEDICARE

## 2025-04-22 VITALS
OXYGEN SATURATION: 99 % | DIASTOLIC BLOOD PRESSURE: 85 MMHG | HEART RATE: 55 BPM | WEIGHT: 175.05 LBS | TEMPERATURE: 98 F | RESPIRATION RATE: 18 BRPM | SYSTOLIC BLOOD PRESSURE: 132 MMHG

## 2025-04-22 DIAGNOSIS — R53.1 WEAKNESS: ICD-10-CM

## 2025-04-22 DIAGNOSIS — M79.651 PAIN IN RIGHT THIGH: ICD-10-CM

## 2025-04-22 DIAGNOSIS — I10 ESSENTIAL (PRIMARY) HYPERTENSION: ICD-10-CM

## 2025-04-22 DIAGNOSIS — Z98.890 OTHER SPECIFIED POSTPROCEDURAL STATES: Chronic | ICD-10-CM

## 2025-04-22 DIAGNOSIS — M79.652 PAIN IN LEFT THIGH: ICD-10-CM

## 2025-04-22 PROCEDURE — 99284 EMERGENCY DEPT VISIT MOD MDM: CPT | Mod: FS

## 2025-04-22 PROCEDURE — 99282 EMERGENCY DEPT VISIT SF MDM: CPT

## 2025-04-22 RX ORDER — KETOROLAC TROMETHAMINE 30 MG/ML
30 INJECTION, SOLUTION INTRAMUSCULAR; INTRAVENOUS ONCE
Refills: 0 | Status: COMPLETED | OUTPATIENT
Start: 2025-04-22 | End: 2025-04-22

## 2025-04-22 NOTE — ED PROVIDER NOTE - OBJECTIVE STATEMENT
59-year-old male with past medical history of hypertension, seizures, gout presents complaining of bilateral leg weakness for over a month.  Patient denies any fall/injury/trauma.  Patient states to have followed-up with his neurologist and PCP who did not find any abnormalities. pt denies any other symptoms including fevers, chill, headache, recent illness/travel, cough, abdominal pain, chest pain, or SOB.

## 2025-04-22 NOTE — ED PROVIDER NOTE - BIRTH SEX
MATERNAL FETAL Prenatal Vit-Fe Fumarate-FA (Prenatal pregnancy being followed by [facility] 
Male

## 2025-04-22 NOTE — ED PROVIDER NOTE - NSFOLLOWUPINSTRUCTIONS_ED_ALL_ED_FT
Please follow up with your primary care physician within 24-72 hours and return immediately if symptoms worsen.    Our Emergency Department Referral Coordinators will be reaching out to you in the next 24-48 hours from 9:00am to 5:00pm with a follow up appointment. Please expect a phone call from the hospital in that time frame. If you do not receive a call or if you have any questions or concerns, you can reach them at   [phone]    Weakness    WHAT YOU NEED TO KNOW:    Weakness is a loss of muscle strength. It may be caused by brain, nerve, or muscle problems. Physical and mental conditions such as heart problems, pregnancy, dehydration, or depression may also cause weakness. Reactions to certain drugs can cause weakness. Parts of your body may become weak if you need to wear a cast or splint or have been on bed rest for a long time.    DISCHARGE INSTRUCTIONS:    Call 911 for any of the following:     You have any of the following signs of a stroke:   Numbness or drooping on one side of your face       Weakness in an arm or leg      Confusion or difficulty speaking      Dizziness, a severe headache, or vision loss      You lose feeling in your weakened body area.      You have electric shock-like feelings down your arms and legs when you flex or move your neck.      You have sudden or increased trouble speaking, swallowing, or breathing.    Return to the emergency department if:     You have severe pain in your back, arms, or legs that worsens.      You have sudden or worsened muscle weakness or loss of movement.      You are not able to control when you urinate or have a bowel movement.    Contact your healthcare provider if:     You feel depressed or anxious.       You have questions or concerns about your condition or care.     Manage weakness:     Use assistive devices as directed. These help protect you from injury. Examples include a walker or cane. Have someone install handrails in your home. These will help you get out of a bathtub or stand up from a toilet. Use a shower chair so you can sit while you shower. Sit down on the toilet or another chair to dry off and put on your clothes. Get help going up and down stairs if your legs are weak.       Go to physical or occupational therapy if directed. A physical therapist can teach you exercises to help strengthen weak muscles. An occupational therapist can show you ways to do your daily activities more easily. For example, light forks and spoons can be easier to use if you have hand weakness. You may also learn ways to organize your household items so you are not moving heavy items.      Balance rest with exercise. Exercise can help increase your muscle strength and energy. Do not exercise for long periods at a time. Take breaks often to rest. Too much exercise can cause muscle strain or make you more tired. Ask your healthcare provider how much exercise is right for you.      Eat a variety of healthy foods. Too much or too little food may cause weakness or tiredness. Ask your healthcare provider what a healthy amount of food is for you. Healthy foods include fruits, vegetables, whole-grain breads, low-fat dairy products, lean meats and fish, nuts, and cooked beans.      Do not smoke. Nicotine and other chemicals in cigarettes and cigars can make your symptoms worse, and can cause lung damage. Ask your healthcare provider for information if you currently smoke and need help to quit. E-cigarettes or smokeless tobacco still contain nicotine. Talk to your healthcare provider before you use these products.       Do not use caffeine, alcohol, or illegal drugs. These may cause muscle twitching, which could lead to worsened weakness.     Follow up with your healthcare provider as directed: Write down your questions so you remember to ask them during your visits.

## 2025-04-22 NOTE — ED PROVIDER NOTE - PATIENT PORTAL LINK FT
You can access the FollowMyHealth Patient Portal offered by [facility] by registering at the following website: [email or web address]. By joining [employer]’s FollowMyHealth portal, you will also be able to view your health information using other applications (apps) compatible with our system.

## 2025-04-22 NOTE — ED PROVIDER NOTE - CLINICAL SUMMARY MEDICAL DECISION MAKING FREE TEXT BOX
59-year-old presents today for evaluation of bilateral leg pain to his bilateral thighs.  Patient has pain when he is walking.  Unclear etiology as patient has been seen by neurologist and his primary care physician.  No tenderness to palpation on my evaluation.  Patient is ambulatory with steady gait and endorses no pain when ambulating.  Patient may have pain secondary to medication use and was instructed to have follow-up with his primary care physician to evaluate for underlying medication side effect.

## 2025-04-24 NOTE — CHART NOTE - NSCHARTNOTEFT_GEN_A_CORE
VM BOX FULL 4/23 - JL. Unable to leave v/m, mailbox full, CT 4/24. Left v/m on other number, CT 4/24 (PT referral).

## 2025-04-25 ENCOUNTER — OUTPATIENT (OUTPATIENT)
Dept: OUTPATIENT SERVICES | Facility: HOSPITAL | Age: 60
LOS: 1 days | End: 2025-04-25
Payer: MEDICARE

## 2025-04-25 ENCOUNTER — APPOINTMENT (OUTPATIENT)
Dept: OPHTHALMOLOGY | Facility: CLINIC | Age: 60
End: 2025-04-25
Payer: MEDICARE

## 2025-04-25 DIAGNOSIS — H53.8 OTHER VISUAL DISTURBANCES: ICD-10-CM

## 2025-04-25 DIAGNOSIS — Z98.890 OTHER SPECIFIED POSTPROCEDURAL STATES: Chronic | ICD-10-CM

## 2025-04-25 PROCEDURE — 99214 OFFICE O/P EST MOD 30 MIN: CPT

## 2025-04-27 ENCOUNTER — EMERGENCY (EMERGENCY)
Facility: HOSPITAL | Age: 60
LOS: 0 days | Discharge: HOME | End: 2025-04-27
Attending: EMERGENCY MEDICINE
Payer: COMMERCIAL

## 2025-04-27 VITALS
SYSTOLIC BLOOD PRESSURE: 147 MMHG | HEART RATE: 85 BPM | OXYGEN SATURATION: 98 % | RESPIRATION RATE: 18 BRPM | TEMPERATURE: 97 F | DIASTOLIC BLOOD PRESSURE: 90 MMHG

## 2025-04-27 VITALS
SYSTOLIC BLOOD PRESSURE: 143 MMHG | TEMPERATURE: 98 F | HEART RATE: 71 BPM | OXYGEN SATURATION: 98 % | RESPIRATION RATE: 18 BRPM | DIASTOLIC BLOOD PRESSURE: 77 MMHG

## 2025-04-27 DIAGNOSIS — Z98.890 OTHER SPECIFIED POSTPROCEDURAL STATES: Chronic | ICD-10-CM

## 2025-04-27 DIAGNOSIS — R29.898 OTHER SYMPTOMS AND SIGNS INVOLVING THE MUSCULOSKELETAL SYSTEM: ICD-10-CM

## 2025-04-27 DIAGNOSIS — I10 ESSENTIAL (PRIMARY) HYPERTENSION: ICD-10-CM

## 2025-04-27 LAB
ALBUMIN SERPL ELPH-MCNC: 4.3 G/DL — SIGNIFICANT CHANGE UP (ref 3.5–5.2)
ALP SERPL-CCNC: 60 U/L — SIGNIFICANT CHANGE UP (ref 30–115)
ALT FLD-CCNC: 18 U/L — SIGNIFICANT CHANGE UP (ref 0–41)
ANION GAP SERPL CALC-SCNC: 11 MMOL/L — SIGNIFICANT CHANGE UP (ref 7–14)
AST SERPL-CCNC: 19 U/L — SIGNIFICANT CHANGE UP (ref 0–41)
BASOPHILS # BLD AUTO: 0.02 K/UL — SIGNIFICANT CHANGE UP (ref 0–0.2)
BASOPHILS NFR BLD AUTO: 0.4 % — SIGNIFICANT CHANGE UP (ref 0–1)
BILIRUB SERPL-MCNC: 0.6 MG/DL — SIGNIFICANT CHANGE UP (ref 0.2–1.2)
BUN SERPL-MCNC: 16 MG/DL — SIGNIFICANT CHANGE UP (ref 10–20)
CALCIUM SERPL-MCNC: 9.6 MG/DL — SIGNIFICANT CHANGE UP (ref 8.4–10.5)
CHLORIDE SERPL-SCNC: 106 MMOL/L — SIGNIFICANT CHANGE UP (ref 98–110)
CK SERPL-CCNC: 179 U/L — SIGNIFICANT CHANGE UP (ref 0–225)
CO2 SERPL-SCNC: 25 MMOL/L — SIGNIFICANT CHANGE UP (ref 17–32)
CREAT SERPL-MCNC: 1 MG/DL — SIGNIFICANT CHANGE UP (ref 0.7–1.5)
EGFR: 87 ML/MIN/1.73M2 — SIGNIFICANT CHANGE UP
EGFR: 87 ML/MIN/1.73M2 — SIGNIFICANT CHANGE UP
EOSINOPHIL # BLD AUTO: 0.09 K/UL — SIGNIFICANT CHANGE UP (ref 0–0.7)
EOSINOPHIL NFR BLD AUTO: 1.9 % — SIGNIFICANT CHANGE UP (ref 0–8)
GLUCOSE SERPL-MCNC: 123 MG/DL — HIGH (ref 70–99)
HCT VFR BLD CALC: 38.7 % — LOW (ref 42–52)
HGB BLD-MCNC: 13.4 G/DL — LOW (ref 14–18)
IMM GRANULOCYTES NFR BLD AUTO: 0.2 % — SIGNIFICANT CHANGE UP (ref 0.1–0.3)
LYMPHOCYTES # BLD AUTO: 1.69 K/UL — SIGNIFICANT CHANGE UP (ref 1.2–3.4)
LYMPHOCYTES # BLD AUTO: 35.2 % — SIGNIFICANT CHANGE UP (ref 20.5–51.1)
MAGNESIUM SERPL-MCNC: 2 MG/DL — SIGNIFICANT CHANGE UP (ref 1.8–2.4)
MCHC RBC-ENTMCNC: 34.2 PG — HIGH (ref 27–31)
MCHC RBC-ENTMCNC: 34.6 G/DL — SIGNIFICANT CHANGE UP (ref 32–37)
MCV RBC AUTO: 98.7 FL — HIGH (ref 80–94)
MONOCYTES # BLD AUTO: 0.55 K/UL — SIGNIFICANT CHANGE UP (ref 0.1–0.6)
MONOCYTES NFR BLD AUTO: 11.5 % — HIGH (ref 1.7–9.3)
NEUTROPHILS # BLD AUTO: 2.44 K/UL — SIGNIFICANT CHANGE UP (ref 1.4–6.5)
NEUTROPHILS NFR BLD AUTO: 50.8 % — SIGNIFICANT CHANGE UP (ref 42.2–75.2)
NRBC BLD AUTO-RTO: 0 /100 WBCS — SIGNIFICANT CHANGE UP (ref 0–0)
PLATELET # BLD AUTO: 149 K/UL — SIGNIFICANT CHANGE UP (ref 130–400)
PMV BLD: 11.1 FL — HIGH (ref 7.4–10.4)
POTASSIUM SERPL-MCNC: 4.1 MMOL/L — SIGNIFICANT CHANGE UP (ref 3.5–5)
POTASSIUM SERPL-SCNC: 4.1 MMOL/L — SIGNIFICANT CHANGE UP (ref 3.5–5)
PROT SERPL-MCNC: 6 G/DL — SIGNIFICANT CHANGE UP (ref 6–8)
RBC # BLD: 3.92 M/UL — LOW (ref 4.7–6.1)
RBC # FLD: 12 % — SIGNIFICANT CHANGE UP (ref 11.5–14.5)
SODIUM SERPL-SCNC: 142 MMOL/L — SIGNIFICANT CHANGE UP (ref 135–146)
VALPROATE SERPL-MCNC: 86 UG/ML — SIGNIFICANT CHANGE UP (ref 50–100)
WBC # BLD: 4.8 K/UL — SIGNIFICANT CHANGE UP (ref 4.8–10.8)
WBC # FLD AUTO: 4.8 K/UL — SIGNIFICANT CHANGE UP (ref 4.8–10.8)

## 2025-04-27 PROCEDURE — 99283 EMERGENCY DEPT VISIT LOW MDM: CPT

## 2025-04-27 PROCEDURE — 80164 ASSAY DIPROPYLACETIC ACD TOT: CPT

## 2025-04-27 PROCEDURE — 85025 COMPLETE CBC W/AUTO DIFF WBC: CPT

## 2025-04-27 PROCEDURE — 83735 ASSAY OF MAGNESIUM: CPT

## 2025-04-27 PROCEDURE — 36415 COLL VENOUS BLD VENIPUNCTURE: CPT

## 2025-04-27 PROCEDURE — 82550 ASSAY OF CK (CPK): CPT

## 2025-04-27 PROCEDURE — 80053 COMPREHEN METABOLIC PANEL: CPT

## 2025-04-27 PROCEDURE — 99284 EMERGENCY DEPT VISIT MOD MDM: CPT

## 2025-04-27 NOTE — ED PROVIDER NOTE - CLINICAL SUMMARY MEDICAL DECISION MAKING FREE TEXT BOX
Patient is a 59-year-old male who was seen and examined with the physician assistant.  The patient of leg weakness for several weeks but states this seems little worse.  He is concerned this could be related to his seizure disorder.  On our exam, the patient is able to walk.  No tremors.  No seizure activity.  His labs were checked including a valproic acid level and CPK and were within normal limits.  Patient agreed to follow-up with his outpatient neurologist and was discharged home

## 2025-04-27 NOTE — ED ADULT NURSE NOTE - NSFALLUNIVINTERV_ED_ALL_ED
2 Bed/Stretcher in lowest position, wheels locked, appropriate side rails in place/Call bell, personal items and telephone in reach/Instruct patient to call for assistance before getting out of bed/chair/stretcher/Non-slip footwear applied when patient is off stretcher/[place] to call system/Physically safe environment - no spills, clutter or unnecessary equipment/Purposeful proactive rounding/Room/bathroom lighting operational, light cord in reach

## 2025-04-27 NOTE — ED ADULT TRIAGE NOTE - CHIEF COMPLAINT QUOTE
Presents to ED c/o pain and weakness in bilateral legs worsening over the last 2 days. Reports hx of falls

## 2025-04-27 NOTE — ED PROVIDER NOTE - OBJECTIVE STATEMENT
59-year-old male history of seizures, hypertension complaining of weakness and feeling movements to upper legs.  Patient states that he has had this for several weeks but symptoms got worse a few days ago.  Patient states it feels like his legs are "seizing".  No abdominal pain, back pain, urinary or fecal incontinence.

## 2025-04-27 NOTE — ED PROVIDER NOTE - PATIENT PORTAL LINK FT
You can access the FollowMyHealth Patient Portal offered by [facility] by registering at the following website: [email or web address]. By joining [email or web address]’s FollowMyHealth portal, you will also be able to view your health information using other applications (apps) compatible with our system.

## 2025-04-27 NOTE — ED PROVIDER NOTE - PHYSICAL EXAMINATION
Gen: Alert, NAD, well appearing  Head: NC, AT, PERRL, EOMI, normal lids/conjunctiva  ENT: normal hearing  Neck: +supple, no tenderness/meningismus,  Pulm: Bilateral BS, normal resp effort, no wheeze/stridor/retractions  CV: RRR  Abd: soft, NT/ND  Mskel: no edema/erythema/cyanosis  Skin: no rash, warm/dry  Neuro: AAOx3, no sensory/motor deficits. Ambulatory in ED

## 2025-04-29 ENCOUNTER — OUTPATIENT (OUTPATIENT)
Dept: OUTPATIENT SERVICES | Facility: HOSPITAL | Age: 60
LOS: 1 days | End: 2025-04-29
Payer: COMMERCIAL

## 2025-04-29 ENCOUNTER — APPOINTMENT (OUTPATIENT)
Dept: INTERNAL MEDICINE | Facility: CLINIC | Age: 60
End: 2025-04-29
Payer: COMMERCIAL

## 2025-04-29 VITALS
BODY MASS INDEX: 26 KG/M2 | HEIGHT: 69 IN | HEART RATE: 85 BPM | SYSTOLIC BLOOD PRESSURE: 130 MMHG | DIASTOLIC BLOOD PRESSURE: 84 MMHG | OXYGEN SATURATION: 97 % | TEMPERATURE: 98.1 F | WEIGHT: 175.56 LBS

## 2025-04-29 DIAGNOSIS — G40.909 EPILEPSY, UNSPECIFIED, NOT INTRACTABLE, W/OUT STATUS EPILEPTICUS: ICD-10-CM

## 2025-04-29 DIAGNOSIS — Z98.890 OTHER SPECIFIED POSTPROCEDURAL STATES: Chronic | ICD-10-CM

## 2025-04-29 DIAGNOSIS — F20.9 SCHIZOPHRENIA, UNSPECIFIED: ICD-10-CM

## 2025-04-29 DIAGNOSIS — E83.110 HEREDITARY HEMOCHROMATOSIS: ICD-10-CM

## 2025-04-29 DIAGNOSIS — Z00.00 ENCOUNTER FOR GENERAL ADULT MEDICAL EXAMINATION WITHOUT ABNORMAL FINDINGS: ICD-10-CM

## 2025-04-29 DIAGNOSIS — M79.606 PAIN IN LEG, UNSPECIFIED: ICD-10-CM

## 2025-04-29 PROCEDURE — 99214 OFFICE O/P EST MOD 30 MIN: CPT

## 2025-04-29 PROCEDURE — G2211 COMPLEX E/M VISIT ADD ON: CPT | Mod: NC

## 2025-04-29 RX ORDER — NAPROXEN 250 MG/1
250 TABLET ORAL TWICE DAILY
Qty: 60 | Refills: 2 | Status: ACTIVE | COMMUNITY
Start: 2025-04-29 | End: 1900-01-01

## 2025-04-29 RX ORDER — DICLOFENAC SODIUM 10 MG/G
1 GEL TOPICAL
Qty: 1 | Refills: 2 | Status: ACTIVE | COMMUNITY
Start: 2025-04-29 | End: 1900-01-01

## 2025-05-01 DIAGNOSIS — G40.909 EPILEPSY, UNSPECIFIED, NOT INTRACTABLE, WITHOUT STATUS EPILEPTICUS: ICD-10-CM

## 2025-05-01 DIAGNOSIS — H40.1132 PRIMARY OPEN-ANGLE GLAUCOMA, BILATERAL, MODERATE STAGE: ICD-10-CM

## 2025-05-01 DIAGNOSIS — H25.041 POSTERIOR SUBCAPSULAR POLAR AGE-RELATED CATARACT, RIGHT EYE: ICD-10-CM

## 2025-05-01 DIAGNOSIS — E83.110 HEREDITARY HEMOCHROMATOSIS: ICD-10-CM

## 2025-05-01 DIAGNOSIS — H31.023 SOLAR RETINOPATHY, BILATERAL: ICD-10-CM

## 2025-05-01 DIAGNOSIS — F20.9 SCHIZOPHRENIA, UNSPECIFIED: ICD-10-CM

## 2025-05-01 DIAGNOSIS — M79.606 PAIN IN LEG, UNSPECIFIED: ICD-10-CM

## 2025-05-08 ENCOUNTER — APPOINTMENT (OUTPATIENT)
Dept: ULTRASOUND IMAGING | Facility: HOSPITAL | Age: 60
End: 2025-05-08
Payer: MEDICARE

## 2025-05-08 ENCOUNTER — OUTPATIENT (OUTPATIENT)
Dept: OUTPATIENT SERVICES | Facility: HOSPITAL | Age: 60
LOS: 1 days | End: 2025-05-08
Payer: MEDICARE

## 2025-05-08 DIAGNOSIS — M79.606 PAIN IN LEG, UNSPECIFIED: ICD-10-CM

## 2025-05-08 DIAGNOSIS — Z00.8 ENCOUNTER FOR OTHER GENERAL EXAMINATION: ICD-10-CM

## 2025-05-08 PROCEDURE — 93925 LOWER EXTREMITY STUDY: CPT | Mod: 26

## 2025-05-08 PROCEDURE — 93925 LOWER EXTREMITY STUDY: CPT

## 2025-05-09 DIAGNOSIS — M79.606 PAIN IN LEG, UNSPECIFIED: ICD-10-CM

## 2025-05-13 ENCOUNTER — LABORATORY RESULT (OUTPATIENT)
Age: 60
End: 2025-05-13

## 2025-05-13 ENCOUNTER — OUTPATIENT (OUTPATIENT)
Dept: OUTPATIENT SERVICES | Facility: HOSPITAL | Age: 60
LOS: 1 days | End: 2025-05-13
Payer: MEDICARE

## 2025-05-13 DIAGNOSIS — Z98.890 OTHER SPECIFIED POSTPROCEDURAL STATES: Chronic | ICD-10-CM

## 2025-05-13 DIAGNOSIS — M79.606 PAIN IN LEG, UNSPECIFIED: ICD-10-CM

## 2025-05-13 PROCEDURE — 81001 URINALYSIS AUTO W/SCOPE: CPT

## 2025-05-13 PROCEDURE — 82550 ASSAY OF CK (CPK): CPT

## 2025-05-13 PROCEDURE — 85652 RBC SED RATE AUTOMATED: CPT

## 2025-05-13 PROCEDURE — 82330 ASSAY OF CALCIUM: CPT

## 2025-05-13 PROCEDURE — 83735 ASSAY OF MAGNESIUM: CPT

## 2025-05-14 DIAGNOSIS — M79.606 PAIN IN LEG, UNSPECIFIED: ICD-10-CM

## 2025-05-15 ENCOUNTER — APPOINTMENT (OUTPATIENT)
Dept: INTERNAL MEDICINE | Facility: CLINIC | Age: 60
End: 2025-05-15

## 2025-05-16 ENCOUNTER — OUTPATIENT (OUTPATIENT)
Dept: OUTPATIENT SERVICES | Facility: HOSPITAL | Age: 60
LOS: 1 days | End: 2025-05-16
Payer: COMMERCIAL

## 2025-05-16 ENCOUNTER — APPOINTMENT (OUTPATIENT)
Age: 60
End: 2025-05-16

## 2025-05-16 DIAGNOSIS — E83.110 HEREDITARY HEMOCHROMATOSIS: ICD-10-CM

## 2025-05-16 DIAGNOSIS — Z98.890 OTHER SPECIFIED POSTPROCEDURAL STATES: Chronic | ICD-10-CM

## 2025-05-16 LAB
AUTO BASOPHILS #: 0.06 K/UL
AUTO BASOPHILS %: 1 %
AUTO EOSINOPHILS #: 0.1 K/UL
AUTO EOSINOPHILS %: 1.6 %
AUTO IMMATURE GRANULOCYTES #: 0.03 K/UL
AUTO LYMPHOCYTES #: 3.02 K/UL
AUTO LYMPHOCYTES %: 48.4 %
AUTO MONOCYTES #: 0.47 K/UL
AUTO MONOCYTES %: 7.5 %
AUTO NEUTROPHILS #: 2.56 K/UL
AUTO NEUTROPHILS %: 41 %
AUTO NRBC #: 0 K/UL
HCT VFR BLD CALC: 42.5 %
HGB BLD-MCNC: 14.5 G/DL
IMM GRANULOCYTES NFR BLD AUTO: 0.5 %
MAN DIFF?: NORMAL
MCHC RBC-ENTMCNC: 33.2 PG
MCHC RBC-ENTMCNC: 34.1 G/DL
MCV RBC AUTO: 97.3 FL
PLATELET # BLD AUTO: 188 K/UL
PMV BLD AUTO: 0 /100 WBCS
PMV BLD: 10.6 FL
RBC # BLD: 4.37 M/UL
RBC # FLD: 12.1 %
WBC # FLD AUTO: 6.24 K/UL

## 2025-05-16 PROCEDURE — 85025 COMPLETE CBC W/AUTO DIFF WBC: CPT

## 2025-05-17 DIAGNOSIS — E83.110 HEREDITARY HEMOCHROMATOSIS: ICD-10-CM

## 2025-05-22 ENCOUNTER — OUTPATIENT (OUTPATIENT)
Dept: OUTPATIENT SERVICES | Facility: HOSPITAL | Age: 60
LOS: 1 days | End: 2025-05-22
Payer: COMMERCIAL

## 2025-05-22 DIAGNOSIS — R26.9 UNSPECIFIED ABNORMALITIES OF GAIT AND MOBILITY: ICD-10-CM

## 2025-05-22 DIAGNOSIS — Z00.8 ENCOUNTER FOR OTHER GENERAL EXAMINATION: ICD-10-CM

## 2025-05-22 DIAGNOSIS — Z98.890 OTHER SPECIFIED POSTPROCEDURAL STATES: Chronic | ICD-10-CM

## 2025-05-22 PROCEDURE — 70551 MRI BRAIN STEM W/O DYE: CPT | Mod: 26

## 2025-05-22 PROCEDURE — 70551 MRI BRAIN STEM W/O DYE: CPT

## 2025-05-23 ENCOUNTER — OUTPATIENT (OUTPATIENT)
Dept: OUTPATIENT SERVICES | Facility: HOSPITAL | Age: 60
LOS: 1 days | End: 2025-05-23
Payer: COMMERCIAL

## 2025-05-23 DIAGNOSIS — R26.9 UNSPECIFIED ABNORMALITIES OF GAIT AND MOBILITY: ICD-10-CM

## 2025-05-23 DIAGNOSIS — Z00.8 ENCOUNTER FOR OTHER GENERAL EXAMINATION: ICD-10-CM

## 2025-05-23 DIAGNOSIS — M54.59 OTHER LOW BACK PAIN: ICD-10-CM

## 2025-05-23 PROCEDURE — 72148 MRI LUMBAR SPINE W/O DYE: CPT

## 2025-05-23 PROCEDURE — 72148 MRI LUMBAR SPINE W/O DYE: CPT | Mod: 26

## 2025-05-24 DIAGNOSIS — M54.59 OTHER LOW BACK PAIN: ICD-10-CM

## 2025-05-29 ENCOUNTER — OUTPATIENT (OUTPATIENT)
Dept: OUTPATIENT SERVICES | Facility: HOSPITAL | Age: 60
LOS: 1 days | End: 2025-05-29
Payer: COMMERCIAL

## 2025-05-29 ENCOUNTER — APPOINTMENT (OUTPATIENT)
Dept: INTERNAL MEDICINE | Facility: CLINIC | Age: 60
End: 2025-05-29
Payer: COMMERCIAL

## 2025-05-29 VITALS
WEIGHT: 170.06 LBS | HEIGHT: 69 IN | TEMPERATURE: 97.8 F | DIASTOLIC BLOOD PRESSURE: 87 MMHG | HEART RATE: 85 BPM | OXYGEN SATURATION: 98 % | BODY MASS INDEX: 25.19 KG/M2 | SYSTOLIC BLOOD PRESSURE: 132 MMHG

## 2025-05-29 DIAGNOSIS — F20.9 SCHIZOPHRENIA, UNSPECIFIED: ICD-10-CM

## 2025-05-29 DIAGNOSIS — Z00.00 ENCOUNTER FOR GENERAL ADULT MEDICAL EXAMINATION W/OUT ABNORMAL FINDINGS: ICD-10-CM

## 2025-05-29 DIAGNOSIS — M62.81 MUSCLE WEAKNESS (GENERALIZED): ICD-10-CM

## 2025-05-29 DIAGNOSIS — Z00.00 ENCOUNTER FOR GENERAL ADULT MEDICAL EXAMINATION WITHOUT ABNORMAL FINDINGS: ICD-10-CM

## 2025-05-29 DIAGNOSIS — Z98.890 OTHER SPECIFIED POSTPROCEDURAL STATES: Chronic | ICD-10-CM

## 2025-05-29 DIAGNOSIS — E83.110 HEREDITARY HEMOCHROMATOSIS: ICD-10-CM

## 2025-05-29 DIAGNOSIS — I10 ESSENTIAL (PRIMARY) HYPERTENSION: ICD-10-CM

## 2025-05-29 DIAGNOSIS — M10.9 GOUT, UNSPECIFIED: ICD-10-CM

## 2025-05-29 DIAGNOSIS — G40.909 EPILEPSY, UNSPECIFIED, NOT INTRACTABLE, W/OUT STATUS EPILEPTICUS: ICD-10-CM

## 2025-05-29 DIAGNOSIS — E78.5 HYPERLIPIDEMIA, UNSPECIFIED: ICD-10-CM

## 2025-05-29 DIAGNOSIS — M79.606 PAIN IN LEG, UNSPECIFIED: ICD-10-CM

## 2025-05-29 DIAGNOSIS — F41.9 ANXIETY DISORDER, UNSPECIFIED: ICD-10-CM

## 2025-05-29 PROCEDURE — 99213 OFFICE O/P EST LOW 20 MIN: CPT

## 2025-05-29 PROCEDURE — 99214 OFFICE O/P EST MOD 30 MIN: CPT

## 2025-05-29 PROCEDURE — G2211 COMPLEX E/M VISIT ADD ON: CPT | Mod: NC

## 2025-06-04 ENCOUNTER — OUTPATIENT (OUTPATIENT)
Dept: OUTPATIENT SERVICES | Facility: HOSPITAL | Age: 60
LOS: 1 days | End: 2025-06-04
Payer: COMMERCIAL

## 2025-06-04 ENCOUNTER — APPOINTMENT (OUTPATIENT)
Dept: PAIN MANAGEMENT | Facility: CLINIC | Age: 60
End: 2025-06-04
Payer: COMMERCIAL

## 2025-06-04 VITALS
DIASTOLIC BLOOD PRESSURE: 84 MMHG | TEMPERATURE: 98.1 F | OXYGEN SATURATION: 98 % | WEIGHT: 256 LBS | HEART RATE: 70 BPM | BODY MASS INDEX: 37.92 KG/M2 | SYSTOLIC BLOOD PRESSURE: 124 MMHG | HEIGHT: 69 IN

## 2025-06-04 DIAGNOSIS — Z98.890 OTHER SPECIFIED POSTPROCEDURAL STATES: Chronic | ICD-10-CM

## 2025-06-04 DIAGNOSIS — M25.551 PAIN IN RIGHT HIP: ICD-10-CM

## 2025-06-04 DIAGNOSIS — Z00.00 ENCOUNTER FOR GENERAL ADULT MEDICAL EXAMINATION WITHOUT ABNORMAL FINDINGS: ICD-10-CM

## 2025-06-04 DIAGNOSIS — M25.552 PAIN IN RIGHT HIP: ICD-10-CM

## 2025-06-04 PROCEDURE — 99204 OFFICE O/P NEW MOD 45 MIN: CPT

## 2025-06-04 RX ORDER — MELOXICAM 15 MG/1
15 TABLET ORAL DAILY
Qty: 30 | Refills: 2 | Status: ACTIVE | COMMUNITY
Start: 2025-06-04 | End: 1900-01-01

## 2025-06-05 DIAGNOSIS — E78.5 HYPERLIPIDEMIA, UNSPECIFIED: ICD-10-CM

## 2025-06-05 DIAGNOSIS — G40.909 EPILEPSY, UNSPECIFIED, NOT INTRACTABLE, WITHOUT STATUS EPILEPTICUS: ICD-10-CM

## 2025-06-05 DIAGNOSIS — F20.9 SCHIZOPHRENIA, UNSPECIFIED: ICD-10-CM

## 2025-06-05 DIAGNOSIS — F41.9 ANXIETY DISORDER, UNSPECIFIED: ICD-10-CM

## 2025-06-05 DIAGNOSIS — M79.606 PAIN IN LEG, UNSPECIFIED: ICD-10-CM

## 2025-06-05 DIAGNOSIS — M62.81 MUSCLE WEAKNESS (GENERALIZED): ICD-10-CM

## 2025-06-05 DIAGNOSIS — M10.9 GOUT, UNSPECIFIED: ICD-10-CM

## 2025-06-05 DIAGNOSIS — I10 ESSENTIAL (PRIMARY) HYPERTENSION: ICD-10-CM

## 2025-06-05 DIAGNOSIS — E83.110 HEREDITARY HEMOCHROMATOSIS: ICD-10-CM

## 2025-06-05 PROBLEM — M25.551: Status: ACTIVE | Noted: 2025-06-05

## 2025-06-10 ENCOUNTER — OUTPATIENT (OUTPATIENT)
Dept: OUTPATIENT SERVICES | Facility: HOSPITAL | Age: 60
LOS: 1 days | End: 2025-06-10
Payer: MEDICARE

## 2025-06-10 DIAGNOSIS — Z98.890 OTHER SPECIFIED POSTPROCEDURAL STATES: Chronic | ICD-10-CM

## 2025-06-10 PROCEDURE — 83550 IRON BINDING TEST: CPT

## 2025-06-10 PROCEDURE — 84466 ASSAY OF TRANSFERRIN: CPT

## 2025-06-10 PROCEDURE — 85025 COMPLETE CBC W/AUTO DIFF WBC: CPT

## 2025-06-10 PROCEDURE — 83540 ASSAY OF IRON: CPT

## 2025-06-10 PROCEDURE — 83036 HEMOGLOBIN GLYCOSYLATED A1C: CPT

## 2025-06-10 PROCEDURE — 82728 ASSAY OF FERRITIN: CPT

## 2025-06-10 PROCEDURE — 84443 ASSAY THYROID STIM HORMONE: CPT

## 2025-06-10 PROCEDURE — 80053 COMPREHEN METABOLIC PANEL: CPT

## 2025-06-10 PROCEDURE — 80061 LIPID PANEL: CPT

## 2025-06-11 ENCOUNTER — EMERGENCY (EMERGENCY)
Facility: HOSPITAL | Age: 60
LOS: 0 days | Discharge: HOME | End: 2025-06-11
Attending: EMERGENCY MEDICINE
Payer: MEDICARE

## 2025-06-11 ENCOUNTER — APPOINTMENT (OUTPATIENT)
Age: 60
End: 2025-06-11

## 2025-06-11 VITALS
WEIGHT: 174.39 LBS | RESPIRATION RATE: 18 BRPM | OXYGEN SATURATION: 96 % | HEART RATE: 89 BPM | DIASTOLIC BLOOD PRESSURE: 70 MMHG | SYSTOLIC BLOOD PRESSURE: 106 MMHG | TEMPERATURE: 98 F

## 2025-06-11 DIAGNOSIS — F20.9 SCHIZOPHRENIA, UNSPECIFIED: ICD-10-CM

## 2025-06-11 DIAGNOSIS — Z98.890 OTHER SPECIFIED POSTPROCEDURAL STATES: Chronic | ICD-10-CM

## 2025-06-11 DIAGNOSIS — M79.651 PAIN IN RIGHT THIGH: ICD-10-CM

## 2025-06-11 DIAGNOSIS — I10 ESSENTIAL (PRIMARY) HYPERTENSION: ICD-10-CM

## 2025-06-11 DIAGNOSIS — M19.90 UNSPECIFIED OSTEOARTHRITIS, UNSPECIFIED SITE: ICD-10-CM

## 2025-06-11 PROCEDURE — 99283 EMERGENCY DEPT VISIT LOW MDM: CPT

## 2025-06-11 PROCEDURE — 99283 EMERGENCY DEPT VISIT LOW MDM: CPT | Mod: FS

## 2025-06-11 RX ORDER — IBUPROFEN 200 MG
600 TABLET ORAL ONCE
Refills: 0 | Status: COMPLETED | OUTPATIENT
Start: 2025-06-11 | End: 2025-06-11

## 2025-06-11 RX ADMIN — Medication 600 MILLIGRAM(S): at 12:14

## 2025-06-11 NOTE — ED PROVIDER NOTE - PATIENT PORTAL LINK FT
You can access the FollowMyHealth Patient Portal offered by [facility] by registering at the following website: [email or web address]. By joining [employer]’s FollowMyHealth portal, you will also be able to view your health information using other applications (apps) compatible with our system.

## 2025-06-11 NOTE — ED PROVIDER NOTE - NSFOLLOWUPINSTRUCTIONS_ED_ALL_ED_FT
Leg Pain    WHAT YOU NEED TO KNOW:    Leg pain may be caused by a variety of health conditions. Your tests did not show any broken bones or blood clots.    DISCHARGE INSTRUCTIONS:    Return to the emergency department if:     You have a fever.      Your leg starts to swell.      Your leg pain gets worse.      You have numbness or tingling in your leg or toes.      You cannot put any weight on or move your leg.    Contact your healthcare provider if:     Your pain does not decrease, even after treatment.      You have questions or concerns about your condition or care.    Medicines:     NSAIDs, such as ibuprofen, help decrease swelling, pain, and fever. This medicine is available with or without a doctor's order. NSAIDs can cause stomach bleeding or kidney problems in certain people. If you take blood thinner medicine, always ask your healthcare provider if NSAIDs are safe for you. Always read the medicine label and follow directions.      Take your medicine as directed. Contact your healthcare provider if you think your medicine is not helping or if you have side effects. Tell him of her if you are allergic to any medicine. Keep a list of the medicines, vitamins, and herbs you take. Include the amounts, and when and why you take them. Bring the list or the pill bottles to follow-up visits. Carry your medicine list with you in case of an emergency.    Follow up with your healthcare provider as directed: You may need more tests to find the cause of your leg pain. You may need to see an orthopedic specialist or a physical therapist. Write down your questions so you remember to ask them during your visits.    Manage your leg pain:     Rest your injured leg so that it can heal. You may need an immobilizer, brace, or splint to limit the movement of your leg. You may need to avoid putting any weight on your leg for at least 48 hours. Return to normal activities as directed.      Ice the injury for 20 minutes every 4 hours for up to 24 hours, or as directed. Use an ice pack, or put crushed ice in a plastic bag. Cover it with a towel to protect your skin. Ice helps prevent tissue damage and decreases swelling and pain.      Elevate your injured leg above the level of your heart as often as you can. This will help decrease swelling and pain. If possible, prop your leg on pillows or blankets to keep the area elevated comfortably.       Use assistive devices as directed. You may need to use a cane or crutches. Assistive devices help decrease pain and pressure on your leg when you walk. Ask your healthcare provider for more information about assistive devices and how to use them correctly.      Maintain a healthy weight. Extra body weight can cause pressure and pain in your hip, knee, and ankle joints. Ask your healthcare provider how much you should weigh. Ask him to help you create a weight loss plan if you are overweight.         © Copyright [employer] 2019 All illustrations and images included in CareNotes are the copyrighted property of [employer] Inc[employer] or [employer].

## 2025-06-11 NOTE — ED PROVIDER NOTE - ATTENDING APP SHARED VISIT CONTRIBUTION OF CARE
59-year-old male past history of hypertension, schizophrenia, seizures, sent from assisted living for complaint of thigh pain bilateral.  Patient says been going on for few days.  Patient says worse on walking.  Patient denies any back pain.  Patient denies any falls or trauma.  Patient has not taken any pain medication.  Patient says he is worried that is related to seizures.  Exam: Bilateral lower extremity normal, no swelling, nontender, normal ambulation in ED impression: Patient with bilateral leg pain, history of arthritis on EMR review, patient advised NSAIDs.  Patient to follow-up with PT/Ortho

## 2025-06-11 NOTE — ED PROVIDER NOTE - PHYSICAL EXAMINATION
Vital Signs: I have reviewed the initial vital signs.  Constitutional: NAD, well-nourished, appears stated age, no acute distress.  HEENT: Airway patent, moist MM, no erythema/swelling/deformity of oral structures. EOMI, PERRLA.  CV: regular rate, regular rhythm, well-perfused extremities, 2+ b/l DP and radial pulses equal.  Lungs: BCTA, no increased WOB.  ABD: NTND, no guarding or rebound, no pulsatile mass, no hernias.   MSK: Neck supple, nontender, nl ROM, no stepoff. Chest nontender. Back nontender. Ext nontender, nl rom, no deformity.   INTEG: Skin warm, dry, no rash.  NEURO: A&Ox3, normal strength, nl sensation throughout, normal speech. ambulating without difficulty.  PSYCH: Calm, cooperative, normal affect and interaction.

## 2025-06-11 NOTE — ED PROVIDER NOTE - OBJECTIVE STATEMENT
59-year-old male with history of seizures, hypertension, schizophrenia presents to the ED complaining of leg pain when walking this morning.  Patient denies any history of trauma, back pain, numbness, loss of urine or stool or saddle anesthesia.

## 2025-06-11 NOTE — ED PROVIDER NOTE - CLINICAL SUMMARY MEDICAL DECISION MAKING FREE TEXT BOX
Patient with bilateral leg pain, history of arthritis on EMR review, patient advised NSAIDs.  Patient to follow-up with PT/Ortho

## 2025-06-12 DIAGNOSIS — Z00.00 ENCOUNTER FOR GENERAL ADULT MEDICAL EXAMINATION WITHOUT ABNORMAL FINDINGS: ICD-10-CM

## 2025-06-13 DIAGNOSIS — M25.551 PAIN IN RIGHT HIP: ICD-10-CM

## 2025-06-13 DIAGNOSIS — Z00.00 ENCOUNTER FOR GENERAL ADULT MEDICAL EXAMINATION WITHOUT ABNORMAL FINDINGS: ICD-10-CM

## 2025-07-03 ENCOUNTER — APPOINTMENT (OUTPATIENT)
Dept: ORTHOPEDIC SURGERY | Facility: CLINIC | Age: 60
End: 2025-07-03
Payer: COMMERCIAL

## 2025-07-03 PROCEDURE — 73522 X-RAY EXAM HIPS BI 3-4 VIEWS: CPT

## 2025-07-03 PROCEDURE — 99203 OFFICE O/P NEW LOW 30 MIN: CPT

## 2025-07-08 ENCOUNTER — APPOINTMENT (OUTPATIENT)
Age: 60
End: 2025-07-08

## 2025-07-08 ENCOUNTER — APPOINTMENT (OUTPATIENT)
Dept: PAIN MANAGEMENT | Facility: CLINIC | Age: 60
End: 2025-07-08
Payer: COMMERCIAL

## 2025-07-08 PROCEDURE — 99204 OFFICE O/P NEW MOD 45 MIN: CPT

## 2025-07-11 ENCOUNTER — OUTPATIENT (OUTPATIENT)
Dept: OUTPATIENT SERVICES | Facility: HOSPITAL | Age: 60
LOS: 1 days | End: 2025-07-11
Payer: COMMERCIAL

## 2025-07-11 ENCOUNTER — APPOINTMENT (OUTPATIENT)
Dept: OPHTHALMOLOGY | Facility: CLINIC | Age: 60
End: 2025-07-11
Payer: COMMERCIAL

## 2025-07-11 DIAGNOSIS — H53.8 OTHER VISUAL DISTURBANCES: ICD-10-CM

## 2025-07-11 DIAGNOSIS — H40.1132 PRIMARY OPEN-ANGLE GLAUCOMA, BILATERAL, MODERATE STAGE: ICD-10-CM

## 2025-07-11 DIAGNOSIS — H31.023 SOLAR RETINOPATHY, BILATERAL: ICD-10-CM

## 2025-07-11 DIAGNOSIS — Z98.890 OTHER SPECIFIED POSTPROCEDURAL STATES: Chronic | ICD-10-CM

## 2025-07-11 DIAGNOSIS — H25.041 POSTERIOR SUBCAPSULAR POLAR AGE-RELATED CATARACT, RIGHT EYE: ICD-10-CM

## 2025-07-11 PROCEDURE — 99214 OFFICE O/P EST MOD 30 MIN: CPT

## 2025-07-11 PROCEDURE — 92134 CPTRZ OPH DX IMG PST SGM RTA: CPT | Mod: 26

## 2025-07-16 ENCOUNTER — APPOINTMENT (OUTPATIENT)
Dept: PAIN MANAGEMENT | Facility: CLINIC | Age: 60
End: 2025-07-16
Payer: COMMERCIAL

## 2025-07-16 PROCEDURE — 77002 NEEDLE LOCALIZATION BY XRAY: CPT | Mod: 79

## 2025-07-16 PROCEDURE — 20610 DRAIN/INJ JOINT/BURSA W/O US: CPT | Mod: 50

## 2025-07-22 ENCOUNTER — APPOINTMENT (OUTPATIENT)
Dept: NEUROLOGY | Facility: CLINIC | Age: 60
End: 2025-07-22

## 2025-08-05 ENCOUNTER — APPOINTMENT (OUTPATIENT)
Dept: PAIN MANAGEMENT | Facility: CLINIC | Age: 60
End: 2025-08-05
Payer: COMMERCIAL

## 2025-08-05 DIAGNOSIS — M25.552 PAIN IN RIGHT HIP: ICD-10-CM

## 2025-08-05 DIAGNOSIS — M16.0 BILATERAL PRIMARY OSTEOARTHRITIS OF HIP: ICD-10-CM

## 2025-08-05 DIAGNOSIS — M25.551 PAIN IN RIGHT HIP: ICD-10-CM

## 2025-08-05 PROCEDURE — 99214 OFFICE O/P EST MOD 30 MIN: CPT

## 2025-08-09 ENCOUNTER — RX RENEWAL (OUTPATIENT)
Age: 60
End: 2025-08-09

## 2025-08-09 RX ORDER — DICLOFENAC SODIUM 75 MG/1
75 TABLET, DELAYED RELEASE ORAL
Qty: 60 | Refills: 0 | Status: ACTIVE | COMMUNITY
Start: 2025-08-05 | End: 1900-01-01

## 2025-09-04 ENCOUNTER — APPOINTMENT (OUTPATIENT)
Dept: OPHTHALMOLOGY | Facility: CLINIC | Age: 60
End: 2025-09-04

## 2025-09-09 ENCOUNTER — RX RENEWAL (OUTPATIENT)
Age: 60
End: 2025-09-09

## 2025-09-09 ENCOUNTER — APPOINTMENT (OUTPATIENT)
Dept: PAIN MANAGEMENT | Facility: CLINIC | Age: 60
End: 2025-09-09
Payer: COMMERCIAL

## 2025-09-09 DIAGNOSIS — M16.0 BILATERAL PRIMARY OSTEOARTHRITIS OF HIP: ICD-10-CM

## 2025-09-09 DIAGNOSIS — M46.1 SACROILIITIS, NOT ELSEWHERE CLASSIFIED: ICD-10-CM

## 2025-09-09 PROCEDURE — 99214 OFFICE O/P EST MOD 30 MIN: CPT
